# Patient Record
Sex: MALE | Race: WHITE | NOT HISPANIC OR LATINO | Employment: OTHER | URBAN - METROPOLITAN AREA
[De-identification: names, ages, dates, MRNs, and addresses within clinical notes are randomized per-mention and may not be internally consistent; named-entity substitution may affect disease eponyms.]

---

## 2019-09-17 ENCOUNTER — OFFICE VISIT (OUTPATIENT)
Dept: NEUROLOGY | Facility: CLINIC | Age: 69
End: 2019-09-17

## 2019-09-17 VITALS
BODY MASS INDEX: 31.74 KG/M2 | HEIGHT: 67 IN | HEART RATE: 58 BPM | WEIGHT: 202.2 LBS | SYSTOLIC BLOOD PRESSURE: 128 MMHG | DIASTOLIC BLOOD PRESSURE: 75 MMHG

## 2019-09-17 DIAGNOSIS — G47.33 OBSTRUCTIVE SLEEP APNEA: Primary | ICD-10-CM

## 2019-09-17 PROCEDURE — 99203 OFFICE O/P NEW LOW 30 MIN: CPT | Performed by: PSYCHIATRY & NEUROLOGY

## 2019-09-17 RX ORDER — ROSUVASTATIN CALCIUM 10 MG/1
10 TABLET, COATED ORAL DAILY
Refills: 3 | COMMUNITY
Start: 2019-08-02 | End: 2019-11-01 | Stop reason: SDUPTHER

## 2019-09-17 NOTE — PROGRESS NOTES
Sleep Medicine initial Consultation    Giovani Moseley  : 1950  69 y.o. male   Date of Service: 2019  Referring provider: Yvonne Santa MD  Chief Complaint:  Sleep apnea     First diagnosed in  with ahi of 18  Repeat npsg in 2016 with ahi 2.4 and rdi 20    New sleep, patient had sleep study done on 2016  Patient is new to this area moved here from [place]. Patient. He followed with a  neurologists for CPAP compliance. Patient currently on a CPAP machine and uses a FFM and would like to continue [facility] in [place].     SCR:  avg use 7hr 56 min compliance 96%, avg pressure 6.0 ahi 3.1 and leak 38 sec    Mr. Giovani Moseley  is a 69 y.o. right handed  male patient has a H/O Arthritis is here for the evaluation of Snoring and Restless Leg Syndrome.     The patient c/o There is no H/O sleep paralysis, hypnagogic hallucinations or cataplexy..  There is no history of hypnagogic hallucinations, sleep paralysis or cataplexy.    The patient complains of snoring has dry mouth or sore mouth when he wakes up.    The patient complains of leg jerking during sleep.     The patient complains of difficulty staying asleep and frequent awakenings 1-2.    The patient reports history of There is no h/O sleepwalking or bedwetting or nightmares or sleep eating or acting out dreams    Sleep schedule: Bedtime:10:30 , gets out of bed at 5-7, sleep latency: couple of minutes, Gets about 6-7 hours of sleep.    EPWORTH SLEEPINESS SCALE  Sitting and reading  0  WatchingTV  0  Sitting, inactive, in a public place  0  As a passenger in a car for 1 hour w/o a break  0  Lying down to rest in the afternoon  2  Sitting and talking to someone  0  Sitting quietly after a lunch  0  In a car, while stopped for traffic or a light  0  Total 2        .  Past Medical History:   Diagnosis Date   • Benign prostate hyperplasia    • Hyperlipidemia      History reviewed. No pertinent surgical history.  Current Outpatient  Medications on File Prior to Visit   Medication Sig Dispense Refill   • rosuvastatin (CRESTOR) 10 MG tablet Take 10 mg by mouth Daily.  3     No current facility-administered medications on file prior to visit.      No Known Allergies  Family History   Problem Relation Age of Onset   • Pancreatic cancer Mother    • Alzheimer's disease Father      Social History     Socioeconomic History   • Marital status:      Spouse name: Not on file   • Number of children: Not on file   • Years of education: Not on file   • Highest education level: Not on file   Tobacco Use   • Smoking status: Former Smoker   • Smokeless tobacco: Never Used   Substance and Sexual Activity   • Alcohol use: Yes     Comment: socially   • Drug use: No   • Sexual activity: Defer     Review of Systems   Constitutional: Negative for appetite change and fatigue.   HENT: Negative for sinus pressure and sinus pain.    Eyes: Positive for visual disturbance. Negative for pain and itching.   Respiratory: Negative for cough and shortness of breath.    Cardiovascular: Negative for chest pain and palpitations.   Gastrointestinal: Negative for constipation and diarrhea.   Endocrine: Negative for cold intolerance and heat intolerance.   Genitourinary: Positive for frequency. Negative for difficulty urinating.   Musculoskeletal: Negative for back pain and gait problem.   Allergic/Immunologic: Negative for environmental allergies.   Neurological: Negative for dizziness, tremors, seizures, syncope, facial asymmetry, speech difficulty, weakness, light-headedness, numbness and headaches.   Psychiatric/Behavioral: Negative for agitation and confusion.     I reviewed and addressed ROS entered by MA.    Patient examination:  Vitals:    09/17/19 1741   BP: 128/75   Pulse: 58    Body mass index is 31.67 kg/m².     Physical Exam   Constitutional: He is oriented to person, place, and time. He appears well-developed and well-nourished.   HENT:   Nose: Nose normal.    Mouth/Throat: Oropharynx is clear and moist.   Eyes: Conjunctivae and EOM are normal. Pupils are equal, round, and reactive to light.   Cardiovascular: Normal rate, regular rhythm and normal heart sounds.   Pulmonary/Chest: Effort normal and breath sounds normal.   Musculoskeletal: Normal range of motion. He exhibits no edema or deformity.   Neurological: He is alert and oriented to person, place, and time. No cranial nerve deficit.   Psychiatric: He has a normal mood and affect. His behavior is normal. Thought content normal.   Vitals reviewed.      ASSESSMENT AND PLAN:    History of richard,   Pt compliant with CPAP and benefits. Continue current pressure and mask  Will review cpap download when available----------------------------compliance from 8/19 to 9/17 96%-----------------------------    Encounter Diagnosis   Name Primary?   • Obstructive sleep apnea Yes           Return in about 1 year (around 9/17/2020).    This document has been electronically signed by Joseph Seipel, MD  on September 17, 2019 6:09 PM

## 2019-09-20 ENCOUNTER — TELEPHONE (OUTPATIENT)
Dept: NEUROLOGY | Facility: CLINIC | Age: 69
End: 2019-09-20

## 2019-09-20 DIAGNOSIS — G47.33 OBSTRUCTIVE SLEEP APNEA: Primary | ICD-10-CM

## 2019-11-01 ENCOUNTER — OFFICE VISIT (OUTPATIENT)
Dept: FAMILY MEDICINE CLINIC | Facility: CLINIC | Age: 69
End: 2019-11-01

## 2019-11-01 VITALS
HEART RATE: 47 BPM | HEIGHT: 68 IN | OXYGEN SATURATION: 96 % | WEIGHT: 207 LBS | SYSTOLIC BLOOD PRESSURE: 142 MMHG | DIASTOLIC BLOOD PRESSURE: 83 MMHG | TEMPERATURE: 98.1 F | BODY MASS INDEX: 31.37 KG/M2

## 2019-11-01 DIAGNOSIS — E78.5 HYPERLIPIDEMIA, UNSPECIFIED HYPERLIPIDEMIA TYPE: Primary | ICD-10-CM

## 2019-11-01 DIAGNOSIS — R73.03 PREDIABETES: ICD-10-CM

## 2019-11-01 LAB
ALBUMIN SERPL-MCNC: 5.2 G/DL (ref 3.5–5.2)
ALBUMIN/GLOB SERPL: 2.3 G/DL
ALP SERPL-CCNC: 48 U/L (ref 39–117)
ALT SERPL W P-5'-P-CCNC: 30 U/L (ref 1–41)
ANION GAP SERPL CALCULATED.3IONS-SCNC: 11.1 MMOL/L (ref 5–15)
AST SERPL-CCNC: 22 U/L (ref 1–40)
BILIRUB SERPL-MCNC: 1.9 MG/DL (ref 0.2–1.2)
BUN BLD-MCNC: 15 MG/DL (ref 8–23)
BUN/CREAT SERPL: 15.6 (ref 7–25)
CALCIUM SPEC-SCNC: 9.7 MG/DL (ref 8.6–10.5)
CHLORIDE SERPL-SCNC: 102 MMOL/L (ref 98–107)
CHOLEST SERPL-MCNC: 134 MG/DL (ref 0–200)
CO2 SERPL-SCNC: 28.9 MMOL/L (ref 22–29)
CREAT BLD-MCNC: 0.96 MG/DL (ref 0.76–1.27)
GFR SERPL CREATININE-BSD FRML MDRD: 78 ML/MIN/1.73
GLOBULIN UR ELPH-MCNC: 2.3 GM/DL
GLUCOSE BLD-MCNC: 100 MG/DL (ref 65–99)
HBA1C MFR BLD: 5.1 % (ref 3.5–5.6)
HDLC SERPL-MCNC: 64 MG/DL (ref 40–60)
LDLC SERPL CALC-MCNC: 53 MG/DL (ref 0–100)
LDLC/HDLC SERPL: 0.83 {RATIO}
POTASSIUM BLD-SCNC: 4.5 MMOL/L (ref 3.5–5.2)
PROT SERPL-MCNC: 7.5 G/DL (ref 6–8.5)
SODIUM BLD-SCNC: 142 MMOL/L (ref 136–145)
TRIGL SERPL-MCNC: 85 MG/DL (ref 0–150)
VLDLC SERPL-MCNC: 17 MG/DL

## 2019-11-01 PROCEDURE — 36415 COLL VENOUS BLD VENIPUNCTURE: CPT | Performed by: FAMILY MEDICINE

## 2019-11-01 PROCEDURE — 83036 HEMOGLOBIN GLYCOSYLATED A1C: CPT | Performed by: FAMILY MEDICINE

## 2019-11-01 PROCEDURE — 80053 COMPREHEN METABOLIC PANEL: CPT | Performed by: FAMILY MEDICINE

## 2019-11-01 PROCEDURE — 80061 LIPID PANEL: CPT | Performed by: FAMILY MEDICINE

## 2019-11-01 PROCEDURE — 99203 OFFICE O/P NEW LOW 30 MIN: CPT | Performed by: FAMILY MEDICINE

## 2019-11-01 RX ORDER — ZOSTER VACCINE RECOMBINANT, ADJUVANTED 50 MCG/0.5
KIT INTRAMUSCULAR
Refills: 0 | COMMUNITY
Start: 2019-10-18 | End: 2019-11-08

## 2019-11-01 RX ORDER — PREDNISOLN SP/MOXIFLOX/BROMFEN 1 %-0.5 %
DROPS OPHTHALMIC (EYE)
Refills: 1 | COMMUNITY
Start: 2019-10-15 | End: 2019-11-08

## 2019-11-01 RX ORDER — ROSUVASTATIN CALCIUM 10 MG/1
10 TABLET, COATED ORAL DAILY
Qty: 90 TABLET | Refills: 3 | Status: SHIPPED | OUTPATIENT
Start: 2019-11-01 | End: 2021-03-31

## 2019-11-08 ENCOUNTER — OFFICE VISIT (OUTPATIENT)
Dept: CARDIOLOGY | Facility: CLINIC | Age: 69
End: 2019-11-08

## 2019-11-08 VITALS
BODY MASS INDEX: 30.62 KG/M2 | HEIGHT: 68 IN | HEART RATE: 57 BPM | OXYGEN SATURATION: 97 % | DIASTOLIC BLOOD PRESSURE: 80 MMHG | WEIGHT: 202 LBS | SYSTOLIC BLOOD PRESSURE: 126 MMHG | RESPIRATION RATE: 18 BRPM

## 2019-11-08 DIAGNOSIS — E78.2 MIXED HYPERLIPIDEMIA: ICD-10-CM

## 2019-11-08 DIAGNOSIS — R01.1 HEART MURMUR: Primary | ICD-10-CM

## 2019-11-08 PROCEDURE — 99203 OFFICE O/P NEW LOW 30 MIN: CPT | Performed by: INTERNAL MEDICINE

## 2019-11-11 NOTE — PROGRESS NOTES
Subjective:     Encounter Date:11/08/2019      Patient ID: Giovani Moseley is a 69 y.o. male.    Chief Complaint:  Chief Complaint   Patient presents with   • Coronary Artery Disease     Unconfirmed   • Hyperlipidemia       HPI:  Giovani is a very pleasant 69-year-old male patient who is new to me who presents for establishing care.  He is a transplant from [facility].  He has coronary artery risk factor significant only for previous tobacco use and dyslipidemia.  He is on no other medications save rosuvastatin 10 mg p.o. daily.  Reportedly he underwent stress testing 3 years ago which was normal.  He is never undergone cardiac catheterization.  I personally reviewed his electric cardiogram from 1/18/2019 which showed sinus bradycardia and a mildly late transition in the precordial leads.  I personally reviewed his most recent labs showed an LDL of 129 and HDL of 65.    He has no complaints from a cardiovascular standpoint.    The following portions of the patient's history were reviewed and updated as appropriate: allergies, current medications, past family history, past medical history, past social history, past surgical history and problem list.    Problem List:  Patient Active Problem List   Diagnosis   • Hyperlipidemia   • Prediabetes       Past Medical History:  Past Medical History:   Diagnosis Date   • Abnormal ECG 01/18/2019    I believe past submitted records reflect such   • Benign prostate hyperplasia    • Congenital heart 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    S/A   • Coronary artery 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    S/A   • Hyperlipidemia    • Sleep apnea 01/31/08    conflicting opinions from different specialists in 2008 and       Past Surgical History:  Past Surgical History:   Procedure Laterality Date   • EYE SURGERY Bilateral 10/3 and 10/15   • TONSILLECTOMY         Social History:  Social History     Socioeconomic History   • Marital status:      Spouse name: Not on file   • Number of children: Not on file   • Years of  "education: Not on file   • Highest education level: Not on file   Occupational History   • 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   Tobacco Use   • Smoking status: Former Smoker     Packs/day: 0.50     Years: 30.00     Pack years: 15.00     Types: Cigarettes     Start date: 1966     Last attempt to quit: 1995     Years since quittin.8   • Smokeless tobacco: Never Used   • Tobacco comment: Stopped in mid 90s   Substance and Sexual Activity   • Alcohol use: Yes     Types: 2 Cans of beer per week     Frequency: Monthly or less     Comment: light drinker when socializing   • Drug use: No   • Sexual activity: No       Allergies:  No Known Allergies      Review of Symptoms:  Constitutional: Patient afebrile no chills or unexpected weight changes  Respiratory: No cough, no wheezing or dyspnea  Cardiovascular: No chest pain, palpitations, dyspnea, orthopnea and no edema  Gastrointestinal: No nausea, vomiting, constipation or diarrhea.  No melena or dark stools    All other systems reviewed and are negative         Objective:         /80 (BP Location: Left arm, Patient Position: Sitting, Cuff Size: Large Adult)   Pulse 57   Resp 18   Ht 172.7 cm (68\")   Wt 91.6 kg (202 lb)   SpO2 97%   BMI 30.71 kg/m²     Physical exam  Constitutional: well-nourished, and appears stated age in no acute distress  PERRL: Conjunctiva clear, no pallor, anicteric  HENMT: normocephalic, normal dentition, no cyanosis or pallor  Neck:no bruits, or thrills and bilateral normal carotid upstroke. Normal jugular venous pressure  Cardiovascular: No parasternal heaves an non-displaced focal PMI. Normal rate and rhythm: no rub, gallop, 1-2 out of 6 early peaking systolic ejection murmur and normal S1 and S2; no lower or upper extremity edema.   Lungs: unlabored, no wheezing with no rales or rhonchi on auscultation.  Extremities: Warm, no clubbing, cyanosis. Full and equal peripheral pulses in extremities with no bruits " appreciated.   Abdomen: soft, non-tender, non-distended  Musculoskeletal: no joint tenderness or swelling and no erythema  Skin: Warm and dry, non-erythematous   Neuro:alert and normal affect. Oriented to time, place and person.       In-Office Procedure(s):  Procedures    ASCVD RIsk Score::  The 10-year ASCVD risk score (Julia LANCE Jr., et al., 2013) is: 10.8%    Values used to calculate the score:      Age: 69 years      Sex: Male      Is Non- : No      Diabetic: No      Tobacco smoker: No      Systolic Blood Pressure: 126 mmHg      Is BP treated: No      HDL Cholesterol: 64 mg/dL      Total Cholesterol: 134 mg/dL    Recent Radiology:  Imaging Results (Most Recent)     None          Lab Review:   Office Visit on 11/01/2019   Component Date Value   • Glucose 11/01/2019 100*   • BUN 11/01/2019 15    • Creatinine 11/01/2019 0.96    • Sodium 11/01/2019 142    • Potassium 11/01/2019 4.5    • Chloride 11/01/2019 102    • CO2 11/01/2019 28.9    • Calcium 11/01/2019 9.7    • Total Protein 11/01/2019 7.5    • Albumin 11/01/2019 5.20    • ALT (SGPT) 11/01/2019 30    • AST (SGOT) 11/01/2019 22    • Alkaline Phosphatase 11/01/2019 48    • Total Bilirubin 11/01/2019 1.9*   • eGFR Non African Amer 11/01/2019 78    • Globulin 11/01/2019 2.3    • A/G Ratio 11/01/2019 2.3    • BUN/Creatinine Ratio 11/01/2019 15.6    • Anion Gap 11/01/2019 11.1    • Total Cholesterol 11/01/2019 134    • Triglycerides 11/01/2019 85    • HDL Cholesterol 11/01/2019 64*   • LDL Cholesterol  11/01/2019 53    • VLDL Cholesterol 11/01/2019 17    • LDL/HDL Ratio 11/01/2019 0.83    • Hemoglobin A1C 11/01/2019 5.1             Invalid input(s): ALKPO4                        Invalid input(s): LDLCALC                Assessment:          Diagnosis Plan   1. Heart murmur  Adult Transthoracic Echo Complete W/ Cont if Necessary Per Protocol   2. Mixed hyperlipidemia            Plan:         1. Heart murmur  Likely aortic sclerosis.  - Adult  Transthoracic Echo Complete W/ Cont if Necessary Per Protocol; Future    2. Mixed hyperlipidemia  Well-controlled on medical therapy.    Greater than 30 minutes of face-to-face time was spent with the patient and family, of which greater than 50% was spent counseling specifically discussing dietary restriction, exercise regimen and overall primary prevention for the development of the coronary artery disease.        Level of Care:                 Hollis Redd MD  11/11/19  .

## 2019-11-13 ENCOUNTER — HOSPITAL ENCOUNTER (OUTPATIENT)
Dept: CARDIOLOGY | Facility: HOSPITAL | Age: 69
Discharge: HOME | End: 2019-11-13
Admitting: INTERNAL MEDICINE

## 2019-11-13 VITALS
DIASTOLIC BLOOD PRESSURE: 67 MMHG | WEIGHT: 194 LBS | BODY MASS INDEX: 30.45 KG/M2 | HEART RATE: 54 BPM | SYSTOLIC BLOOD PRESSURE: 126 MMHG | HEIGHT: 67 IN

## 2019-11-13 DIAGNOSIS — R01.1 HEART MURMUR: ICD-10-CM

## 2019-11-13 PROCEDURE — 93306 TTE W/DOPPLER COMPLETE: CPT | Performed by: INTERNAL MEDICINE

## 2019-11-13 PROCEDURE — 93306 TTE W/DOPPLER COMPLETE: CPT

## 2019-11-15 LAB
BH CV ECHO MEAS - ACS: 2.2 CM
BH CV ECHO MEAS - AO MAX PG (FULL): 1.6 MMHG
BH CV ECHO MEAS - AO MAX PG: 4.2 MMHG
BH CV ECHO MEAS - AO MEAN PG (FULL): 1.3 MMHG
BH CV ECHO MEAS - AO MEAN PG: 2.5 MMHG
BH CV ECHO MEAS - AO ROOT AREA (BSA CORRECTED): 1.4
BH CV ECHO MEAS - AO ROOT AREA: 6.5 CM^2
BH CV ECHO MEAS - AO ROOT DIAM: 2.9 CM
BH CV ECHO MEAS - AO V2 MAX: 102.6 CM/SEC
BH CV ECHO MEAS - AO V2 MEAN: 76.6 CM/SEC
BH CV ECHO MEAS - AO V2 VTI: 24.2 CM
BH CV ECHO MEAS - AORTIC HR: 49.6 BPM
BH CV ECHO MEAS - AORTIC R-R: 1.2 SEC
BH CV ECHO MEAS - ASC AORTA: 3.1 CM
BH CV ECHO MEAS - AVA(I,A): 3.3 CM^2
BH CV ECHO MEAS - AVA(I,D): 3.3 CM^2
BH CV ECHO MEAS - AVA(V,A): 3.5 CM^2
BH CV ECHO MEAS - AVA(V,D): 3.5 CM^2
BH CV ECHO MEAS - BSA(HAYCOCK): 2.1 M^2
BH CV ECHO MEAS - BSA: 2 M^2
BH CV ECHO MEAS - BZI_BMI: 30.4 KILOGRAMS/M^2
BH CV ECHO MEAS - BZI_METRIC_HEIGHT: 170.2 CM
BH CV ECHO MEAS - BZI_METRIC_WEIGHT: 88 KG
BH CV ECHO MEAS - CI(AO): 3.9 L/MIN/M^2
BH CV ECHO MEAS - CI(LVOT): 2 L/MIN/M^2
BH CV ECHO MEAS - CO(AO): 7.8 L/MIN
BH CV ECHO MEAS - CO(LVOT): 3.9 L/MIN
BH CV ECHO MEAS - EDV(CUBED): 99.7 ML
BH CV ECHO MEAS - EDV(MOD-SP2): 98.9 ML
BH CV ECHO MEAS - EDV(MOD-SP4): 92.3 ML
BH CV ECHO MEAS - EDV(TEICH): 99.2 ML
BH CV ECHO MEAS - EF(CUBED): 60.3 %
BH CV ECHO MEAS - EF(MOD-BP): 69 %
BH CV ECHO MEAS - EF(MOD-SP2): 72.8 %
BH CV ECHO MEAS - EF(MOD-SP4): 66.2 %
BH CV ECHO MEAS - EF(TEICH): 51.9 %
BH CV ECHO MEAS - ESV(CUBED): 39.6 ML
BH CV ECHO MEAS - ESV(MOD-SP2): 26.9 ML
BH CV ECHO MEAS - ESV(MOD-SP4): 31.2 ML
BH CV ECHO MEAS - ESV(TEICH): 47.7 ML
BH CV ECHO MEAS - FS: 26.5 %
BH CV ECHO MEAS - IVS/LVPW: 1.1
BH CV ECHO MEAS - IVSD: 1.3 CM
BH CV ECHO MEAS - LA DIMENSION(2D): 3.7 CM
BH CV ECHO MEAS - LV DIASTOLIC VOL/BSA (35-75): 46.2 ML/M^2
BH CV ECHO MEAS - LV MASS(C)D: 215.1 GRAMS
BH CV ECHO MEAS - LV MASS(C)DI: 107.8 GRAMS/M^2
BH CV ECHO MEAS - LV MAX PG: 2.6 MMHG
BH CV ECHO MEAS - LV MEAN PG: 1.2 MMHG
BH CV ECHO MEAS - LV SYSTOLIC VOL/BSA (12-30): 15.6 ML/M^2
BH CV ECHO MEAS - LV V1 MAX: 80.9 CM/SEC
BH CV ECHO MEAS - LV V1 MEAN: 50.7 CM/SEC
BH CV ECHO MEAS - LV V1 VTI: 18 CM
BH CV ECHO MEAS - LVIDD: 4.6 CM
BH CV ECHO MEAS - LVIDS: 3.4 CM
BH CV ECHO MEAS - LVOT AREA: 4.4 CM^2
BH CV ECHO MEAS - LVOT DIAM: 2.4 CM
BH CV ECHO MEAS - LVPWD: 1.2 CM
BH CV ECHO MEAS - MV A MAX VEL: 62 CM/SEC
BH CV ECHO MEAS - MV DEC SLOPE: 207.6 CM/SEC^2
BH CV ECHO MEAS - MV DEC TIME: 0.31 SEC
BH CV ECHO MEAS - MV E MAX VEL: 65.1 CM/SEC
BH CV ECHO MEAS - MV E/A: 1.1
BH CV ECHO MEAS - PA ACC TIME: 0.16 SEC
BH CV ECHO MEAS - PA MAX PG (FULL): 9.4 MMHG
BH CV ECHO MEAS - PA MAX PG: 10.6 MMHG
BH CV ECHO MEAS - PA MEAN PG (FULL): 4.8 MMHG
BH CV ECHO MEAS - PA MEAN PG: 5.4 MMHG
BH CV ECHO MEAS - PA PR(ACCEL): 7.1 MMHG
BH CV ECHO MEAS - PA V2 MAX: 162.7 CM/SEC
BH CV ECHO MEAS - PA V2 MEAN: 108.4 CM/SEC
BH CV ECHO MEAS - PA V2 VTI: 36.7 CM
BH CV ECHO MEAS - PVA(I,A): 1.3 CM^2
BH CV ECHO MEAS - PVA(I,D): 1.3 CM^2
BH CV ECHO MEAS - PVA(V,A): 1.3 CM^2
BH CV ECHO MEAS - PVA(V,D): 1.3 CM^2
BH CV ECHO MEAS - QP/QS: 0.61
BH CV ECHO MEAS - RAP SYSTOLE: 3 MMHG
BH CV ECHO MEAS - RV MAX PG: 1.2 MMHG
BH CV ECHO MEAS - RV MEAN PG: 0.62 MMHG
BH CV ECHO MEAS - RV V1 MAX: 53.8 CM/SEC
BH CV ECHO MEAS - RV V1 MEAN: 36.7 CM/SEC
BH CV ECHO MEAS - RV V1 VTI: 12 CM
BH CV ECHO MEAS - RVDD: 3.8 CM
BH CV ECHO MEAS - RVOT AREA: 4 CM^2
BH CV ECHO MEAS - RVOT DIAM: 2.3 CM
BH CV ECHO MEAS - RVSP: 24.4 MMHG
BH CV ECHO MEAS - SI(AO): 78.4 ML/M^2
BH CV ECHO MEAS - SI(CUBED): 30.1 ML/M^2
BH CV ECHO MEAS - SI(LVOT): 39.8 ML/M^2
BH CV ECHO MEAS - SI(MOD-SP2): 36.1 ML/M^2
BH CV ECHO MEAS - SI(MOD-SP4): 30.6 ML/M^2
BH CV ECHO MEAS - SI(TEICH): 25.8 ML/M^2
BH CV ECHO MEAS - SV(AO): 156.5 ML
BH CV ECHO MEAS - SV(CUBED): 60.1 ML
BH CV ECHO MEAS - SV(LVOT): 79.4 ML
BH CV ECHO MEAS - SV(MOD-SP2): 72 ML
BH CV ECHO MEAS - SV(MOD-SP4): 61.1 ML
BH CV ECHO MEAS - SV(RVOT): 48.6 ML
BH CV ECHO MEAS - SV(TEICH): 51.4 ML
BH CV ECHO MEAS - TR MAX VEL: 231.2 CM/SEC
MAXIMAL PREDICTED HEART RATE: 152 BPM
STRESS TARGET HR: 129 BPM

## 2019-11-20 ENCOUNTER — TELEPHONE (OUTPATIENT)
Dept: CARDIOLOGY | Facility: CLINIC | Age: 69
End: 2019-11-20

## 2019-11-20 NOTE — TELEPHONE ENCOUNTER
LMOM for pt to return my call for echo results. Per Ivania, no sign of heart failure and only mild leakiness from a couple of your heart valves which is normal for your age. Maria De Jesus

## 2020-08-03 ENCOUNTER — LAB (OUTPATIENT)
Dept: FAMILY MEDICINE CLINIC | Facility: CLINIC | Age: 70
End: 2020-08-03

## 2020-08-03 ENCOUNTER — OFFICE VISIT (OUTPATIENT)
Dept: FAMILY MEDICINE CLINIC | Facility: CLINIC | Age: 70
End: 2020-08-03

## 2020-08-03 VITALS
DIASTOLIC BLOOD PRESSURE: 73 MMHG | HEART RATE: 49 BPM | WEIGHT: 196 LBS | TEMPERATURE: 97.7 F | BODY MASS INDEX: 30.7 KG/M2 | OXYGEN SATURATION: 95 % | SYSTOLIC BLOOD PRESSURE: 144 MMHG

## 2020-08-03 DIAGNOSIS — E78.2 MIXED HYPERLIPIDEMIA: ICD-10-CM

## 2020-08-03 DIAGNOSIS — R73.03 PREDIABETES: ICD-10-CM

## 2020-08-03 DIAGNOSIS — Z00.00 WELLNESS EXAMINATION: Primary | ICD-10-CM

## 2020-08-03 LAB
ALBUMIN SERPL-MCNC: 4.5 G/DL (ref 3.5–5.2)
ALBUMIN/GLOB SERPL: 2 G/DL
ALP SERPL-CCNC: 41 U/L (ref 39–117)
ALT SERPL W P-5'-P-CCNC: 36 U/L (ref 1–41)
ANION GAP SERPL CALCULATED.3IONS-SCNC: 9.8 MMOL/L (ref 5–15)
AST SERPL-CCNC: 20 U/L (ref 1–40)
BILIRUB SERPL-MCNC: 2 MG/DL (ref 0–1.2)
BUN SERPL-MCNC: 13 MG/DL (ref 8–23)
BUN/CREAT SERPL: 14.4 (ref 7–25)
CALCIUM SPEC-SCNC: 9.5 MG/DL (ref 8.6–10.5)
CHLORIDE SERPL-SCNC: 102 MMOL/L (ref 98–107)
CHOLEST SERPL-MCNC: 129 MG/DL (ref 0–200)
CO2 SERPL-SCNC: 26.2 MMOL/L (ref 22–29)
CREAT SERPL-MCNC: 0.9 MG/DL (ref 0.76–1.27)
GFR SERPL CREATININE-BSD FRML MDRD: 84 ML/MIN/1.73
GLOBULIN UR ELPH-MCNC: 2.2 GM/DL
GLUCOSE SERPL-MCNC: 98 MG/DL (ref 65–99)
HBA1C MFR BLD: 5.3 % (ref 3.5–5.6)
HDLC SERPL-MCNC: 57 MG/DL (ref 40–60)
LDLC SERPL CALC-MCNC: 57 MG/DL (ref 0–100)
LDLC/HDLC SERPL: 1.01 {RATIO}
POTASSIUM SERPL-SCNC: 4.1 MMOL/L (ref 3.5–5.2)
PROT SERPL-MCNC: 6.7 G/DL (ref 6–8.5)
SODIUM SERPL-SCNC: 138 MMOL/L (ref 136–145)
TRIGL SERPL-MCNC: 73 MG/DL (ref 0–150)
VLDLC SERPL-MCNC: 14.6 MG/DL (ref 5–40)

## 2020-08-03 PROCEDURE — G0439 PPPS, SUBSEQ VISIT: HCPCS | Performed by: FAMILY MEDICINE

## 2020-08-03 PROCEDURE — 36415 COLL VENOUS BLD VENIPUNCTURE: CPT | Performed by: FAMILY MEDICINE

## 2020-08-03 PROCEDURE — 80053 COMPREHEN METABOLIC PANEL: CPT | Performed by: FAMILY MEDICINE

## 2020-08-03 PROCEDURE — 80061 LIPID PANEL: CPT | Performed by: FAMILY MEDICINE

## 2020-08-03 PROCEDURE — 83036 HEMOGLOBIN GLYCOSYLATED A1C: CPT | Performed by: FAMILY MEDICINE

## 2020-08-03 NOTE — PROGRESS NOTES
Subjective   Chief Complaint   Patient presents with   • Hyperlipidemia     f/u   • Labs Only     Giovani Moseley is a 70 y.o. male.     Hyperlipidemia   This is a chronic problem. The current episode started more than 1 year ago. The problem is controlled. Recent lipid tests were reviewed and are normal. He has no history of chronic renal disease.      Past Medical History:   Diagnosis Date   • Abnormal ECG 2019    I believe past submitted records reflect such   • Benign prostate hyperplasia    • Congenital heart disease 2015    S/A   • Coronary artery disease 2015    S/A   • Hyperlipidemia    • Sleep apnea 08    conflicting opinions from different specialists in 2008 and     Past Surgical History:   Procedure Laterality Date   • EYE SURGERY Bilateral 10/3 and 10/15   • TONSILLECTOMY       No Known Allergies  Social History     Socioeconomic History   • Marital status:      Spouse name: Not on file   • Number of children: Not on file   • Years of education: Not on file   • Highest education level: Not on file   Occupational History   • 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   Tobacco Use   • Smoking status: Former Smoker     Packs/day: 0.50     Years: 30.00     Pack years: 15.00     Types: Cigarettes     Start date: 1966     Last attempt to quit: 1995     Years since quittin.6   • Smokeless tobacco: Never Used   • Tobacco comment: Stopped in mid s   Substance and Sexual Activity   • Alcohol use: Yes     Types: 2 Cans of beer per week     Frequency: Monthly or less     Comment: light drinker when socializing   • Drug use: No   • Sexual activity: Never     Social History     Tobacco Use   Smoking Status Former Smoker   • Packs/day: 0.50   • Years: 30.00   • Pack years: 15.00   • Types: Cigarettes   • Start date: 1966   • Last attempt to quit: 1995   • Years since quittin.6   Smokeless Tobacco Never Used   Tobacco Comment    Stopped in mid 90s       family  history includes Alzheimer's disease in his father; Pancreatic cancer in his mother.  Current Outpatient Medications on File Prior to Visit   Medication Sig Dispense Refill   • rosuvastatin (CRESTOR) 10 MG tablet Take 1 tablet by mouth Daily. 90 tablet 3     No current facility-administered medications on file prior to visit.      Patient Active Problem List   Diagnosis   • Hyperlipidemia   • Prediabetes       The following portions of the patient's history were reviewed and updated as appropriate: allergies, current medications, past family history, past medical history, past social history, past surgical history and problem list.    Review of Systems    Objective   /73 (BP Location: Left arm, Patient Position: Sitting, Cuff Size: Adult)   Pulse (!) 49   Temp 97.7 °F (36.5 °C)   Wt 88.9 kg (196 lb)   SpO2 95%   BMI 30.70 kg/m²   Physical Exam    No visits with results within 1 Week(s) from this visit.   Latest known visit with results is:   Hospital Outpatient Visit on 11/13/2019   Component Date Value Ref Range Status   • BSA 11/13/2019 2.0  m^2 Final   • RVIDd 11/13/2019 3.8  cm Final   • IVSd 11/13/2019 1.3  cm Final   • LVIDd 11/13/2019 4.6  cm Final   • LVIDs 11/13/2019 3.4  cm Final   • LVPWd 11/13/2019 1.2  cm Final   • IVS/LVPW 11/13/2019 1.1   Final   • FS 11/13/2019 26.5  % Final   • EDV(Teich) 11/13/2019 99.2  ml Final   • ESV(Teich) 11/13/2019 47.7  ml Final   • EF(Teich) 11/13/2019 51.9  % Final   • EDV(cubed) 11/13/2019 99.7  ml Final   • ESV(cubed) 11/13/2019 39.6  ml Final   • EF(cubed) 11/13/2019 60.3  % Final   • LV mass(C)d 11/13/2019 215.1  grams Final   • LV mass(C)dI 11/13/2019 107.8  grams/m^2 Final   • SV(Teich) 11/13/2019 51.4  ml Final   • SI(Teich) 11/13/2019 25.8  ml/m^2 Final   • SV(cubed) 11/13/2019 60.1  ml Final   • SI(cubed) 11/13/2019 30.1  ml/m^2 Final   • Ao root diam 11/13/2019 2.9  cm Final   • Ao root area 11/13/2019 6.5  cm^2 Final   • ACS 11/13/2019 2.2  cm Final    • asc Aorta Diam 11/13/2019 3.1  cm Final   • LVOT diam 11/13/2019 2.4  cm Final   • LVOT area 11/13/2019 4.4  cm^2 Final   • RVOT diam 11/13/2019 2.3  cm Final   • RVOT area 11/13/2019 4.0  cm^2 Final   • EDV(MOD-sp4) 11/13/2019 92.3  ml Final   • ESV(MOD-sp4) 11/13/2019 31.2  ml Final   • EF(MOD-sp4) 11/13/2019 66.2  % Final   • EDV(MOD-sp2) 11/13/2019 98.9  ml Final   • ESV(MOD-sp2) 11/13/2019 26.9  ml Final   • EF(MOD-sp2) 11/13/2019 72.8  % Final   • SV(MOD-sp4) 11/13/2019 61.1  ml Final   • SI(MOD-sp4) 11/13/2019 30.6  ml/m^2 Final   • SV(MOD-sp2) 11/13/2019 72.0  ml Final   • SI(MOD-sp2) 11/13/2019 36.1  ml/m^2 Final   • Ao root area (BSA corrected) 11/13/2019 1.4   Final   • LV Montejo Vol (BSA corrected) 11/13/2019 46.2  ml/m^2 Final   • LV Sys Vol (BSA corrected) 11/13/2019 15.6  ml/m^2 Final   • Aortic R-R 11/13/2019 1.2  sec Final   • Aortic HR 11/13/2019 49.6  BPM Final   • MV E max lisa 11/13/2019 65.1  cm/sec Final   • MV A max lisa 11/13/2019 62.0  cm/sec Final   • MV E/A 11/13/2019 1.1   Final   • MV dec slope 11/13/2019 207.6  cm/sec^2 Final   • MV dec time 11/13/2019 0.31  sec Final   • Ao pk lisa 11/13/2019 102.6  cm/sec Final   • Ao max PG 11/13/2019 4.2  mmHg Final   • Ao max PG (full) 11/13/2019 1.6  mmHg Final   • Ao V2 mean 11/13/2019 76.6  cm/sec Final   • Ao mean PG 11/13/2019 2.5  mmHg Final   • Ao mean PG (full) 11/13/2019 1.3  mmHg Final   • Ao V2 VTI 11/13/2019 24.2  cm Final   • FRANCISCO(I,A) 11/13/2019 3.3  cm^2 Final   • FRANCISCO(I,D) 11/13/2019 3.3  cm^2 Final   • FRANCISCO(V,A) 11/13/2019 3.5  cm^2 Final   • FRANCISCO(V,D) 11/13/2019 3.5  cm^2 Final   • LV V1 max PG 11/13/2019 2.6  mmHg Final   • LV V1 mean PG 11/13/2019 1.2  mmHg Final   • LV V1 max 11/13/2019 80.9  cm/sec Final   • LV V1 mean 11/13/2019 50.7  cm/sec Final   • LV V1 VTI 11/13/2019 18.0  cm Final   • CO(Ao) 11/13/2019 7.8  l/min Final   • CI(Ao) 11/13/2019 3.9  l/min/m^2 Final   • SV(Ao) 11/13/2019 156.5  ml Final   • SI(Ao) 11/13/2019  78.4  ml/m^2 Final   • CO(LVOT) 11/13/2019 3.9  l/min Final   • CI(LVOT) 11/13/2019 2.0  l/min/m^2 Final   • SV(LVOT) 11/13/2019 79.4  ml Final   • SV(RVOT) 11/13/2019 48.6  ml Final   • SI(LVOT) 11/13/2019 39.8  ml/m^2 Final   • PA V2 max 11/13/2019 162.7  cm/sec Final   • PA max PG 11/13/2019 10.6  mmHg Final   • PA max PG (full) 11/13/2019 9.4  mmHg Final   • PA V2 mean 11/13/2019 108.4  cm/sec Final   • PA mean PG 11/13/2019 5.4  mmHg Final   • PA mean PG (full) 11/13/2019 4.8  mmHg Final   • PA V2 VTI 11/13/2019 36.7  cm Final   • PVA(I,A) 11/13/2019 1.3  cm^2 Final   • BH CV ECHO AYANNA - PVA(I,D) 11/13/2019 1.3  cm^2 Final   • BH CV ECHO AYANNA - PVA(V,A) 11/13/2019 1.3  cm^2 Final   • BH CV ECHO AYANNA - PVA(V,D) 11/13/2019 1.3  cm^2 Final   • PA acc time 11/13/2019 0.16  sec Final   • RV V1 max PG 11/13/2019 1.2  mmHg Final   • RV V1 mean PG 11/13/2019 0.62  mmHg Final   • RV V1 max 11/13/2019 53.8  cm/sec Final   • RV V1 mean 11/13/2019 36.7  cm/sec Final   • RV V1 VTI 11/13/2019 12.0  cm Final   • TR max lisa 11/13/2019 231.2  cm/sec Final   • RVSP(TR) 11/13/2019 24.4  mmHg Final   • RAP systole 11/13/2019 3.0  mmHg Final   • PA pr(Accel) 11/13/2019 7.1  mmHg Final   • Qp/Qs 11/13/2019 0.61   Final   • BH CV ECHO AYANNA - BZI_BMI 11/13/2019 30.4  kilograms/m^2 Final   • BH CV ECHO AYANNA - BSA(HAYCOCK) 11/13/2019 2.1  m^2 Final   • BH CV ECHO AYANNA - BZI_METRIC_WEIGHT 11/13/2019 88.0  kg Final   • BH CV ECHO AYANNA - BZI_METRIC_HEIGHT 11/13/2019 170.2  cm Final   • EF(MOD-bp) 11/13/2019 69.0  % Final   • LA dimension(2D) 11/13/2019 3.7  cm Final   • Target HR (85%) 11/13/2019 129  bpm Final   • Max. Pred. HR (100%) 11/13/2019 152  bpm Final           Assessment/Plan              Answers for HPI/ROS submitted by the patient on 8/2/2020   What is the primary reason for your visit?: Other  Please describe your symptoms.: Bi-annual 6 month  visit with blood testing which has been delayed by Covaid 19., Renewal of  Rosuvastin 10mg prescription by Dr. Santa if it is nearing expiration., Otherwise, no issues with me.  Have you had these symptoms before?: Yes  How long have you been having these symptoms?: 1-4 days  Please list any medications you are currently taking for this condition.: N/A  Please describe any probable cause for these symptoms. : N/A

## 2020-08-03 NOTE — PROGRESS NOTES
Medicare Wellness Visit   The ABC's of the Annual Wellness Visit    Chief Complaint   Patient presents with   • Hyperlipidemia     f/u   • Labs Only       HPI:  GIULIA Pal-1950, is a 70 y.o. male who presents for a Medicare Wellness Visit.    Recent Hospitalizations:  No hospitalization(s) within the last year..    Current Medical Providers:  Patient Care Team:  Yvonne Santa MD as PCP - General (Family Medicine)  Yvonne Santa MD as PCP - Claims Attributed  Patrick Crane MD as Consulting Physician (Urology)  Seipel, Joseph F, MD as Consulting Physician (Sleep Medicine)  Hollis Redd MD as Consulting Physician (Cardiology)  Alexey Stuart MD as Consulting Physician (Ophthalmology)    Health Habits and Functional and Cognitive Screening and Depression Screening:  Functional & Cognitive Status 8/3/2020   Do you have difficulty preparing food and eating? No   Do you have difficulty bathing yourself, getting dressed or grooming yourself? No   Do you have difficulty using the toilet? No   Do you have difficulty moving around from place to place? No   Do you have trouble with steps or getting out of a bed or a chair? No   Current Diet Well Balanced Diet   Current Exercise Activities Include Aerobics   Do you need help using the phone?  No   Are you deaf or do you have serious difficulty hearing?  No   Do you need help with transportation? No   Do you need help shopping? No   Do you need help preparing meals?  No   Do you need help with housework?  No   Do you need help with laundry? No   Do you need help taking your medications? No   Do you ever drive or ride in a car without wearing a seat belt? No   Have you felt unusual stress, anger or loneliness in the last month? No   Who do you live with? Spouse   If you need help, do you have trouble finding someone available to you? No   Do you have difficulty concentrating, remembering or making decisions? No       Compared to one  year ago, the patient feels his physical health is the same and his mental health is the same.    Depression Screen:  PHQ-2/PHQ-9 Depression Screening 8/3/2020   Little interest or pleasure in doing things 0   Feeling down, depressed, or hopeless 0   Total Score 0         Past Medical/Family/Social History:  The following portions of the patient's history were reviewed and updated as appropriate: allergies, current medications, past family history, past medical history, past social history, past surgical history and problem list.    No Known Allergies      Current Outpatient Medications:   •  rosuvastatin (CRESTOR) 10 MG tablet, Take 1 tablet by mouth Daily., Disp: 90 tablet, Rfl: 3    Aspirin use counseling: Does not need ASA (and currently is not on it)    Current medication list contains no high risk medications.  No harmful drug interactions have been identified.     Family History   Problem Relation Age of Onset   • Pancreatic cancer Mother    • Alzheimer's disease Father        Social History     Tobacco Use   • Smoking status: Former Smoker     Packs/day: 0.50     Years: 30.00     Pack years: 15.00     Types: Cigarettes     Start date: 1966     Last attempt to quit: 1995     Years since quittin.6   • Smokeless tobacco: Never Used   • Tobacco comment: Stopped in mid    Substance Use Topics   • Alcohol use: Yes     Types: 2 Cans of beer per week     Frequency: Monthly or less     Comment: light drinker when socializing       Past Surgical History:   Procedure Laterality Date   • EYE SURGERY Bilateral 10/3 and 10/15   • TONSILLECTOMY         Patient Active Problem List   Diagnosis   • Hyperlipidemia   • Prediabetes       Review of Systems   Constitutional: Negative for chills and fever.   HENT: Negative for sinus pressure and sore throat.    Eyes: Negative for blurred vision.   Respiratory: Negative for cough and shortness of breath.    Cardiovascular: Negative for chest pain and palpitations.    Gastrointestinal: Negative for abdominal pain.   Endocrine: Negative for polyuria.   Skin: Negative for rash.   Neurological: Negative for dizziness and headache.   Hematological: Negative for adenopathy.   Psychiatric/Behavioral: Negative for depressed mood.       Objective     Vitals:    08/03/20 0758   BP: 144/73   BP Location: Left arm   Patient Position: Sitting   Cuff Size: Adult   Pulse: (!) 49   Temp: 97.7 °F (36.5 °C)   SpO2: 95%   Weight: 88.9 kg (196 lb)       Patient's Body mass index is 30.7 kg/m². BMI is above normal parameters. Recommendations include: exercise counseling.      No exam data present    The patient has no evidence of cognitve impairment.     Physical Exam   Constitutional: He is oriented to person, place, and time. He appears well-developed. No distress.   HENT:   Head: Normocephalic.   Eyes: Conjunctivae and lids are normal.   Neck: Trachea normal. No thyroid mass and no thyromegaly present.   Cardiovascular: Normal rate, regular rhythm and normal heart sounds.   Pulmonary/Chest: Effort normal and breath sounds normal.   Lymphadenopathy:     He has no cervical adenopathy.   Neurological: He is alert and oriented to person, place, and time.   Skin: Skin is warm and dry.   Psychiatric: He has a normal mood and affect. His speech is normal and behavior is normal. He is attentive.       Recent Lab Results:     Lab Results   Component Value Date    CHOL 134 11/01/2019    TRIG 85 11/01/2019    HDL 64 (H) 11/01/2019    VLDL 17 11/01/2019    LDLHDL 0.83 11/01/2019     No visits with results within 1 Week(s) from this visit.   Latest known visit with results is:   Hospital Outpatient Visit on 11/13/2019   Component Date Value Ref Range Status   • BSA 11/13/2019 2.0  m^2 Final   • RVIDd 11/13/2019 3.8  cm Final   • IVSd 11/13/2019 1.3  cm Final   • LVIDd 11/13/2019 4.6  cm Final   • LVIDs 11/13/2019 3.4  cm Final   • LVPWd 11/13/2019 1.2  cm Final   • IVS/LVPW 11/13/2019 1.1   Final   • FS  11/13/2019 26.5  % Final   • EDV(Teich) 11/13/2019 99.2  ml Final   • ESV(Teich) 11/13/2019 47.7  ml Final   • EF(Teich) 11/13/2019 51.9  % Final   • EDV(cubed) 11/13/2019 99.7  ml Final   • ESV(cubed) 11/13/2019 39.6  ml Final   • EF(cubed) 11/13/2019 60.3  % Final   • LV mass(C)d 11/13/2019 215.1  grams Final   • LV mass(C)dI 11/13/2019 107.8  grams/m^2 Final   • SV(Teich) 11/13/2019 51.4  ml Final   • SI(Teich) 11/13/2019 25.8  ml/m^2 Final   • SV(cubed) 11/13/2019 60.1  ml Final   • SI(cubed) 11/13/2019 30.1  ml/m^2 Final   • Ao root diam 11/13/2019 2.9  cm Final   • Ao root area 11/13/2019 6.5  cm^2 Final   • ACS 11/13/2019 2.2  cm Final   • asc Aorta Diam 11/13/2019 3.1  cm Final   • LVOT diam 11/13/2019 2.4  cm Final   • LVOT area 11/13/2019 4.4  cm^2 Final   • RVOT diam 11/13/2019 2.3  cm Final   • RVOT area 11/13/2019 4.0  cm^2 Final   • EDV(MOD-sp4) 11/13/2019 92.3  ml Final   • ESV(MOD-sp4) 11/13/2019 31.2  ml Final   • EF(MOD-sp4) 11/13/2019 66.2  % Final   • EDV(MOD-sp2) 11/13/2019 98.9  ml Final   • ESV(MOD-sp2) 11/13/2019 26.9  ml Final   • EF(MOD-sp2) 11/13/2019 72.8  % Final   • SV(MOD-sp4) 11/13/2019 61.1  ml Final   • SI(MOD-sp4) 11/13/2019 30.6  ml/m^2 Final   • SV(MOD-sp2) 11/13/2019 72.0  ml Final   • SI(MOD-sp2) 11/13/2019 36.1  ml/m^2 Final   • Ao root area (BSA corrected) 11/13/2019 1.4   Final   • LV Montejo Vol (BSA corrected) 11/13/2019 46.2  ml/m^2 Final   • LV Sys Vol (BSA corrected) 11/13/2019 15.6  ml/m^2 Final   • Aortic R-R 11/13/2019 1.2  sec Final   • Aortic HR 11/13/2019 49.6  BPM Final   • MV E max lisa 11/13/2019 65.1  cm/sec Final   • MV A max lisa 11/13/2019 62.0  cm/sec Final   • MV E/A 11/13/2019 1.1   Final   • MV dec slope 11/13/2019 207.6  cm/sec^2 Final   • MV dec time 11/13/2019 0.31  sec Final   • Ao pk lisa 11/13/2019 102.6  cm/sec Final   • Ao max PG 11/13/2019 4.2  mmHg Final   • Ao max PG (full) 11/13/2019 1.6  mmHg Final   • Ao V2 mean 11/13/2019 76.6  cm/sec Final   •  Ao mean PG 11/13/2019 2.5  mmHg Final   • Ao mean PG (full) 11/13/2019 1.3  mmHg Final   • Ao V2 VTI 11/13/2019 24.2  cm Final   • FRANCISCO(I,A) 11/13/2019 3.3  cm^2 Final   • FRANCISCO(I,D) 11/13/2019 3.3  cm^2 Final   • FRANCISCO(V,A) 11/13/2019 3.5  cm^2 Final   • FRANCISCO(V,D) 11/13/2019 3.5  cm^2 Final   • LV V1 max PG 11/13/2019 2.6  mmHg Final   • LV V1 mean PG 11/13/2019 1.2  mmHg Final   • LV V1 max 11/13/2019 80.9  cm/sec Final   • LV V1 mean 11/13/2019 50.7  cm/sec Final   • LV V1 VTI 11/13/2019 18.0  cm Final   • CO(Ao) 11/13/2019 7.8  l/min Final   • CI(Ao) 11/13/2019 3.9  l/min/m^2 Final   • SV(Ao) 11/13/2019 156.5  ml Final   • SI(Ao) 11/13/2019 78.4  ml/m^2 Final   • CO(LVOT) 11/13/2019 3.9  l/min Final   • CI(LVOT) 11/13/2019 2.0  l/min/m^2 Final   • SV(LVOT) 11/13/2019 79.4  ml Final   • SV(RVOT) 11/13/2019 48.6  ml Final   • SI(LVOT) 11/13/2019 39.8  ml/m^2 Final   • PA V2 max 11/13/2019 162.7  cm/sec Final   • PA max PG 11/13/2019 10.6  mmHg Final   • PA max PG (full) 11/13/2019 9.4  mmHg Final   • PA V2 mean 11/13/2019 108.4  cm/sec Final   • PA mean PG 11/13/2019 5.4  mmHg Final   • PA mean PG (full) 11/13/2019 4.8  mmHg Final   • PA V2 VTI 11/13/2019 36.7  cm Final   • PVA(I,A) 11/13/2019 1.3  cm^2 Final   • BH CV ECHO AYANNA - PVA(I,D) 11/13/2019 1.3  cm^2 Final   • BH CV ECHO AYANNA - PVA(V,A) 11/13/2019 1.3  cm^2 Final   • BH CV ECHO AYANNA - PVA(V,D) 11/13/2019 1.3  cm^2 Final   • PA acc time 11/13/2019 0.16  sec Final   • RV V1 max PG 11/13/2019 1.2  mmHg Final   • RV V1 mean PG 11/13/2019 0.62  mmHg Final   • RV V1 max 11/13/2019 53.8  cm/sec Final   • RV V1 mean 11/13/2019 36.7  cm/sec Final   • RV V1 VTI 11/13/2019 12.0  cm Final   • TR max lisa 11/13/2019 231.2  cm/sec Final   • RVSP(TR) 11/13/2019 24.4  mmHg Final   • RAP systole 11/13/2019 3.0  mmHg Final   • PA pr(Accel) 11/13/2019 7.1  mmHg Final   • Qp/Qs 11/13/2019 0.61   Final   • BH CV ECHO AYANNA - BZI_BMI 11/13/2019 30.4  kilograms/m^2 Final   • BH CV  ECHO AYANNA - BSA(HAYCOCK) 11/13/2019 2.1  m^2 Final   • BH CV ECHO AYANNA - BZI_METRIC_WEIGHT 11/13/2019 88.0  kg Final   • BH CV ECHO AYANNA - BZI_METRIC_HEIGHT 11/13/2019 170.2  cm Final   • EF(MOD-bp) 11/13/2019 69.0  % Final   • LA dimension(2D) 11/13/2019 3.7  cm Final   • Target HR (85%) 11/13/2019 129  bpm Final   • Max. Pred. HR (100%) 11/13/2019 152  bpm Final         Assessment/Plan   Age-appropriate Screening Schedule:  Refer to the list below for future screening recommendations based on patient's age, sex and/or medical conditions.      Health Maintenance   Topic Date Due   • TDAP/TD VACCINES (1 - Tdap) 01/09/1961   • ZOSTER VACCINE (2 of 2) 12/13/2019   • INFLUENZA VACCINE  08/01/2020   • LIPID PANEL  11/01/2020   • COLONOSCOPY  11/08/2028       Medicare Risks and Personalized Health Plan:  Advance Directive Discussion  Diabetic Lab Screening       CMS-Preventive Services Quick Reference  Medicare Preventive Services Addressed:  Annual Wellness Visit (AWV)  Diabetes Screening-Lab Order for either glucose quantitative blood (except reagent strip), glucose;post glucose dose(includes glucose), or glucose tolerance test-3 specimens(includes glucose)    Advance Care Planning:  ACP discussion was held with the patient during this visit. Patient does not have an advance directive, information provided.    Diagnoses and all orders for this visit:    1. Wellness examination (Primary)    2. Prediabetes  -     Comprehensive Metabolic Panel  -     Lipid Panel  -     Hemoglobin A1c    3. Mixed hyperlipidemia  -     Comprehensive Metabolic Panel  -     Lipid Panel        An After Visit Summary and PPPS with all of these plans were given to the patient.      Follow Up:  Return in about 1 year (around 8/3/2021) for Medicare Wellness.                                        Answers for HPI/ROS submitted by the patient on 8/2/2020   What is the primary reason for your visit?: Other  Please describe your symptoms.: Bi-annual 6  month  visit with blood testing which has been delayed by Covaid 19., Renewal of Rosuvastin 10mg prescription by Dr. Santa if it is nearing expiration., Otherwise, no issues with me.  Have you had these symptoms before?: Yes  How long have you been having these symptoms?: 1-4 days  Please list any medications you are currently taking for this condition.: N/A  Please describe any probable cause for these symptoms. : N/A

## 2020-08-03 NOTE — PATIENT INSTRUCTIONS
Medicare Wellness  Personal Prevention Plan of Service     Date of Office Visit:  2020  Encounter Provider:  Yvonne Santa MD  Place of Service:  [facility]  Patient Name: Giovani Moseley  :  1950    As part of the Medicare Wellness portion of your visit today, we are providing you with this personalized preventive plan of services (PPPS). This plan is based upon recommendations of the [place] Preventive Services Task Force (USPSTF) and the Advisory Committee on Immunization Practices (ACIP).    This lists the preventive care services that should be considered, and provides dates of when you are due. Items listed as completed are up-to-date and do not require any further intervention.    Health Maintenance   Topic Date Due   • TDAP/TD VACCINES (1 - Tdap) 1962   • Pneumococcal Vaccine Once at 65 Years Old  2016   • HEPATITIS C SCREENING  2019   • MEDICARE ANNUAL WELLNESS  2019   • AAA SCREEN (ONE-TIME)  2019   • ZOSTER VACCINE (2 of 2) 2019   • INFLUENZA VACCINE  2020   • LIPID PANEL  2020   • COLONOSCOPY  2028       Orders Placed This Encounter   Procedures   • Comprehensive Metabolic Panel   • Lipid Panel   • Hemoglobin A1c       Return in about 1 year (around 8/3/2021) for Medicare Wellness.          Advance Directive    Advance directives are legal documents that let you make choices ahead of time about your health care and medical treatment in case you become unable to communicate for yourself. Advance directives are a way for you to communicate your wishes to family, friends, and health care providers. This can help convey your decisions about end-of-life care if you become unable to communicate.  Discussing and writing advance directives should happen over time rather than all at once. Advance directives can be changed depending on your situation and what you want, even after you have signed the advance  directives.  If you do not have an advance directive, some states assign family decision makers to act on your behalf based on how closely you are related to them. Each state has its own laws regarding advance directives. You may want to check with your health care provider, , or state representative about the laws in your state. There are different types of advance directives, such as:  · Medical power of .  · Living will.  · Do not resuscitate (DNR) or do not attempt resuscitation (DNAR) order.  Health care proxy and medical power of   A health care proxy, also called a health care agent, is a person who is appointed to make medical decisions for you in cases in which you are unable to make the decisions yourself. Generally, people choose someone they know well and trust to represent their preferences. Make sure to ask this person for an agreement to act as your proxy. A proxy may have to exercise judgment in the event of a medical decision for which your wishes are not known.  A medical power of  is a legal document that names your health care proxy. Depending on the laws in your state, after the document is written, it may also need to be:  · Signed.  · Notarized.  · Dated.  · Copied.  · Witnessed.  · Incorporated into your medical record.  You may also want to appoint someone to manage your financial affairs in a situation in which you are unable to do so. This is called a durable power of  for finances. It is a separate legal document from the durable power of  for health care. You may choose the same person or someone different from your health care proxy to act as your agent in financial matters.  If you do not appoint a proxy, or if there is a concern that the proxy is not acting in your best interests, a court-appointed guardian may be designated to act on your behalf.  Living will  A living will is a set of instructions documenting your wishes about medical  care when you cannot express them yourself. Health care providers should keep a copy of your living will in your medical record. You may want to give a copy to family members or friends. To alert caregivers in case of an emergency, you can place a card in your wallet to let them know that you have a living will and where they can find it. A living will is used if you become:  · Terminally ill.  · Incapacitated.  · Unable to communicate or make decisions.  Items to consider in your living will include:  · The use or non-use of life-sustaining equipment, such as dialysis machines and breathing machines (ventilators).  · A DNR or DNAR order, which is the instruction not to use cardiopulmonary resuscitation (CPR) if breathing or heartbeat stops.  · The use or non-use of tube feeding.  · Withholding of food and fluids.  · Comfort (palliative) care when the goal becomes comfort rather than a cure.  · Organ and tissue donation.  A living will does not give instructions for distributing your money and property if you should pass away. It is recommended that you seek the advice of a  when writing a will. Decisions about taxes, beneficiaries, and asset distribution will be legally binding. This process can relieve your family and friends of any concerns surrounding disputes or questions that may come up about the distribution of your assets.  DNR or DNAR  A DNR or DNAR order is a request not to have CPR in the event that your heart stops beating or you stop breathing. If a DNR or DNAR order has not been made and shared, a health care provider will try to help any patient whose heart has stopped or who has stopped breathing. If you plan to have surgery, talk with your health care provider about how your DNR or DNAR order will be followed if problems occur.  Summary  · Advance directives are the legal documents that allow you to make choices ahead of time about your health care and medical treatment in case you become  unable to communicate for yourself.  · The process of discussing and writing advance directives should happen over time. You can change the advance directives, even after you have signed them.  · Advance directives include DNR or DNAR orders, living cotter, and designating an agent as your medical power of .  This information is not intended to replace advice given to you by your health care provider. Make sure you discuss any questions you have with your health care provider.  Document Released: 03/26/2009 Document Revised: 01/22/2020 Document Reviewed: 11/06/2017  [employer]vier Patient Education © 2020 [employer]

## 2020-08-05 ENCOUNTER — OFFICE VISIT (OUTPATIENT)
Dept: CARDIOLOGY | Facility: CLINIC | Age: 70
End: 2020-08-05

## 2020-08-05 VITALS
SYSTOLIC BLOOD PRESSURE: 134 MMHG | OXYGEN SATURATION: 98 % | HEART RATE: 60 BPM | WEIGHT: 198 LBS | RESPIRATION RATE: 18 BRPM | DIASTOLIC BLOOD PRESSURE: 80 MMHG | HEIGHT: 67 IN | BODY MASS INDEX: 31.08 KG/M2

## 2020-08-05 DIAGNOSIS — E78.2 MIXED HYPERLIPIDEMIA: Primary | ICD-10-CM

## 2020-08-05 DIAGNOSIS — Z00.00 WELLNESS EXAMINATION: ICD-10-CM

## 2020-08-05 DIAGNOSIS — R73.03 PREDIABETES: ICD-10-CM

## 2020-08-05 PROCEDURE — 99214 OFFICE O/P EST MOD 30 MIN: CPT | Performed by: INTERNAL MEDICINE

## 2020-08-05 NOTE — PROGRESS NOTES
Subjective:     Encounter Date:08/05/2020      Patient ID: Giovani Moseley is a 70 y.o. male.    Chief Complaint:  Chief Complaint   Patient presents with   • Hyperlipidemia       HPI:  Giovani is a very pleasant 70-year-old male patient who is new to me who presents for establishing care.  He is a transplant from [facility].  He has coronary artery risk factor significant only for previous tobacco use and dyslipidemia.  He is on no other medications save rosuvastatin 10 mg p.o. daily.  Reportedly he underwent stress testing 3 years ago which was normal.  He is never undergone cardiac catheterization.  I personally reviewed his electric cardiogram from 1/18/2019 which showed sinus bradycardia and a mildly late transition in the precordial leads.  I personally reviewed his most recent labs showed an LDL of 129 and HDL of 65.    He has no complaints from cardiovascular standpoint.  He is concerned that his brother had recently been diagnosed with a abdominal aortic aneurysm.  He is asymptomatic.    The following portions of the patient's history were reviewed and updated as appropriate: allergies, current medications, past family history, past medical history, past social history, past surgical history and problem list.    Problem List:  Patient Active Problem List   Diagnosis   • Hyperlipidemia   • Prediabetes       Past Medical History:  Past Medical History:   Diagnosis Date   • Abnormal ECG 01/18/2019    I believe past submitted records reflect such   • Benign prostate hyperplasia    • Congenital heart 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    S/A   • Coronary artery 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    S/A   • Hyperlipidemia    • Sleep apnea 01/31/08    conflicting opinions from different specialists in 2008 and       Past Surgical History:  Past Surgical History:   Procedure Laterality Date   • EYE SURGERY Bilateral 10/3 and 10/15   • TONSILLECTOMY         Social History:  Social History     Socioeconomic History   • Marital status:       "Spouse name: Not on file   • Number of children: Not on file   • Years of education: Not on file   • Highest education level: Not on file   Occupational History   • 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   Tobacco Use   • Smoking status: Former Smoker     Packs/day: 0.50     Years: 30.00     Pack years: 15.00     Types: Cigarettes     Start date: 1966     Last attempt to quit: 1995     Years since quittin.6   • Smokeless tobacco: Never Used   • Tobacco comment: Stopped in mid    Substance and Sexual Activity   • Alcohol use: Yes     Types: 2 Cans of beer per week     Frequency: Monthly or less     Comment: light drinker when socializing   • Drug use: No   • Sexual activity: Never       Allergies:  No Known Allergies      Review of Symptoms:  Constitutional: Patient afebrile no chills or unexpected weight changes  Respiratory: No cough, no wheezing or dyspnea  Cardiovascular: Today the patient complains of no chest pain, palpitations, dyspnea, orthopnea and no edema  Gastrointestinal: No nausea, vomiting, constipation or diarrhea.  No melena or dark stools    All other systems reviewed and are negative         Objective:         /80 (BP Location: Left arm, Patient Position: Sitting, Cuff Size: Large Adult)   Pulse 60   Resp 18   Ht 170.2 cm (67\")   Wt 89.8 kg (198 lb)   SpO2 98%   BMI 31.01 kg/m²     Physical exam  Constitutional: well-nourished, and appears stated age in no acute distress  PERRL: Conjunctiva clear, no pallor, anicteric  HENMT: normocephalic, normal dentition, no cyanosis or pallor  Neck:no bruits, or thrills and bilateral normal carotid upstroke. Normal jugular venous pressure  Cardiovascular: No parasternal heaves an non-displaced focal PMI. Normal rate and rhythm: no rub, gallop, murmur or click and normal S1 and S2; no lower or upper extremity edema.   Lungs: unlabored, no wheezing with no rales or rhonchi on auscultation.  Extremities: Warm, no clubbing, " cyanosis. Full and equal peripheral pulses in extremities with no bruits appreciated.   Abdomen: soft, non-tender, non-distended  Musculoskeletal: no joint tenderness or swelling and no erythema  Skin: Warm and dry, non-erythematous   Neuro:alert and normal affect. Oriented to time, place and person.             In-Office Procedure(s):  Procedures    ASCVD RIsk Score::  The ASCVD Risk score (Juliastiven LACNE Jr., et al., 2013) failed to calculate for the following reasons:    The valid total cholesterol range is 130 to 320 mg/dL    Recent Radiology:  Imaging Results (Most Recent)     None          Lab Review:   Office Visit on 08/03/2020   Component Date Value   • Glucose 08/03/2020 98    • BUN 08/03/2020 13    • Creatinine 08/03/2020 0.90    • Sodium 08/03/2020 138    • Potassium 08/03/2020 4.1    • Chloride 08/03/2020 102    • CO2 08/03/2020 26.2    • Calcium 08/03/2020 9.5    • Total Protein 08/03/2020 6.7    • Albumin 08/03/2020 4.50    • ALT (SGPT) 08/03/2020 36    • AST (SGOT) 08/03/2020 20    • Alkaline Phosphatase 08/03/2020 41    • Total Bilirubin 08/03/2020 2.0*   • eGFR Non  Amer 08/03/2020 84    • Globulin 08/03/2020 2.2    • A/G Ratio 08/03/2020 2.0    • BUN/Creatinine Ratio 08/03/2020 14.4    • Anion Gap 08/03/2020 9.8    • Total Cholesterol 08/03/2020 129    • Triglycerides 08/03/2020 73    • HDL Cholesterol 08/03/2020 57    • LDL Cholesterol  08/03/2020 57    • VLDL Cholesterol 08/03/2020 14.6    • LDL/HDL Ratio 08/03/2020 1.01    • Hemoglobin A1C 08/03/2020 5.3         Results from last 7 days   Lab Units 08/03/20  0825   SODIUM mmol/L 138   POTASSIUM mmol/L 4.1   CHLORIDE mmol/L 102   CO2 mmol/L 26.2   BUN mg/dL 13   CREATININE mg/dL 0.90   GLUCOSE mg/dL 98   CALCIUM mg/dL 9.5   AST (SGOT) U/L 20   ALT (SGPT) U/L 36                     Results from last 7 days   Lab Units 08/03/20  0825   CHOLESTEROL mg/dL 129   TRIGLYCERIDES mg/dL 73   HDL CHOL mg/dL 57                   Assessment:           Diagnosis Plan   1. Mixed hyperlipidemia     2. Prediabetes     3. Wellness examination            Plan:         1. Mixed hyperlipidemia  Attempting nutritional changes to maintain a triglyceride to HDL ratio of 2 or less.    2. Prediabetes  Nutritional management    3.  Abdominal aortic aneurysm screen and carotid artery screening  -Get these at [facility]    4. Asymptomatic bradycardia  Stable       Hollis Redd MD  08/05/20  .

## 2020-12-16 ENCOUNTER — OFFICE VISIT (OUTPATIENT)
Dept: NEUROLOGY | Facility: CLINIC | Age: 70
End: 2020-12-16

## 2020-12-16 VITALS
DIASTOLIC BLOOD PRESSURE: 84 MMHG | SYSTOLIC BLOOD PRESSURE: 146 MMHG | HEART RATE: 52 BPM | HEIGHT: 67 IN | WEIGHT: 200.6 LBS | BODY MASS INDEX: 31.48 KG/M2 | TEMPERATURE: 97.5 F

## 2020-12-16 DIAGNOSIS — G47.01 INSOMNIA DUE TO MEDICAL CONDITION: ICD-10-CM

## 2020-12-16 DIAGNOSIS — R41.3 MEMORY LOSS: ICD-10-CM

## 2020-12-16 DIAGNOSIS — G47.33 OBSTRUCTIVE SLEEP APNEA SYNDROME: Primary | ICD-10-CM

## 2020-12-16 DIAGNOSIS — E66.09 CLASS 1 OBESITY DUE TO EXCESS CALORIES WITH BODY MASS INDEX (BMI) OF 31.0 TO 31.9 IN ADULT, UNSPECIFIED WHETHER SERIOUS COMORBIDITY PRESENT: ICD-10-CM

## 2020-12-16 PROCEDURE — 99214 OFFICE O/P EST MOD 30 MIN: CPT | Performed by: PSYCHIATRY & NEUROLOGY

## 2020-12-16 RX ORDER — DOXEPIN HYDROCHLORIDE 10 MG/1
10 CAPSULE ORAL NIGHTLY
Qty: 30 CAPSULE | Refills: 11 | Status: SHIPPED | OUTPATIENT
Start: 2020-12-16 | End: 2022-01-03 | Stop reason: SDUPTHER

## 2020-12-16 RX ORDER — SILODOSIN 8 MG/1
CAPSULE ORAL
COMMUNITY
Start: 2020-10-08

## 2020-12-16 RX ORDER — UBIDECARENONE 100 MG
100 CAPSULE ORAL DAILY
COMMUNITY
End: 2022-01-05

## 2020-12-16 NOTE — PROGRESS NOTES
Sleep medicine follow-up visit    Giovani Moseley   1950  70 y.o. male   DATE OF SERVICE: 12/16/2020     MIREYA, yearly f/u for CPAP compliance, patient doing well with pap therapy. He uses a FFM and goes through "[facility] in [facility] for supplies. Patient has a overactive bladder and wakes up during the night on oc has problems with getting back to sleep. Patient currently see's a Urologist and takes medication for this issue.     SLEEP TESTING HISTORY:    First diagnosed in 2008 with ahi of 18  Repeat npsg in 2016 with ahi 2.4 and rdi 20     The compliance data reviewed and the patient is on CPAP therapy at 4-20 cm/H2O and compliance data indicates excellent compliance with 83% usage for more than 4 hours with an average usage of 6 hours 15 minutes. AHI down to 3.5 with CPAP therapy and mean CPAP pressure 4-20 cm of water.      The patient's hypersomnia also resolved with Healy Sleepiness Scale score of 1 with CPAP therapy.  The patient feels great and is benefiting from it and is compliant.     Obesity, BMI-31.4    EPWORTH SLEEPINESS SCALE  Sitting and reading 0 WatchingTV 0  Sitting, inactive, in a public place 0  As a passenger in a car for 1 hour w/o a break  0  Lying down to rest in the afternoon  1  Sitting and talking to someone  0  Sitting quietly after a lunch  0  In a car, while stopped for traffic or a light  0  Total 1    Pt reports some difficulty with memory , noted by his wife and he notes some difficulty remembering names    Review of Systems   Constitutional: Negative for appetite change and fatigue.   HENT: Negative for sinus pressure and sinus pain.    Eyes: Negative for pain, itching and visual disturbance.   Respiratory: Positive for apnea. Negative for cough and shortness of breath.    Cardiovascular: Negative for chest pain and palpitations.   Gastrointestinal: Negative for constipation and diarrhea.   Endocrine: Negative for cold intolerance and heat intolerance.   Genitourinary:  Positive for frequency. Negative for difficulty urinating.   Musculoskeletal: Negative for back pain and gait problem.   Allergic/Immunologic: Negative for environmental allergies.   Neurological: Negative for dizziness, tremors, seizures, syncope, facial asymmetry, speech difficulty, weakness, light-headedness, numbness and headaches.   Psychiatric/Behavioral: Negative for agitation and confusion.     I reviewed and addressed ROS entered by MA.    History of Present Illness, MIREYA    The following portions of the patient's history were reviewed and updated as appropriate: allergies, current medications, past family history, past medical history, past social history, past surgical history and problem list.      Family History   Problem Relation Age of Onset   • Pancreatic cancer Mother    • Alzheimer's disease Father        Past Medical History:   Diagnosis Date   • Abnormal ECG 2019    I believe past submitted records reflect such   • Benign prostate hyperplasia    • Cataract    • Congenital heart disease 2015    S/A   • Coronary artery disease 2015    S/A   • Hyperlipidemia    • Sleep apnea 08    conflicting opinions from different specialists in 2008 and       Social History     Socioeconomic History   • Marital status:      Spouse name: Not on file   • Number of children: Not on file   • Years of education: Not on file   • Highest education level: Not on file   Occupational History   • 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   Tobacco Use   • Smoking status: Former Smoker     Packs/day: 0.50     Years: 30.00     Pack years: 15.00     Types: Cigarettes     Start date: 1966     Quit date: 1995     Years since quittin.9   • Smokeless tobacco: Never Used   • Tobacco comment: Stopped in mid 90s   Substance and Sexual Activity   • Alcohol use: Yes     Types: 2 Cans of beer per week     Frequency: Monthly or less     Comment: light drinker when socializing   • Drug use: No    • Sexual activity: Never         Current Outpatient Medications:   •  coenzyme Q10 100 MG capsule, Take 100 mg by mouth Daily., Disp: , Rfl:   •  rosuvastatin (CRESTOR) 10 MG tablet, Take 1 tablet by mouth Daily., Disp: 90 tablet, Rfl: 3  •  silodosin (RAPAFLO) 8 MG capsule capsule, TAKE ONE (1) CAPSULE BY MOUTH EVERY DAY, Disp: , Rfl:     No Known Allergies     PHYSICAL EXAMINATION:  Vitals:    12/16/20 1720   BP: 146/84   Pulse: 52   Temp: 97.5 °F (36.4 °C)      Body mass index is 31.42 kg/m².       HEENT: Normal.      EXTREMITIES: No edema.     Diagnoses and all orders for this visit:    1. Obstructive sleep apnea syndrome (Primary)    2. Class 1 obesity due to excess calories with body mass index (BMI) of 31.0 to 31.9 in adult, unspecified whether serious comorbidity present    3. Memory loss  -     Vitamin B12; Future  -     Folate; Future  -     TSH; Future  -     Vitamin D 1,25 Dihydroxy; Future       Will obtain labs for co memory  Continue cpap  For insomnia rx low dose doxepin  Pt encourage to lose weight    This document has been electronically signed by Joseph Seipel, MD on December 16, 2020 17:40 EST

## 2020-12-18 ENCOUNTER — LAB (OUTPATIENT)
Dept: LAB | Facility: HOSPITAL | Age: 70
End: 2020-12-18

## 2020-12-18 DIAGNOSIS — R41.3 MEMORY LOSS: ICD-10-CM

## 2020-12-18 LAB
FOLATE SERPL-MCNC: 14.2 NG/ML (ref 4.78–24.2)
TSH SERPL DL<=0.05 MIU/L-ACNC: 1.64 UIU/ML (ref 0.27–4.2)
VIT B12 BLD-MCNC: 334 PG/ML (ref 211–946)

## 2020-12-18 PROCEDURE — 82607 VITAMIN B-12: CPT

## 2020-12-18 PROCEDURE — 36415 COLL VENOUS BLD VENIPUNCTURE: CPT

## 2020-12-18 PROCEDURE — 82746 ASSAY OF FOLIC ACID SERUM: CPT

## 2020-12-18 PROCEDURE — 84443 ASSAY THYROID STIM HORMONE: CPT

## 2020-12-18 PROCEDURE — 82652 VIT D 1 25-DIHYDROXY: CPT

## 2020-12-21 LAB — 1,25(OH)2D SERPL-MCNC: 44.2 PG/ML (ref 19.9–79.3)

## 2021-03-31 ENCOUNTER — OFFICE VISIT (OUTPATIENT)
Dept: CARDIOLOGY | Facility: CLINIC | Age: 71
End: 2021-03-31

## 2021-03-31 VITALS
WEIGHT: 186 LBS | BODY MASS INDEX: 29.19 KG/M2 | HEIGHT: 67 IN | HEART RATE: 56 BPM | SYSTOLIC BLOOD PRESSURE: 122 MMHG | DIASTOLIC BLOOD PRESSURE: 68 MMHG | RESPIRATION RATE: 18 BRPM

## 2021-03-31 DIAGNOSIS — E78.2 MIXED HYPERLIPIDEMIA: ICD-10-CM

## 2021-03-31 DIAGNOSIS — N28.9 RENAL INSUFFICIENCY: ICD-10-CM

## 2021-03-31 DIAGNOSIS — G47.33 OBSTRUCTIVE SLEEP APNEA SYNDROME: ICD-10-CM

## 2021-03-31 DIAGNOSIS — I71.40 AAA (ABDOMINAL AORTIC ANEURYSM) WITHOUT RUPTURE (HCC): ICD-10-CM

## 2021-03-31 DIAGNOSIS — R10.84 GENERALIZED ABDOMINAL PAIN: ICD-10-CM

## 2021-03-31 DIAGNOSIS — I77.9 BILATERAL CAROTID ARTERY DISEASE, UNSPECIFIED TYPE (HCC): ICD-10-CM

## 2021-03-31 DIAGNOSIS — R09.89 BILATERAL CAROTID BRUITS: ICD-10-CM

## 2021-03-31 DIAGNOSIS — R73.03 PREDIABETES: Primary | ICD-10-CM

## 2021-03-31 PROBLEM — E88.810 METABOLIC SYNDROME: Status: ACTIVE | Noted: 2021-03-31

## 2021-03-31 PROBLEM — E88.81 METABOLIC SYNDROME: Status: ACTIVE | Noted: 2021-03-31

## 2021-03-31 PROCEDURE — 99214 OFFICE O/P EST MOD 30 MIN: CPT | Performed by: INTERNAL MEDICINE

## 2021-03-31 NOTE — PROGRESS NOTES
Subjective:     Encounter Date:03/31/2021      Patient ID: Giovani Moseley is a 71 y.o. male.    Chief Complaint:  Chief Complaint   Patient presents with   • Follow-up       HPI:  Giovani is a very pleasant 71-year-old male patient who is new to me who presents for establishing care.  He is a transplant from [facility].  He has coronary artery risk factor significant only for previous tobacco use and dyslipidemia.  He is on no other medications save rosuvastatin 10 mg p.o. daily.  Reportedly he underwent stress testing 3 years ago which was normal.  He is never undergone cardiac catheterization.  I personally reviewed his electric cardiogram from 1/18/2019 which showed sinus bradycardia and a mildly late transition in the precordial leads.  I personally reviewed his most recent labs showed an LDL of 129 and HDL of 65.    He is concerned that his brother had recently been diagnosed with a abdominal aortic aneurysm.  He is asymptomatic.      The following portions of the patient's history were reviewed and updated as appropriate: allergies, current medications, past family history, past medical history, past social history, past surgical history and problem list.    Problem List:  Patient Active Problem List   Diagnosis   • Hyperlipidemia   • Prediabetes   • Benign localized hyperplasia of prostate   • Obstructive sleep apnea syndrome   • Metabolic syndrome   • AAA (abdominal aortic aneurysm) without rupture (CMS/HCC)       Past Medical History:  Past Medical History:   Diagnosis Date   • AAA (abdominal aortic aneurysm) without rupture (CMS/HCC) 3/31/2021   • Abnormal ECG 01/18/2019    I believe past submitted records reflect such   • Benign prostate hyperplasia    • Cataract    • Congenital heart 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    S/A   • Coronary artery 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    S/A   • Hyperlipidemia    • Metabolic syndrome 3/31/2021   • Sleep apnea 01/31/08    conflicting opinions from different specialists in 2008 and       Past  "Surgical History:  Past Surgical History:   Procedure Laterality Date   • EYE SURGERY Bilateral 10/3 and 10/15   • TONSILLECTOMY         Social History:  Social History     Socioeconomic History   • Marital status:      Spouse name: Not on file   • Number of children: Not on file   • Years of education: Not on file   • Highest education level: Not on file   Tobacco Use   • Smoking status: Former Smoker     Packs/day: 0.50     Years: 30.00     Pack years: 15.00     Types: Cigarettes     Start date: 1966     Quit date: 1995     Years since quittin.2   • Smokeless tobacco: Never Used   • Tobacco comment: Stopped in mid    Substance and Sexual Activity   • Alcohol use: Yes     Types: 2 Cans of beer per week     Comment: light drinker when socializing   • Drug use: No   • Sexual activity: Never       Allergies:  No Known Allergies      Review of Symptoms:  Constitutional: Patient afebrile no chills or unexpected weight changes  Respiratory: No cough, no wheezing or dyspnea  Cardiovascular: Today the patient complains of no chest pain, palpitations, dyspnea, orthopnea and no edema  Gastrointestinal: No nausea, vomiting, constipation or diarrhea.  No melena or dark stools    All other systems reviewed and are negative             Objective:         /68 (BP Location: Left arm, Patient Position: Sitting)   Pulse 56   Resp 18   Ht 170.2 cm (67\")   Wt 84.4 kg (186 lb)   BMI 29.13 kg/m²     Physical exam  Constitutional: well-nourished, and appears stated age in no acute distress  PERRL: Conjunctiva clear, no pallor, anicteric  HENMT: normocephalic, normal dentition, no cyanosis or pallor  Neck:no bruits, or thrills and bilateral normal carotid upstroke. Normal jugular venous pressure  Cardiovascular: No parasternal heaves an non-displaced focal PMI. Normal rate and rhythm: no rub, gallop, murmur or click and normal S1 and S2; no lower or upper extremity edema.   Lungs: unlabored, no wheezing " with no rales or rhonchi on auscultation.  Extremities: Warm, no clubbing, cyanosis. Full and equal peripheral pulses in extremities with no bruits appreciated.   Abdomen: soft, non-tender, non-distended  Musculoskeletal: no joint tenderness or swelling and no erythema  Skin: Warm and dry, non-erythematous   Neuro:alert and normal affect. Oriented to time, place and person.           In-Office Procedure(s):  Procedures    ASCVD RIsk Score::  The ASCVD Risk score (Julia NATALIO Jr., et al., 2013) failed to calculate for the following reasons:    The valid total cholesterol range is 130 to 320 mg/dL    Recent Radiology:  Imaging Results (Most Recent)     None          Lab Review:   No visits with results within 2 Month(s) from this visit.   Latest known visit with results is:   Lab on 12/18/2020   Component Date Value   • Vitamin B-12 12/18/2020 334    • Folate 12/18/2020 14.20    • TSH 12/18/2020 1.640    • 1,25-Dihydroxy, Vitamin D 12/18/2020 44.2               Invalid input(s): ALKPO4                        Invalid input(s): LDLCALC                Assessment:          Diagnosis Plan   1. Prediabetes     2. Mixed hyperlipidemia     3. Obstructive sleep apnea syndrome     4. AAA (abdominal aortic aneurysm) without rupture (CMS/HCC)            Plan:         1. Prediabetes  New exercise and nutritional changes as primary prevention    2. Mixed hyperlipidemia  See above    3. Obstructive sleep apnea syndrome  Stable.    4. AAA (abdominal aortic aneurysm) without rupture (CMS/HCC)  We will do triple screening renal abdominal and carotid ultrasounds                 Hollis Redd MD  03/31/21  .

## 2021-04-08 ENCOUNTER — TRANSCRIBE ORDERS (OUTPATIENT)
Dept: ADMINISTRATIVE | Facility: HOSPITAL | Age: 71
End: 2021-04-08

## 2021-04-27 ENCOUNTER — HOSPITAL ENCOUNTER (OUTPATIENT)
Dept: ULTRASOUND IMAGING | Facility: HOSPITAL | Age: 71
Discharge: HOME | End: 2021-04-27

## 2021-04-27 ENCOUNTER — HOSPITAL ENCOUNTER (OUTPATIENT)
Dept: CARDIOLOGY | Facility: HOSPITAL | Age: 71
Discharge: HOME | End: 2021-04-27

## 2021-04-27 DIAGNOSIS — N28.9 RENAL INSUFFICIENCY: ICD-10-CM

## 2021-04-27 DIAGNOSIS — I77.9 BILATERAL CAROTID ARTERY DISEASE, UNSPECIFIED TYPE (HCC): ICD-10-CM

## 2021-04-27 DIAGNOSIS — I71.40 AAA (ABDOMINAL AORTIC ANEURYSM) WITHOUT RUPTURE (HCC): ICD-10-CM

## 2021-04-27 DIAGNOSIS — R09.89 BILATERAL CAROTID BRUITS: ICD-10-CM

## 2021-04-27 LAB
BH CV XLRA MEAS LEFT CCA RATIO VEL: 127 CM/SEC
BH CV XLRA MEAS LEFT DIST CCA EDV: -20.4 CM/SEC
BH CV XLRA MEAS LEFT DIST CCA PSV: -98.8 CM/SEC
BH CV XLRA MEAS LEFT DIST ICA EDV: -24.4 CM/SEC
BH CV XLRA MEAS LEFT DIST ICA PSV: -71.5 CM/SEC
BH CV XLRA MEAS LEFT ICA RATIO VEL: -72.2 CM/SEC
BH CV XLRA MEAS LEFT ICA/CCA RATIO: -0.57
BH CV XLRA MEAS LEFT PROX CCA EDV: 27.7 CM/SEC
BH CV XLRA MEAS LEFT PROX CCA PSV: 127 CM/SEC
BH CV XLRA MEAS LEFT PROX ECA PSV: -95.3 CM/SEC
BH CV XLRA MEAS LEFT PROX ICA EDV: -21 CM/SEC
BH CV XLRA MEAS LEFT PROX ICA PSV: -72.2 CM/SEC
BH CV XLRA MEAS LEFT PROX SCLA PSV: 170 CM/SEC
BH CV XLRA MEAS LEFT VERTEBRAL A EDV: 7.9 CM/SEC
BH CV XLRA MEAS LEFT VERTEBRAL A PSV: 36 CM/SEC
BH CV XLRA MEAS RIGHT CCA RATIO VEL: 92.6 CM/SEC
BH CV XLRA MEAS RIGHT DIST CCA EDV: 21.7 CM/SEC
BH CV XLRA MEAS RIGHT DIST CCA PSV: 91.3 CM/SEC
BH CV XLRA MEAS RIGHT DIST ICA EDV: -27 CM/SEC
BH CV XLRA MEAS RIGHT DIST ICA PSV: -70.3 CM/SEC
BH CV XLRA MEAS RIGHT ICA RATIO VEL: -70.3 CM/SEC
BH CV XLRA MEAS RIGHT ICA/CCA RATIO: -0.76
BH CV XLRA MEAS RIGHT PROX CCA EDV: 19.9 CM/SEC
BH CV XLRA MEAS RIGHT PROX CCA PSV: 92.6 CM/SEC
BH CV XLRA MEAS RIGHT PROX ECA PSV: -97.5 CM/SEC
BH CV XLRA MEAS RIGHT PROX ICA EDV: -21.1 CM/SEC
BH CV XLRA MEAS RIGHT PROX ICA PSV: -69 CM/SEC
BH CV XLRA MEAS RIGHT PROX SCLA PSV: 129 CM/SEC
BH CV XLRA MEAS RIGHT VERTEBRAL A EDV: 11.8 CM/SEC
BH CV XLRA MEAS RIGHT VERTEBRAL A PSV: 49.7 CM/SEC
LEFT ARM BP: NORMAL MMHG
RIGHT ARM BP: NORMAL MMHG

## 2021-04-27 PROCEDURE — 76775 US EXAM ABDO BACK WALL LIM: CPT

## 2021-04-27 PROCEDURE — 93880 EXTRACRANIAL BILAT STUDY: CPT

## 2021-09-07 ENCOUNTER — TELEPHONE (OUTPATIENT)
Dept: NEUROLOGY | Facility: CLINIC | Age: 71
End: 2021-09-07

## 2021-09-07 NOTE — TELEPHONE ENCOUNTER
First diagnosed in 2008 with ahi of 18  Repeat npsg in 2016 with ahi 2.4 and rdi 20  83% USAGE    PRE-DIABETIC

## 2021-09-07 NOTE — TELEPHONE ENCOUNTER
Caller:  DEVYN TERRELL   Relationship:PT  Best call back number: [phone]    Have you registered the machine with [facility]: [x]Yes []No    How old is the current machine: 2-3 YEARS OLD- PT DOES STILL HAVE THE OLDER MACHINE BUT PTS S/O STATES THAT IT MALFUNCTIONED- ALL THE MACHINES HE HAS CURRENTLY ARE ON THE RECALL.     PT HAS D/C USE OF THE MACHINE @ THIS CURRENT TIME.    Who is the DME:  [facility] (IN [place]) SUPPLIER HAS CHANGED.     [facility] SMS [phone]

## 2021-09-15 NOTE — TELEPHONE ENCOUNTER
Spoke with patient and his wife regarding machine. He knows he is to resume use if quality of life is affected.

## 2021-10-06 ENCOUNTER — OFFICE VISIT (OUTPATIENT)
Dept: CARDIOLOGY | Facility: CLINIC | Age: 71
End: 2021-10-06

## 2021-10-06 VITALS
WEIGHT: 195 LBS | OXYGEN SATURATION: 97 % | SYSTOLIC BLOOD PRESSURE: 110 MMHG | BODY MASS INDEX: 31.34 KG/M2 | DIASTOLIC BLOOD PRESSURE: 72 MMHG | RESPIRATION RATE: 18 BRPM | HEIGHT: 66 IN | HEART RATE: 54 BPM

## 2021-10-06 DIAGNOSIS — I71.40 AAA (ABDOMINAL AORTIC ANEURYSM) WITHOUT RUPTURE (HCC): Primary | ICD-10-CM

## 2021-10-06 DIAGNOSIS — R73.03 PREDIABETES: ICD-10-CM

## 2021-10-06 DIAGNOSIS — E78.2 MIXED HYPERLIPIDEMIA: ICD-10-CM

## 2021-10-06 DIAGNOSIS — G47.33 OBSTRUCTIVE SLEEP APNEA SYNDROME: ICD-10-CM

## 2021-10-06 PROCEDURE — 99214 OFFICE O/P EST MOD 30 MIN: CPT | Performed by: INTERNAL MEDICINE

## 2021-10-06 RX ORDER — MULTIVIT WITH MINERALS/LUTEIN
250 TABLET ORAL DAILY
COMMUNITY

## 2021-10-06 RX ORDER — ASPIRIN 81 MG/1
81 TABLET ORAL DAILY
COMMUNITY
End: 2022-01-03

## 2022-01-03 ENCOUNTER — TELEPHONE (OUTPATIENT)
Dept: NEUROLOGY | Facility: CLINIC | Age: 72
End: 2022-01-03

## 2022-01-03 ENCOUNTER — OFFICE VISIT (OUTPATIENT)
Dept: NEUROLOGY | Facility: CLINIC | Age: 72
End: 2022-01-03

## 2022-01-03 VITALS
TEMPERATURE: 97.3 F | SYSTOLIC BLOOD PRESSURE: 153 MMHG | HEIGHT: 66 IN | WEIGHT: 197 LBS | BODY MASS INDEX: 31.66 KG/M2 | HEART RATE: 51 BPM | RESPIRATION RATE: 16 BRPM | DIASTOLIC BLOOD PRESSURE: 80 MMHG

## 2022-01-03 DIAGNOSIS — G47.33 OBSTRUCTIVE SLEEP APNEA SYNDROME: Primary | ICD-10-CM

## 2022-01-03 DIAGNOSIS — G47.01 INSOMNIA DUE TO MEDICAL CONDITION: ICD-10-CM

## 2022-01-03 DIAGNOSIS — G47.33 OBSTRUCTIVE SLEEP APNEA: Primary | ICD-10-CM

## 2022-01-03 PROCEDURE — 99214 OFFICE O/P EST MOD 30 MIN: CPT | Performed by: PSYCHIATRY & NEUROLOGY

## 2022-01-03 RX ORDER — DOXEPIN HYDROCHLORIDE 10 MG/1
10 CAPSULE ORAL NIGHTLY
Qty: 30 CAPSULE | Refills: 11 | Status: SHIPPED | OUTPATIENT
Start: 2022-01-03 | End: 2023-01-03 | Stop reason: SDUPTHER

## 2022-01-03 NOTE — TELEPHONE ENCOUNTER
----- Message from Joseph F Seipel, MD sent at 1/3/2022 10:31 AM EST -----   FFM and [facility] [place]    Pended order for supplies

## 2022-01-05 ENCOUNTER — OFFICE VISIT (OUTPATIENT)
Dept: FAMILY MEDICINE CLINIC | Facility: CLINIC | Age: 72
End: 2022-01-05

## 2022-01-05 ENCOUNTER — LAB (OUTPATIENT)
Dept: FAMILY MEDICINE CLINIC | Facility: CLINIC | Age: 72
End: 2022-01-05

## 2022-01-05 VITALS
HEART RATE: 48 BPM | OXYGEN SATURATION: 97 % | DIASTOLIC BLOOD PRESSURE: 88 MMHG | SYSTOLIC BLOOD PRESSURE: 122 MMHG | BODY MASS INDEX: 30.99 KG/M2 | WEIGHT: 192 LBS | TEMPERATURE: 97.1 F

## 2022-01-05 DIAGNOSIS — Z12.5 SCREENING FOR PROSTATE CANCER: ICD-10-CM

## 2022-01-05 DIAGNOSIS — Z00.00 MEDICARE ANNUAL WELLNESS VISIT, SUBSEQUENT: Primary | ICD-10-CM

## 2022-01-05 PROBLEM — R73.03 PREDIABETES: Status: RESOLVED | Noted: 2019-11-01 | Resolved: 2022-01-05

## 2022-01-05 LAB
ALBUMIN SERPL-MCNC: 4.7 G/DL (ref 3.5–5.2)
ALBUMIN/GLOB SERPL: 2.5 G/DL
ALP SERPL-CCNC: 46 U/L (ref 39–117)
ALT SERPL W P-5'-P-CCNC: 19 U/L (ref 1–41)
ANION GAP SERPL CALCULATED.3IONS-SCNC: 6.2 MMOL/L (ref 5–15)
AST SERPL-CCNC: 19 U/L (ref 1–40)
BILIRUB SERPL-MCNC: 1.9 MG/DL (ref 0–1.2)
BUN SERPL-MCNC: 12 MG/DL (ref 8–23)
BUN/CREAT SERPL: 12.2 (ref 7–25)
CALCIUM SPEC-SCNC: 9.5 MG/DL (ref 8.6–10.5)
CHLORIDE SERPL-SCNC: 103 MMOL/L (ref 98–107)
CHOLEST SERPL-MCNC: 198 MG/DL (ref 0–200)
CO2 SERPL-SCNC: 29.8 MMOL/L (ref 22–29)
CREAT SERPL-MCNC: 0.98 MG/DL (ref 0.76–1.27)
GFR SERPL CREATININE-BSD FRML MDRD: 76 ML/MIN/1.73
GLOBULIN UR ELPH-MCNC: 1.9 GM/DL
GLUCOSE SERPL-MCNC: 97 MG/DL (ref 65–99)
HDLC SERPL-MCNC: 59 MG/DL (ref 40–60)
LDLC SERPL CALC-MCNC: 124 MG/DL (ref 0–100)
LDLC/HDLC SERPL: 2.08 {RATIO}
POTASSIUM SERPL-SCNC: 4.4 MMOL/L (ref 3.5–5.2)
PROT SERPL-MCNC: 6.6 G/DL (ref 6–8.5)
PSA SERPL-MCNC: 3.01 NG/ML (ref 0–4)
SODIUM SERPL-SCNC: 139 MMOL/L (ref 136–145)
TRIGL SERPL-MCNC: 81 MG/DL (ref 0–150)
VLDLC SERPL-MCNC: 15 MG/DL (ref 5–40)

## 2022-01-05 PROCEDURE — 80061 LIPID PANEL: CPT | Performed by: FAMILY MEDICINE

## 2022-01-05 PROCEDURE — 1159F MED LIST DOCD IN RCRD: CPT | Performed by: FAMILY MEDICINE

## 2022-01-05 PROCEDURE — G0439 PPPS, SUBSEQ VISIT: HCPCS | Performed by: FAMILY MEDICINE

## 2022-01-05 PROCEDURE — 1170F FXNL STATUS ASSESSED: CPT | Performed by: FAMILY MEDICINE

## 2022-01-05 PROCEDURE — 36415 COLL VENOUS BLD VENIPUNCTURE: CPT | Performed by: FAMILY MEDICINE

## 2022-01-05 PROCEDURE — 80053 COMPREHEN METABOLIC PANEL: CPT | Performed by: FAMILY MEDICINE

## 2022-01-05 PROCEDURE — G0103 PSA SCREENING: HCPCS | Performed by: FAMILY MEDICINE

## 2022-01-05 NOTE — PROGRESS NOTES
The ABCs of the Annual Wellness Visit  Subsequent Medicare Wellness Visit    Chief Complaint   Patient presents with   • Medicare Wellness-subsequent     fasting       Subjective    History of Present Illness:  Giovani Moseley is a 72 y.o. male who presents for a Subsequent Medicare Wellness Visit.    The following portions of the patient's history were reviewed and   updated as appropriate: allergies, current medications, past family history, past medical history, past social history, past surgical history and problem list.    Compared to one year ago, the patient feels his physical   health is the same.    Compared to one year ago, the patient feels his mental   health is the same.    Recent Hospitalizations:  He was not admitted to the hospital during the last year.       Current Medical Providers:  Patient Care Team:  Yvonne Santa MD as PCP - General (Family Medicine)  Patrick Crane MD as Consulting Physician (Urology)  Seipel, Joseph F, MD as Consulting Physician (Sleep Medicine)  Hollis Redd MD as Consulting Physician (Cardiology)  Alexey Stuart MD as Consulting Physician (Ophthalmology)    Outpatient Medications Prior to Visit   Medication Sig Dispense Refill   • doxepin (SINEquan) 10 MG capsule Take 1 capsule by mouth Every Night. 30 capsule 11   • silodosin (RAPAFLO) 8 MG capsule capsule TAKE ONE (1) CAPSULE BY MOUTH EVERY DAY     • vitamin C (ASCORBIC ACID) 250 MG tablet Take 250 mg by mouth Daily.     • coenzyme Q10 100 MG capsule Take 100 mg by mouth Daily.       No facility-administered medications prior to visit.       No opioid medication identified on active medication list. I have reviewed chart for other potential  high risk medication/s and harmful drug interactions in the elderly.          Aspirin is not on active medication list.  Aspirin use is not indicated based on review of current medical condition/s. Risk of harm outweighs potential benefits.  .    Patient  Active Problem List   Diagnosis   • Hyperlipidemia   • Benign localized hyperplasia of prostate   • Obstructive sleep apnea syndrome   • Metabolic syndrome   • AAA (abdominal aortic aneurysm) without rupture (HCC)   • Medicare annual wellness visit, subsequent   • Screening for prostate cancer     Advance Care Planning  Advance Directive is not on file.  ACP discussion was held with the patient during this visit. Patient does not have an advance directive, information provided.          Objective    Vitals:    01/05/22 0800 01/05/22 0821   BP: 153/77 122/88   BP Location: Left arm Left arm   Patient Position: Sitting Sitting   Cuff Size: Adult Large Adult   Pulse: (!) 48    Temp: 97.1 °F (36.2 °C)    TempSrc: Infrared    SpO2: 97%    Weight: 87.1 kg (192 lb)      BMI Readings from Last 1 Encounters:   01/05/22 30.99 kg/m²   BMI is above normal parameters. Recommendations include: exercise counseling    Does the patient have evidence of cognitive impairment? No    Physical Exam  Vitals and nursing note reviewed.   Constitutional:       General: He is not in acute distress.     Appearance: He is well-developed.   HENT:      Head: Normocephalic.   Eyes:      General: Lids are normal.      Conjunctiva/sclera: Conjunctivae normal.   Neck:      Thyroid: No thyroid mass or thyromegaly.      Trachea: Trachea normal.   Cardiovascular:      Rate and Rhythm: Normal rate and regular rhythm.      Heart sounds: Normal heart sounds.   Pulmonary:      Effort: Pulmonary effort is normal.      Breath sounds: Normal breath sounds.   Abdominal:      Palpations: Abdomen is soft.   Musculoskeletal:      Cervical back: Normal range of motion.   Lymphadenopathy:      Cervical: No cervical adenopathy.   Skin:     General: Skin is warm and dry.   Neurological:      Mental Status: He is alert and oriented to person, place, and time.   Psychiatric:         Attention and Perception: He is attentive.         Mood and Affect: Mood normal.          Speech: Speech normal.         Behavior: Behavior normal.       Lab Results   Component Value Date    TRIG 81 2022    HDL 59 2022     (H) 2022    VLDL 15 2022            HEALTH RISK ASSESSMENT    Smoking Status:  Social History     Tobacco Use   Smoking Status Former Smoker   • Packs/day: 0.50   • Years: 30.00   • Pack years: 15.00   • Types: Cigarettes   • Start date: 1966   • Quit date: 1995   • Years since quittin.0   Smokeless Tobacco Never Used   Tobacco Comment    Stopped in mid 90s     Alcohol Consumption:  Social History     Substance and Sexual Activity   Alcohol Use Yes   • Alcohol/week: 2.0 standard drinks   • Types: 2 Cans of beer per week    Comment: light drinker when socializing     Fall Risk Screen:    JESSE Fall Risk Assessment was completed, and patient is at LOW risk for falls.Assessment completed on:2022    Depression Screening:  PHQ-2/PHQ-9 Depression Screening 2022   Little interest or pleasure in doing things 0   Feeling down, depressed, or hopeless 0   Total Score 0       Health Habits and Functional and Cognitive Screening:  Functional & Cognitive Status 2022   Do you have difficulty preparing food and eating? No   Do you have difficulty bathing yourself, getting dressed or grooming yourself? No   Do you have difficulty using the toilet? No   Do you have difficulty moving around from place to place? No   Do you have trouble with steps or getting out of a bed or a chair? No   Current Diet Well Balanced Diet   Dental Exam Up to date   Eye Exam Up to date   Exercise (times per week) 5 times per week   Current Exercises Include Cardiovascular Workout   Current Exercise Activities Include -   Do you need help using the phone?  No   Are you deaf or do you have serious difficulty hearing?  No   Do you need help with transportation? No   Do you need help shopping? No   Do you need help preparing meals?  No   Do you need help with housework?   No   Do you need help with laundry? No   Do you need help taking your medications? No   Do you ever drive or ride in a car without wearing a seat belt? No   Have you felt unusual stress, anger or loneliness in the last month? No   Who do you live with? Spouse   If you need help, do you have trouble finding someone available to you? No   Do you have difficulty concentrating, remembering or making decisions? No       Age-appropriate Screening Schedule:  Refer to the list below for future screening recommendations based on patient's age, sex and/or medical conditions. Orders for these recommended tests are listed in the plan section. The patient has been provided with a written plan.    Health Maintenance   Topic Date Due   • TDAP/TD VACCINES (1 - Tdap) Never done   • LIPID PANEL  01/05/2023   • INFLUENZA VACCINE  Completed   • ZOSTER VACCINE  Completed              Assessment/Plan   CMS Preventative Services Quick Reference  Risk Factors Identified During Encounter  Obesity/Overweight   The above risks/problems have been discussed with the patient.  Follow up actions/plans if indicated are seen below in the Assessment/Plan Section.  Pertinent information has been shared with the patient in the After Visit Summary.    Diagnoses and all orders for this visit:    1. Medicare annual wellness visit, subsequent (Primary)  -     Comprehensive Metabolic Panel  -     Lipid Panel    2. Screening for prostate cancer  -     PSA Screen        Follow Up:   Return in about 1 year (around 1/5/2023) for Medicare Wellness.     An After Visit Summary and PPPS were made available to the patient.                   Answers for HPI/ROS submitted by the patient on 1/1/2022  What is the primary reason for your visit?: Other  Please describe your symptoms.: Yearly visit (last visit 2020) and blood tests to include diabetes blood work. I have been close to margin in past for diabetes and I am not taking cholesterol medicine more. Taking CoCQ10  instead as advised by my cardiologist., , I have had all 3 Covid shots and to my knowledge have been covid free.  Have you had these symptoms before?: Yes  How long have you been having these symptoms?: Greater than 2 weeks  Please list any medications you are currently taking for this condition.: None  Please describe any probable cause for these symptoms. : No known symptoms. But have always had blood work as part of my yearly visits in the past.

## 2022-01-05 NOTE — PATIENT INSTRUCTIONS
Medicare Wellness  Personal Prevention Plan of Service     Date of Office Visit:  2022  Encounter Provider:  Yvonne Santa MD  Place of Service:  [facility]  Patient Name: Giovani PLATT:  1950    As part of the Medicare Wellness portion of your visit today, we are providing you with this personalized preventive plan of services (PPPS). This plan is based upon recommendations of the [place] Preventive Services Task Force (USPSTF) and the Advisory Committee on Immunization Practices (ACIP).    This lists the preventive care services that should be considered, and provides dates of when you are due. Items listed as completed are up-to-date and do not require any further intervention.    Health Maintenance   Topic Date Due   • TDAP/TD VACCINES (1 - Tdap) Never done   • Pneumococcal Vaccine 65+ (1 of 1 - PPSV23) Never done   • LIPID PANEL  2021   • ANNUAL WELLNESS VISIT  2023   • COLORECTAL CANCER SCREENING  2028   • HEPATITIS C SCREENING  Completed   • COVID-19 Vaccine  Completed   • INFLUENZA VACCINE  Completed   • AAA SCREEN (ONE-TIME)  Completed   • ZOSTER VACCINE  Completed       Orders Placed This Encounter   Procedures   • Comprehensive Metabolic Panel     Order Specific Question:   Release to patient     Answer:   Immediate   • Lipid Panel   • PSA Screen     Order Specific Question:   Release to patient     Answer:   Immediate       Return in about 1 year (around 2023) for Medicare Wellness.

## 2022-01-22 PROBLEM — Z12.5 SCREENING FOR PROSTATE CANCER: Status: ACTIVE | Noted: 2022-01-22

## 2022-08-01 ENCOUNTER — OFFICE VISIT (OUTPATIENT)
Dept: CARDIOLOGY | Facility: CLINIC | Age: 72
End: 2022-08-01

## 2022-08-01 VITALS
WEIGHT: 198 LBS | DIASTOLIC BLOOD PRESSURE: 84 MMHG | HEART RATE: 53 BPM | SYSTOLIC BLOOD PRESSURE: 130 MMHG | BODY MASS INDEX: 31.08 KG/M2 | RESPIRATION RATE: 18 BRPM | HEIGHT: 67 IN

## 2022-08-01 DIAGNOSIS — Z13.9 SCREENING DUE: Primary | ICD-10-CM

## 2022-08-01 PROCEDURE — 93000 ELECTROCARDIOGRAM COMPLETE: CPT | Performed by: INTERNAL MEDICINE

## 2022-08-01 PROCEDURE — 99214 OFFICE O/P EST MOD 30 MIN: CPT | Performed by: INTERNAL MEDICINE

## 2022-08-02 NOTE — PROGRESS NOTES
"Cardiology Clinic Note  Brandon Brarios MD, PhD    Subjective:     Encounter Date:08/01/2022      Patient ID: Giovani Moseley is a 72 y.o. male.    Chief Complaint:  Chief Complaint   Patient presents with   • Establish Care       HPI:    At the pleasure to see this 72-year-old gentleman as a new patient to me.  Blood pressure 130/84 heart rates in the 50s.  He has a history of hyperlipidemia and obstructive sleep apnea.  He had an echo in 2019 which demonstrated normal EF.  He has a history of AAA without rupture mixed hyperlipidemia prediabetes per his EMR that I reviewed.  He was under evaluation for carotid aortic renal disease, he was only found to have mild plaquing in the carotid arteries in April of last year, he has been on low-dose Crestor 10 mg daily, his last LDL was below 130 with HDL greater than 60, aortic enlargement was apparently mild and has not been investigated further echo.  He has no symptoms of abdominal pain presyncope palpitations heart failure or anginal chest pain at this time.  He quit smoking remotely 1995.  He is not diabetic and he is not hypertensive today    Review of systems otherwise negative x14 point review of systems except was mentioned above    Historical data copied forward from previous encounters in EMR including the history, exam, and assessment/plan has been reviewed and is unchanged unless noted otherwise.    Cardiac medicines reviewed with risk, benefits, and necessity of each discussed.    Risk and benefit of cardiac testing reviewed including death heart attack stroke pain bleeding infection need for vascular /cardiovascular surgery were discussed and the patient     Objective:         /84 (BP Location: Left arm, Patient Position: Sitting)   Pulse 53   Resp 18   Ht 170.2 cm (67\")   Wt 89.8 kg (198 lb)   BMI 31.01 kg/m²     Physical Exam  Regular rate and rhythm no rubs gallops heave or lift  No new murmurs  No clubbing cyanosis or edema  Normal pulses normal " cap refill  Intact grossly  Clear to auscultation  Assessment:         Medical history in EMR remarks about AAA without rupture, no abdominal pain, repeat abdominal ultrasound in 1 to 2 years with smoking history    No evidence of uncontrolled blood pressure today  Lipids are controlled on low-dose rosuvastatin, co-Q10, no aspirin on board  No history of coronary disease  No anginal  No heart failure signs or symptoms    See him back in 1 year to CV clinic, primary care in the interim    Heart healthy diet, exercise per AHA guidelines  Non-smoker presently, continue abstinence          The pleasure to be involved in this patient's cardiovascular care.  Please call with any questions or concerns  Brandon Barrios MD, PhD    Most recent EKG as reviewed and interpreted by me:    ECG 12 Lead    Date/Time: 8/1/2022 5:04 PM  Performed by: Brandon Barrios MD  Authorized by: Brandon Barrios MD   Comparison: not compared with previous ECG   Rhythm: sinus rhythm  Rate: normal  QRS axis: left  Other findings: non-specific ST-T wave changes    Clinical impression: abnormal EKG             Most recent echo as reviewed and interpreted by me:  Results for orders placed during the hospital encounter of 11/13/19    Adult Transthoracic Echo Complete W/ Cont if Necessary Per Protocol    Interpretation Summary  · Left ventricular systolic function is normal. Calculated EF = 69.0%. Estimated EF was in agreement with the calculated EF. Normal left ventricular cavity size noted. Left ventricular wall thickness is consistent with mild concentric hypertrophy. Left ventricular diastolic function is normal.  · The valve appears trileaflet. The aortic valve is abnormal in structure. There is mild calcification of the aortic valve mainly affecting the non, left and right coronary cusp(s).Trace-to-mild aortic valve regurgitation is present. No aortic valve stenosis is present.  · The mitral valve is grossly normal in structure.  Mild mitral valve regurgitation is present.  · The tricuspid valve is grossly normal. Mild tricuspid valve regurgitation is present. Estimated right ventricular systolic pressure from tricuspid regurgitation is normal (<35 mmHg).      Most recent stress test as reviewed and interpreted by me:      Most recent cardiac catheterization as reviewed interpreted by me:  No results found for this or any previous visit.    The following portions of the patient's history were reviewed and updated as appropriate: allergies, current medications, past family history, past medical history, past social history, past surgical history and problem list.      ROS:  14 point review of systems negative except as mentioned above    Current Outpatient Medications:   •  doxepin (SINEquan) 10 MG capsule, Take 1 capsule by mouth Every Night., Disp: 30 capsule, Rfl: 11  •  silodosin (RAPAFLO) 8 MG capsule capsule, TAKE ONE (1) CAPSULE BY MOUTH EVERY DAY, Disp: , Rfl:   •  vitamin C (ASCORBIC ACID) 250 MG tablet, Take 250 mg by mouth Daily., Disp: , Rfl:     Problem List:  Patient Active Problem List   Diagnosis   • Hyperlipidemia   • Benign localized hyperplasia of prostate   • Obstructive sleep apnea syndrome   • Metabolic syndrome   • AAA (abdominal aortic aneurysm) without rupture (HCC)   • Medicare annual wellness visit, subsequent   • Screening for prostate cancer     Past Medical History:  Past Medical History:   Diagnosis Date   • AAA (abdominal aortic aneurysm) without rupture (HCC) 3/31/2021   • Abnormal ECG 01/18/2019    I believe past submitted records reflect such   • Benign prostate hyperplasia    • Cataract    • Congenital heart 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    S/A   • Coronary artery 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    S/A   • Hyperlipidemia    • Metabolic syndrome 3/31/2021   • Sleep apnea 01/31/08    conflicting opinions from different specialists in 2008 and     Past Surgical History:  Past Surgical History:   Procedure Laterality Date   • EYE SURGERY  Bilateral 10/3 and 10/15   • TONSILLECTOMY       Social History:  Social History     Socioeconomic History   • Marital status:    Tobacco Use   • Smoking status: Former Smoker     Packs/day: 0.50     Years: 30.00     Pack years: 15.00     Types: Cigarettes     Start date: 1966     Quit date: 1995     Years since quittin.6   • Smokeless tobacco: Never Used   • Tobacco comment: Stopped in mid 90s   Substance and Sexual Activity   • Alcohol use: Yes     Alcohol/week: 2.0 standard drinks     Types: 2 Cans of beer per week     Comment: light drinker when socializing   • Drug use: No   • Sexual activity: Not Currently     Allergies:  No Known Allergies  Immunizations:  Immunization History   Administered Date(s) Administered   • COVID-19 (PFIZER) PURPLE CAP 2021, 2021, 2021   • Flu Vaccine Quad PF >36MO 2020   • Fluad Quad 65+ 2020, 10/06/2021   • Fluzone High Dose =>65 Years ([employer] ONLY) 10/08/2018, 2019   • Influenza, Unspecified 2021   • Shingrix 10/18/2019, 2019            In-Office Procedure(s):  No orders to display        ASCVD RIsk Score::  The 10-year ASCVD risk score (Julia NATALIO Jr., et al., 2013) is: 18.4%    Values used to calculate the score:      Age: 72 years      Sex: Male      Is Non- : No      Diabetic: No      Tobacco smoker: No      Systolic Blood Pressure: 130 mmHg      Is BP treated: No      HDL Cholesterol: 59 mg/dL      Total Cholesterol: 198 mg/dL    Imaging:         Results for orders placed during the hospital encounter of 21    US Renal Bilateral    Narrative  DATE OF EXAM:  2021 9:56 AM    PROCEDURE:  US RENAL BILATERAL-    INDICATIONS:  renal insufficiency; N28.9-Disorder of kidney and ureter, unspecified    COMPARISON:  No Comparisons Available    TECHNIQUE:  Grayscale and color Doppler ultrasound evaluation of the kidneys and  urinary bladder was performed.      FINDINGS:  The right kidney  measures 11.9 x 6.6 x 7.0 cm. The left kidney measures  11.4 x 5.7 x 5.1 cm. Both kidneys maintain normal cortical thickness and  cortical echotexture. No cystic or solid renal mass, shadowing stone or  hydronephrosis is seen. The urinary bladder is decompressed and not  visualized.    Impression  Normal bilateral renal ultrasound.    Electronically Signed By-Yoana Interiano MD On:4/27/2021 10:34 AM  This report was finalized on [id number] by  Yoana Interiano MD.          Lab Review:   No visits with results within 6 Month(s) from this visit.   Latest known visit with results is:   Office Visit on 01/05/2022   Component Date Value   • Glucose 01/05/2022 97    • BUN 01/05/2022 12    • Creatinine 01/05/2022 0.98    • Sodium 01/05/2022 139    • Potassium 01/05/2022 4.4    • Chloride 01/05/2022 103    • CO2 01/05/2022 29.8 (A)   • Calcium 01/05/2022 9.5    • Total Protein 01/05/2022 6.6    • Albumin 01/05/2022 4.70    • ALT (SGPT) 01/05/2022 19    • AST (SGOT) 01/05/2022 19    • Alkaline Phosphatase 01/05/2022 46    • Total Bilirubin 01/05/2022 1.9 (A)   • eGFR Non  Amer 01/05/2022 76    • Globulin 01/05/2022 1.9    • A/G Ratio 01/05/2022 2.5    • BUN/Creatinine Ratio 01/05/2022 12.2    • Anion Gap 01/05/2022 6.2    • Total Cholesterol 01/05/2022 198    • Triglycerides 01/05/2022 81    • HDL Cholesterol 01/05/2022 59    • LDL Cholesterol  01/05/2022 124 (A)   • VLDL Cholesterol 01/05/2022 15    • LDL/HDL Ratio 01/05/2022 2.08    • PSA 01/05/2022 3.010      Recent labs reviewed and interpreted for clinical significance and application            Level of Care:           Brandon Barrios MD  08/02/22  .

## 2022-10-13 ENCOUNTER — TELEPHONE (OUTPATIENT)
Dept: FAMILY MEDICINE CLINIC | Facility: CLINIC | Age: 72
End: 2022-10-13

## 2022-10-13 NOTE — TELEPHONE ENCOUNTER
Patient came in asking to receive a Tdap vaccine because he has never had one. He said he got a notification thru My Chart saying he was due. I explained to the patient Medicare will not cover a TDAp vaccine but they cover TD when there is a injury, I did tell patient I would check with you and make sure he does not need the vaccine. Please advise.

## 2022-10-14 NOTE — TELEPHONE ENCOUNTER
I recommend that he go to the health department or a pharmacy to get this since Medicare does not usually cover this here.

## 2022-11-04 ENCOUNTER — TELEPHONE (OUTPATIENT)
Dept: NEUROLOGY | Facility: CLINIC | Age: 72
End: 2022-11-04

## 2022-11-04 DIAGNOSIS — G47.33 OBSTRUCTIVE SLEEP APNEA: Primary | ICD-10-CM

## 2022-12-28 NOTE — PROGRESS NOTES
Chief Complaint  Sleep Apnea    Subjective          Giovani Moseley presents to [facility]  History of Present Illness    MIREYA, yearly f/u for CPAP compliance, patient doing well with pap therapy.      He takes doxepin to help him sleep at night. He falls asleep fine, but does have to get up to urinate, and finds it hard to get back to sleep.     He does not use it every night.     If he wakes up and urinates he takes nyquil to help him sleep.     He uses a [facility] and goes through [employer] in [place] for supplies.  He has received his new CPAP      Sleep testing history:    First diagnosed in 2008 with ahi of 18  Repeat npsg in 2016 with ahi 2.4 and rdi 20  PAP download:  The patient is on CPAP therapy at 4-20 cm/H2O.   Data indicates Excellent compliance. With 76% usage for more than 4 hours with an average usage of 5 hours 52 minutes. AHI down to 2.8 .  Average pressures 4.8.  Average large leak 16sec.     The patient's hypersomnia has resolved       [place] Sleepiness Scale:  Sitting and reading 1 WatchingTV 0  Sitting, inactive, in a public place 0  As a passenger in a car for 1 hour w/o a break  0  Lying down to rest in the afternoon  1  Sitting and talking to someone  0  Sitting quietly after a lunch  0  In a car, while stopped for traffic or a light  0  Total 1    Review of Systems   Constitutional: Negative.    HENT: Negative.    Eyes: Positive for discharge and redness.   Respiratory: Positive for apnea. Negative for cough.    Cardiovascular: Negative.    Gastrointestinal: Negative.    Genitourinary: Negative.    Musculoskeletal: Negative.    Skin: Negative.    Allergic/Immunologic: Negative.    Neurological: Negative.    Hematological: Negative.    Psychiatric/Behavioral: Negative.      Objective   Vital Signs:   Temp 97.6 °F (36.4 °C)   Ht 170.2 cm (67\")   Wt 91.2 kg (201 lb)   BMI 31.48 kg/m²     Physical Exam  Vitals reviewed.   HENT:      Mouth/Throat:      Mouth: Mucous  membranes are moist.   Cardiovascular:      Rate and Rhythm: Normal rate.   Pulmonary:      Effort: Pulmonary effort is normal.   Neurological:      General: No focal deficit present.      Mental Status: He is alert.   Psychiatric:         Mood and Affect: Mood normal.        Result Review :                 Assessment and Plan    Diagnoses and all orders for this visit:    1. Obstructive sleep apnea syndrome (Primary)  -     doxepin (SINEquan) 10 MG capsule; Take 1 capsule by mouth Every Night.  Dispense: 30 capsule; Refill: 11    2. Insomnia, unspecified type      Continue cpap , increase pressure to min 5max 10    The patient is compliant with and benefiting from PAP therapy.    Continue doxepin for the insomnia      Follow Up   Return in about 1 year (around 1/3/2024).    Patient was given instructions and counseling regarding his condition or for health maintenance advice. Please see specific information pulled into the AVS if appropriate.         This document has been electronically signed by Joseph Seipel, MD on January 3, 2023 10:53 EST

## 2023-01-03 ENCOUNTER — OFFICE VISIT (OUTPATIENT)
Dept: NEUROLOGY | Facility: CLINIC | Age: 73
End: 2023-01-03
Payer: MEDICARE

## 2023-01-03 VITALS — TEMPERATURE: 97.6 F | WEIGHT: 201 LBS | HEIGHT: 67 IN | BODY MASS INDEX: 31.55 KG/M2

## 2023-01-03 DIAGNOSIS — G47.33 OBSTRUCTIVE SLEEP APNEA SYNDROME: Primary | ICD-10-CM

## 2023-01-03 DIAGNOSIS — G47.00 INSOMNIA, UNSPECIFIED TYPE: ICD-10-CM

## 2023-01-03 PROCEDURE — 1160F RVW MEDS BY RX/DR IN RCRD: CPT | Performed by: PSYCHIATRY & NEUROLOGY

## 2023-01-03 PROCEDURE — 1159F MED LIST DOCD IN RCRD: CPT | Performed by: PSYCHIATRY & NEUROLOGY

## 2023-01-03 PROCEDURE — 99214 OFFICE O/P EST MOD 30 MIN: CPT | Performed by: PSYCHIATRY & NEUROLOGY

## 2023-01-03 RX ORDER — DOXEPIN HYDROCHLORIDE 10 MG/1
10 CAPSULE ORAL NIGHTLY
Qty: 30 CAPSULE | Refills: 11 | Status: SHIPPED | OUTPATIENT
Start: 2023-01-03

## 2023-01-03 RX ORDER — VITAMIN B COMPLEX
TABLET ORAL
COMMUNITY
Start: 2021-10-06

## 2023-01-03 NOTE — LETTER
January 3, 2023     Yvonne Santa MD  [address]  [address]  [place] [address]    Patient: Giovani Moseley   YOB: 1950   Date of Visit: 1/3/2023       Dear Dr. Arina MD:    Thank you for referring Giovani Moseley to me for evaluation. Below are the relevant portions of my assessment and plan of care.    If you have questions, please do not hesitate to call me. I look forward to following Giovani along with you.         Sincerely,        Joseph F Seipel, MD        CC: No Recipients  Seipel, Joseph F, MD  01/03/23 1054  Signed  Chief Complaint  Sleep Apnea    Subjective           Giovani Moseley presents to [facility] NEUROLOGY  History of Present Illness    MIREYA, yearly f/u for CPAP compliance, patient doing well with pap therapy.      He takes doxepin to help him sleep at night. He falls asleep fine, but does have to get up to urinate, and finds it hard to get back to sleep.     He does not use it every night.     If he wakes up and urinates he takes nyquil to help him sleep.     He uses a [facility] and [facility] [place] [facility].  He has received his new CPAP      Sleep testing history:    First diagnosed in 2008 with ahi of 18  Repeat npsg in 2016 with ahi 2.4 and rdi 20  PAP download:  The patient is on CPAP therapy at 4-20 cm/H2O.   Data indicates Excellent compliance. With 76% usage for more than 4 hours with an average usage of 5 hours 52 minutes. AHI down to 2.8 .  Average pressures 4.8.  Average large leak 16sec.     The patient's hypersomnia has resolved       [place] Sleepiness Scale:  Sitting and reading 1 WatchingTV 0  Sitting, inactive, in a public place 0  As a passenger in a car for 1 hour w/o a break  0  Lying down to rest in the afternoon  1  Sitting and talking to someone  0  Sitting quietly after a lunch  0  In a car, while stopped for traffic or a light  0  Total 1    Review of Systems   Constitutional: Negative.    HENT: Negative.    Eyes: Positive for  discharge and redness.   Respiratory: Positive for apnea. Negative for cough.    Cardiovascular: Negative.    Gastrointestinal: Negative.    Genitourinary: Negative.    Musculoskeletal: Negative.    Skin: Negative.    Allergic/Immunologic: Negative.    Neurological: Negative.    Hematological: Negative.    Psychiatric/Behavioral: Negative.      Objective    Vital Signs:   Temp 97.6 °F (36.4 °C)   Ht 170.2 cm (67\")   Wt 91.2 kg (201 lb)   BMI 31.48 kg/m²     Physical Exam  Vitals reviewed.   HENT:      Mouth/Throat:      Mouth: Mucous membranes are moist.   Cardiovascular:      Rate and Rhythm: Normal rate.   Pulmonary:      Effort: Pulmonary effort is normal.   Neurological:      General: No focal deficit present.      Mental Status: He is alert.   Psychiatric:         Mood and Affect: Mood normal.        Result Review :                Assessment and Plan    Diagnoses and all orders for this visit:    1. Obstructive sleep apnea syndrome (Primary)  -     doxepin (SINEquan) 10 MG capsule; Take 1 capsule by mouth Every Night.  Dispense: 30 capsule; Refill: 11    2. Insomnia, unspecified type      Continue cpap , increase pressure to min 5max 10    The patient is compliant with and benefiting from PAP therapy.    Continue doxepin for the insomnia      Follow Up   Return in about 1 year (around 1/3/2024).    Patient was given instructions and counseling regarding his condition or for health maintenance advice. Please see specific information pulled into the AVS if appropriate.         This document has been electronically signed by Joseph Seipel, MD on January 3, 2023 10:53 EST

## 2023-01-06 ENCOUNTER — OFFICE VISIT (OUTPATIENT)
Dept: FAMILY MEDICINE CLINIC | Facility: CLINIC | Age: 73
End: 2023-01-06
Payer: MEDICARE

## 2023-01-06 ENCOUNTER — LAB (OUTPATIENT)
Dept: FAMILY MEDICINE CLINIC | Facility: CLINIC | Age: 73
End: 2023-01-06
Payer: MEDICARE

## 2023-01-06 VITALS
OXYGEN SATURATION: 95 % | RESPIRATION RATE: 16 BRPM | WEIGHT: 202.2 LBS | HEART RATE: 52 BPM | DIASTOLIC BLOOD PRESSURE: 86 MMHG | BODY MASS INDEX: 31.74 KG/M2 | HEIGHT: 67 IN | TEMPERATURE: 97.1 F | SYSTOLIC BLOOD PRESSURE: 139 MMHG

## 2023-01-06 DIAGNOSIS — Z83.3 FAMILY HISTORY OF DIABETES MELLITUS: ICD-10-CM

## 2023-01-06 DIAGNOSIS — E78.2 MIXED HYPERLIPIDEMIA: ICD-10-CM

## 2023-01-06 DIAGNOSIS — Z12.5 SCREENING FOR PROSTATE CANCER: ICD-10-CM

## 2023-01-06 DIAGNOSIS — R73.9 HYPERGLYCEMIA: ICD-10-CM

## 2023-01-06 DIAGNOSIS — Z00.00 MEDICARE ANNUAL WELLNESS VISIT, SUBSEQUENT: Primary | ICD-10-CM

## 2023-01-06 LAB
ALBUMIN SERPL-MCNC: 4.6 G/DL (ref 3.5–5.2)
ALBUMIN/GLOB SERPL: 2.7 G/DL
ALP SERPL-CCNC: 43 U/L (ref 39–117)
ALT SERPL W P-5'-P-CCNC: 17 U/L (ref 1–41)
ANION GAP SERPL CALCULATED.3IONS-SCNC: 10.2 MMOL/L (ref 5–15)
AST SERPL-CCNC: 17 U/L (ref 1–40)
BILIRUB SERPL-MCNC: 1.4 MG/DL (ref 0–1.2)
BUN SERPL-MCNC: 13 MG/DL (ref 8–23)
BUN/CREAT SERPL: 13.7 (ref 7–25)
CALCIUM SPEC-SCNC: 9.6 MG/DL (ref 8.6–10.5)
CHLORIDE SERPL-SCNC: 103 MMOL/L (ref 98–107)
CHOLEST SERPL-MCNC: 197 MG/DL (ref 0–200)
CO2 SERPL-SCNC: 26.8 MMOL/L (ref 22–29)
CREAT SERPL-MCNC: 0.95 MG/DL (ref 0.76–1.27)
EGFRCR SERPLBLD CKD-EPI 2021: 85.6 ML/MIN/1.73
GLOBULIN UR ELPH-MCNC: 1.7 GM/DL
GLUCOSE SERPL-MCNC: 105 MG/DL (ref 65–99)
HBA1C MFR BLD: 5.3 % (ref 3.5–5.6)
HDLC SERPL-MCNC: 53 MG/DL (ref 40–60)
LDLC SERPL CALC-MCNC: 124 MG/DL (ref 0–100)
LDLC/HDLC SERPL: 2.3 {RATIO}
POTASSIUM SERPL-SCNC: 4.5 MMOL/L (ref 3.5–5.2)
PROT SERPL-MCNC: 6.3 G/DL (ref 6–8.5)
PSA SERPL-MCNC: 3.58 NG/ML (ref 0–4)
SODIUM SERPL-SCNC: 140 MMOL/L (ref 136–145)
TRIGL SERPL-MCNC: 110 MG/DL (ref 0–150)
VLDLC SERPL-MCNC: 20 MG/DL (ref 5–40)

## 2023-01-06 PROCEDURE — G0103 PSA SCREENING: HCPCS | Performed by: FAMILY MEDICINE

## 2023-01-06 PROCEDURE — 80061 LIPID PANEL: CPT | Performed by: FAMILY MEDICINE

## 2023-01-06 PROCEDURE — G0439 PPPS, SUBSEQ VISIT: HCPCS | Performed by: FAMILY MEDICINE

## 2023-01-06 PROCEDURE — 1126F AMNT PAIN NOTED NONE PRSNT: CPT | Performed by: FAMILY MEDICINE

## 2023-01-06 PROCEDURE — 1125F AMNT PAIN NOTED PAIN PRSNT: CPT | Performed by: FAMILY MEDICINE

## 2023-01-06 PROCEDURE — 36415 COLL VENOUS BLD VENIPUNCTURE: CPT | Performed by: FAMILY MEDICINE

## 2023-01-06 PROCEDURE — 83036 HEMOGLOBIN GLYCOSYLATED A1C: CPT | Performed by: FAMILY MEDICINE

## 2023-01-06 PROCEDURE — 1159F MED LIST DOCD IN RCRD: CPT | Performed by: FAMILY MEDICINE

## 2023-01-06 PROCEDURE — 80053 COMPREHEN METABOLIC PANEL: CPT | Performed by: FAMILY MEDICINE

## 2023-01-06 PROCEDURE — 1170F FXNL STATUS ASSESSED: CPT | Performed by: FAMILY MEDICINE

## 2023-01-06 PROCEDURE — 1160F RVW MEDS BY RX/DR IN RCRD: CPT | Performed by: FAMILY MEDICINE

## 2023-01-06 NOTE — PATIENT INSTRUCTIONS
Medicare Wellness  Personal Prevention Plan of Service     Date of Office Visit:    Encounter Provider:  Yvonne Santa MD  Place of Service:  [facility] FAMILY [facility]  Patient Name: Giovani Moseley  :  1950    As part of the Medicare Wellness portion of your visit today, we are providing you with this personalized preventive plan of services (PPPS). This plan is based upon recommendations of the [place] Preventive Services Task Force (USPSTF) and the Advisory Committee on Immunization Practices (ACIP).    This lists the preventive care services that should be considered, and provides dates of when you are due. Items listed as completed are up-to-date and do not require any further intervention.    Health Maintenance   Topic Date Due    Pneumococcal Vaccine 65+ (1 - PCV) Never done    COVID-19 Vaccine (4 - Booster for Pfizer series) 2021    LIPID PANEL  2023    ANNUAL WELLNESS VISIT  2024    COLORECTAL CANCER SCREENING  2028    TDAP/TD VACCINES (2 - Td or Tdap) 10/06/2032    HEPATITIS C SCREENING  Completed    INFLUENZA VACCINE  Completed    AAA SCREEN (ONE-TIME)  Completed    ZOSTER VACCINE  Completed       Orders Placed This Encounter   Procedures    PSA Screen     Order Specific Question:   Release to patient     Answer:   Routine Release    Lipid Panel    Comprehensive Metabolic Panel     Order Specific Question:   Release to patient     Answer:   Routine Release    Hemoglobin A1c     Order Specific Question:   Release to patient     Answer:   Routine Release       Return in about 1 year (around 2024) for Medicare Wellness.

## 2023-01-06 NOTE — PROGRESS NOTES
The ABCs of the Annual Wellness Visit  Subsequent Medicare Wellness Visit    Subjective      Giovani Moseley is a 73 y.o. male who presents for a Subsequent Medicare Wellness Visit.    The following portions of the patient's history were reviewed and   updated as appropriate: allergies, current medications, past family history, past medical history, past social history, past surgical history and problem list.    Compared to one year ago, the patient feels his physical   health is the same.    Compared to one year ago, the patient feels his mental   health is the same.    Recent Hospitalizations:  He was not admitted to the hospital during the last year.       Current Medical Providers:  Patient Care Team:  Yvonne Santa MD as PCP - General (Family Medicine)  Patrick Crane MD as Consulting Physician (Urology)  Seipel, Joseph F, MD as Consulting Physician (Sleep Medicine)  Hollis Redd MD as Consulting Physician (Cardiology)  Alexey Stuart MD as Consulting Physician (Ophthalmology)    Outpatient Medications Prior to Visit   Medication Sig Dispense Refill   • Coenzyme Q10 (CoQ10) 100 MG capsule      • doxepin (SINEquan) 10 MG capsule Take 1 capsule by mouth Every Night. 30 capsule 11   • silodosin (RAPAFLO) 8 MG capsule capsule TAKE ONE (1) CAPSULE BY MOUTH EVERY DAY     • vitamin C (ASCORBIC ACID) 250 MG tablet Take 250 mg by mouth Daily.     • ciclopirox (PENLAC) 8 % solution PAINT A THIN LAYER ON AFFECTED NAILS EVERY DAY, WASH OFF WITH ALCOHOL EVERY 7 DAYS       No facility-administered medications prior to visit.       No opioid medication identified on active medication list. I have reviewed chart for other potential  high risk medication/s and harmful drug interactions in the elderly.          Aspirin is not on active medication list.  Aspirin use is not indicated based on review of current medical condition/s. Risk of harm outweighs potential benefits.  .    Patient Active Problem  "List   Diagnosis   • Hyperlipidemia   • Benign localized hyperplasia of prostate   • Obstructive sleep apnea syndrome   • Metabolic syndrome   • AAA (abdominal aortic aneurysm) without rupture   • Medicare annual wellness visit, subsequent   • Screening for prostate cancer     Advance Care Planning  Advance Directive is not on file.  ACP discussion was held with the patient during this visit. Patient does not have an advance directive, information provided.     Objective    Vitals:    01/06/23 0823 01/06/23 0831   BP: 145/86 139/86   BP Location: Left arm Left arm   Patient Position: Sitting    Cuff Size: Adult    Pulse: 52    Resp: 16    Temp: 97.1 °F (36.2 °C)    TempSrc: Temporal    SpO2: 95%    Weight: 91.7 kg (202 lb 3.2 oz)    Height: 170.2 cm (67.01\")      Estimated body mass index is 31.66 kg/m² as calculated from the following:    Height as of this encounter: 170.2 cm (67.01\").    Weight as of this encounter: 91.7 kg (202 lb 3.2 oz).    BMI is >= 30 and <35. (Class 1 Obesity). The following options were offered after discussion;: exercise counseling/recommendations  Physical Exam  Vitals and nursing note reviewed.   Constitutional:       General: He is not in acute distress.     Appearance: He is well-developed.   HENT:      Head: Normocephalic.      Right Ear: Hearing, tympanic membrane and ear canal normal.      Left Ear: Hearing, tympanic membrane and ear canal normal.   Eyes:      General: Lids are normal.      Conjunctiva/sclera: Conjunctivae normal.      Pupils: Pupils are equal, round, and reactive to light.   Neck:      Thyroid: No thyroid mass or thyromegaly.   Cardiovascular:      Rate and Rhythm: Normal rate and regular rhythm.   Pulmonary:      Effort: Pulmonary effort is normal.      Breath sounds: Normal breath sounds.   Abdominal:      General: Bowel sounds are normal.      Palpations: Abdomen is soft.      Tenderness: There is no abdominal tenderness.   Musculoskeletal:         General: " Normal range of motion.      Cervical back: Normal range of motion and neck supple.   Lymphadenopathy:      Cervical: No cervical adenopathy.   Skin:     General: Skin is warm and dry.   Neurological:      General: No focal deficit present.      Mental Status: He is alert and oriented to person, place, and time.   Psychiatric:         Mood and Affect: Mood normal.         Speech: Speech normal.           Does the patient have evidence of cognitive impairment?   No    Lab Results   Component Value Date    TRIG 110 2023    HDL 53 2023     (H) 2023    VLDL 20 2023    HGBA1C 5.3 2023          HEALTH RISK ASSESSMENT    Smoking Status:  Social History     Tobacco Use   Smoking Status Former   • Packs/day: 0.50   • Years: 30.00   • Pack years: 15.00   • Types: Cigarettes   • Start date: 1966   • Quit date: 1995   • Years since quittin.0   Smokeless Tobacco Never   Tobacco Comments    Stopped in mid 90s     Alcohol Consumption:  Social History     Substance and Sexual Activity   Alcohol Use Yes   • Alcohol/week: 2.0 standard drinks   • Types: 2 Cans of beer per week    Comment: light drinker when socializing     Fall Risk Screen:    JESSE Fall Risk Assessment was completed, and patient is at LOW risk for falls.Assessment completed on:1/3/2023    Depression Screening:  PHQ-2/PHQ-9 Depression Screening 2023   Little Interest or Pleasure in Doing Things 0-->not at all   Feeling Down, Depressed or Hopeless 0-->not at all   PHQ-9: Brief Depression Severity Measure Score 0       Health Habits and Functional and Cognitive Screening:  Functional & Cognitive Status 2023   Do you have difficulty preparing food and eating? No   Do you have difficulty bathing yourself, getting dressed or grooming yourself? No   Do you have difficulty using the toilet? No   Do you have difficulty moving around from place to place? No   Do you have trouble with steps or getting out of a bed or a  chair? No   Current Diet Well Balanced Diet   Dental Exam Up to date   Eye Exam Up to date   Exercise (times per week) 7 times per week   Current Exercises Include Cardiovascular Workout   Current Exercise Activities Include -   Do you need help using the phone?  No   Are you deaf or do you have serious difficulty hearing?  No   Do you need help with transportation? No   Do you need help shopping? No   Do you need help preparing meals?  No   Do you need help with housework?  No   Do you need help with laundry? No   Do you need help taking your medications? No   Do you ever drive or ride in a car without wearing a seat belt? No   Have you felt unusual stress, anger or loneliness in the last month? No   Who do you live with? Spouse   If you need help, do you have trouble finding someone available to you? No   Do you have difficulty concentrating, remembering or making decisions? No       Age-appropriate Screening Schedule:  Refer to the list below for future screening recommendations based on patient's age, sex and/or medical conditions. Orders for these recommended tests are listed in the plan section. The patient has been provided with a written plan.    Health Maintenance   Topic Date Due   • LIPID PANEL  01/06/2024   • TDAP/TD VACCINES (3 - Td or Tdap) 10/18/2032   • INFLUENZA VACCINE  Completed   • ZOSTER VACCINE  Completed                CMS Preventative Services Quick Reference  Risk Factors Identified During Encounter:    Inactivity/Sedentary: Patient was advised to exercise at least 150 minutes a week per CDC recommendations.  Dental Screening Recommended  Vision Screening Recommended    The above risks/problems have been discussed with the patient.  Pertinent information has been shared with the patient in the After Visit Summary.    Diagnoses and all orders for this visit:    1. Medicare annual wellness visit, subsequent (Primary)    2. Screening for prostate cancer  -     PSA Screen    3. Mixed  hyperlipidemia  -     Lipid Panel  -     Comprehensive Metabolic Panel  -     Hemoglobin A1c    4. Hyperglycemia  -     Hemoglobin A1c    5. Family history of diabetes mellitus  -     Comprehensive Metabolic Panel  -     Hemoglobin A1c        Follow Up:   Next Medicare Wellness visit to be scheduled in 1 year.      An After Visit Summary and PPPS were made available to the patient.

## 2023-01-31 ENCOUNTER — TELEPHONE (OUTPATIENT)
Dept: NEUROLOGY | Facility: CLINIC | Age: 73
End: 2023-01-31

## 2023-01-31 NOTE — TELEPHONE ENCOUNTER
Provider: DR SEIPEL  Caller: PATIENT  Relationship to Patient: SELF  Phone Number: [phone]  Reason for Call: PATIENT SPOKE WITH [facility], THEY ARE GOING TO SEND HIM THE CPAP WITHOUT A PRESCRIPTION. HE STATES THAT THE PROVIDER OR DME SHOULD BE ABLE TO SET THE PRESCRIPTION UP ONCE THE MACHINE ARRIVES.    PLEASE REVIEW AND ADVISE. THANK YOU.

## 2023-01-31 NOTE — TELEPHONE ENCOUNTER
Provider: DR. SEIPEL  Caller: DEVYN TERRELL  Relationship to Patient: SELF  Phone Number: [phone]  Reason for Call: PATIENT CALLED STATING HE SPOKE WITH [facility] AND WAS TOLD THAT HIS C-PAP WAS READY BUT THEY WERE WAITING ON ORDER FROM OFFICE BEFORE MAILING TO PATIENT. PT CALLING TO CONFIRM IF OFFICE HAS SENT ORDER.     PLEASE REVIEW AND ADVISE   THANK YOU

## 2023-02-01 ENCOUNTER — TELEPHONE (OUTPATIENT)
Dept: NEUROLOGY | Facility: CLINIC | Age: 73
End: 2023-02-01
Payer: MEDICARE

## 2023-02-01 DIAGNOSIS — G47.33 OBSTRUCTIVE SLEEP APNEA: Primary | ICD-10-CM

## 2023-06-29 ENCOUNTER — OFFICE (OUTPATIENT)
Dept: URBAN - METROPOLITAN AREA CLINIC 64 | Facility: CLINIC | Age: 73
End: 2023-06-29
Payer: COMMERCIAL

## 2023-06-29 VITALS
HEART RATE: 47 BPM | HEIGHT: 68 IN | DIASTOLIC BLOOD PRESSURE: 72 MMHG | WEIGHT: 199 LBS | SYSTOLIC BLOOD PRESSURE: 122 MMHG

## 2023-06-29 DIAGNOSIS — K62.5 HEMORRHAGE OF ANUS AND RECTUM: ICD-10-CM

## 2023-06-29 DIAGNOSIS — Z86.010 PERSONAL HISTORY OF COLONIC POLYPS: ICD-10-CM

## 2023-06-29 PROCEDURE — 99204 OFFICE O/P NEW MOD 45 MIN: CPT | Performed by: INTERNAL MEDICINE

## 2023-08-15 ENCOUNTER — OFFICE VISIT (OUTPATIENT)
Dept: CARDIOLOGY | Facility: CLINIC | Age: 73
End: 2023-08-15
Payer: MEDICARE

## 2023-08-15 VITALS
WEIGHT: 197 LBS | SYSTOLIC BLOOD PRESSURE: 137 MMHG | DIASTOLIC BLOOD PRESSURE: 81 MMHG | HEIGHT: 67 IN | BODY MASS INDEX: 30.92 KG/M2 | RESPIRATION RATE: 18 BRPM | HEART RATE: 53 BPM

## 2023-08-15 DIAGNOSIS — Z00.00 PREVENTATIVE HEALTH CARE: Primary | ICD-10-CM

## 2023-08-15 RX ORDER — TERBINAFINE HYDROCHLORIDE 250 MG/1
250 TABLET ORAL DAILY
COMMUNITY
Start: 2023-06-07

## 2023-08-15 NOTE — PROGRESS NOTES
Cardiology Clinic Note  Brandon Barrios MD, PhD    Subjective:     Encounter Date:08/15/2023      Patient ID: Giovani Moseley is a 73 y.o. male.    Chief Complaint:  Chief Complaint   Patient presents with    Follow-up       HPI:      At the pleasure to see this 72-year-old gentleman in follow-up.  He has a history of hyperlipidemia and obstructive sleep apnea.  He had an echo in 2019 which demonstrated normal EF.  He has a history of AAA without rupture mixed hyperlipidemia prediabetes per his EMR that I reviewed.  He was under evaluation for carotid /aortic renal disease, he was found to have mild plaquing in the carotid arteries , Aortic ultrasound did not reveal significantly enlarged aorta maximally 2.0 cm also revealed plaquing.  He has no symptoms of abdominal pain presyncope palpitations heart failure or anginal chest pain at this time.  He quit smoking remotely 1995.  He is not diabetic and he is not hypertensive today  No anginal chest pain heart failure signs or symptoms, no history of arrhythmia, sinus rhythm today     Review of systems otherwise negative x14 point review of systems except was mentioned above     Historical data copied forward from previous encounters in EMR including the history, exam, and assessment/plan has been reviewed and is unchanged unless noted otherwise.     Cardiac medicines reviewed with risk, benefits, and necessity of each discussed.     Risk and benefit of cardiac testing reviewed including death heart attack stroke pain bleeding infection need for vascular /cardiovascular surgery were discussed and the patient      Objective:      Vitals reviewed below     Physical Exam  Regular rate and rhythm no rubs gallops heave or lift  No new murmurs  No clubbing cyanosis or edema  Normal pulses normal cap refill  Intact grossly  Clear to auscultation  Assessment:         Assessment          Medical history in EMR remarks about AAA without rupture, no abdominal pain, repeat abdominal  "ultrasound in 1 to 2 years with smoking history     No evidence of uncontrolled blood pressure today  Lipids are controlled on low-dose rosuvastatin, co-Q10, no aspirin on board  No history of coronary disease  No anginal  No heart failure signs or symptoms  Primary prevention goals for CAD and peripheral vascular disease  Goal LDL is less than 130 optimally less than 100, last was 124 this year with total cholesterol of 197    Start bempedoic acid Zetia combination  Goal for 25% reduction in LDL goal less than 100  Return to clinic in 6 months preceded by fasting lipid panel and CMP  We will wait 3 months to start the medication as it is on antifungals for tinea      Heart healthy diet, exercise per AHA guidelines  Non-smoker presently, continue abstinence        Objective:         /81 (BP Location: Left arm, Patient Position: Sitting)   Pulse 53   Resp 18   Ht 170.2 cm (67\")   Wt 89.4 kg (197 lb)   BMI 30.85 kg/mý           The pleasure to be involved in this patient's cardiovascular care.  Please call with any questions or concerns  Brandon Barrios MD, PhD    Most recent EKG as reviewed and interpreted by me:    ECG 12 Lead    Date/Time: 8/15/2023 10:08 AM  Performed by: Brandon Barrios MD  Authorized by: Brandon Barrios MD   Comparison: not compared with previous ECG   Previous ECG: no previous ECG available  Rhythm: sinus rhythm  Rate: normal  Conduction: left anterior fascicular block    Clinical impression: abnormal EKG         Most recent echo as reviewed and interpreted by me:  Results for orders placed during the hospital encounter of 11/13/19    Adult Transthoracic Echo Complete W/ Cont if Necessary Per Protocol    Interpretation Summary  ú Left ventricular systolic function is normal. Calculated EF = 69.0%. Estimated EF was in agreement with the calculated EF. Normal left ventricular cavity size noted. Left ventricular wall thickness is consistent with mild concentric " hypertrophy. Left ventricular diastolic function is normal.  ú The valve appears trileaflet. The aortic valve is abnormal in structure. There is mild calcification of the aortic valve mainly affecting the non, left and right coronary cusp(s).Trace-to-mild aortic valve regurgitation is present. No aortic valve stenosis is present.  ú The mitral valve is grossly normal in structure. Mild mitral valve regurgitation is present.  ú The tricuspid valve is grossly normal. Mild tricuspid valve regurgitation is present. Estimated right ventricular systolic pressure from tricuspid regurgitation is normal (<35 mmHg).      Most recent stress test as reviewed and interpreted by me:      Most recent cardiac catheterization as reviewed interpreted by me:  No results found for this or any previous visit.    The following portions of the patient's history were reviewed and updated as appropriate: allergies, current medications, past family history, past medical history, past social history, past surgical history, and problem list.      ROS:  14 point review of systems negative except as mentioned above    Current Outpatient Medications:     Coenzyme Q10 (CoQ10) 100 MG capsule, , Disp: , Rfl:     doxepin (SINEquan) 10 MG capsule, Take 1 capsule by mouth Every Night. (Patient taking differently: Take 1 capsule by mouth Every Night. prn), Disp: 30 capsule, Rfl: 11    silodosin (RAPAFLO) 8 MG capsule capsule, TAKE ONE (1) CAPSULE BY MOUTH EVERY DAY, Disp: , Rfl:     terbinafine (lamiSIL) 250 MG tablet, Take 1 tablet by mouth Daily., Disp: , Rfl:     Problem List:  Patient Active Problem List   Diagnosis    Hyperlipidemia    Benign localized hyperplasia of prostate    Obstructive sleep apnea syndrome    Metabolic syndrome    AAA (abdominal aortic aneurysm) without rupture    Medicare annual wellness visit, subsequent    Screening for prostate cancer     Past Medical History:  Past Medical History:   Diagnosis Date    AAA (abdominal  aortic aneurysm) without rupture 3/31/2021    Abnormal ECG 2019    I believe past submitted records reflect such    Benign prostate hyperplasia     Cataract     Congenital heart disease 2015    S/A    Coronary artery disease 2015    S/A    Hyperlipidemia     Metabolic syndrome 3/31/2021    Sleep apnea 08    conflicting opinions from different specialists in 2008 and     Past Surgical History:  Past Surgical History:   Procedure Laterality Date    EYE SURGERY Bilateral 10/3 and 10/15    TONSILLECTOMY       Social History:  Social History     Socioeconomic History    Marital status:    Tobacco Use    Smoking status: Former     Packs/day: 0.50     Years: 30.00     Pack years: 15.00     Types: Cigarettes     Start date: 1966     Quit date: 1995     Years since quittin.6    Smokeless tobacco: Never    Tobacco comments:     Stopped in mid    Substance and Sexual Activity    Alcohol use: Yes     Alcohol/week: 2.0 standard drinks     Types: 2 Cans of beer per week     Comment: light drinker when socializing    Drug use: No    Sexual activity: Not Currently     Allergies:  No Known Allergies  Immunizations:  Immunization History   Administered Date(s) Administered    COVID-19 (PFIZER) Purple Cap Monovalent 2021, 2021, 2021    Flu Vaccine Quad PF >36MO 2020    Fluad Quad 65+ 2020, 10/06/2021    Fluzone High Dose =>65 Years ([facility] ONLY) 10/08/2018, 2019    Influenza, Unspecified 2021    Shingrix 10/18/2019, 2019    Tdap 10/06/2022            In-Office Procedure(s):  No orders to display        ASCVD RIsk Score::  The 10-year ASCVD risk score (Jhon MCKEON, et al., 2019) is: 22.3%    Values used to calculate the score:      Age: 73 years      Sex: Male      Is Non- : No      Diabetic: No      Tobacco smoker: No      Systolic Blood Pressure: 137 mmHg      Is BP treated: No      HDL Cholesterol: 53 mg/dL      Total  Cholesterol: 197 mg/dL    Imaging:         Results for orders placed during the hospital encounter of 04/27/21    US Renal Bilateral    Narrative  DATE OF EXAM:  4/27/2021 9:56 AM    PROCEDURE:  US RENAL BILATERAL-    INDICATIONS:  renal insufficiency; N28.9-Disorder of kidney and ureter, unspecified    COMPARISON:  No Comparisons Available    TECHNIQUE:  Grayscale and color Doppler ultrasound evaluation of the kidneys and  urinary bladder was performed.      FINDINGS:  The right kidney measures 11.9 x 6.6 x 7.0 cm. The left kidney measures  11.4 x 5.7 x 5.1 cm. Both kidneys maintain normal cortical thickness and  cortical echotexture. No cystic or solid renal mass, shadowing stone or  hydronephrosis is seen. The urinary bladder is decompressed and not  visualized.    Impression  Normal bilateral renal ultrasound.    Electronically Signed By-Yoana Interiano MD On:4/27/2021 10:34 AM  This report was finalized on [id number] by  Yoana Interiano MD.          Lab Review:   No visits with results within 6 Month(s) from this visit.   Latest known visit with results is:   Office Visit on 01/06/2023   Component Date Value    PSA 01/06/2023 3.580     Total Cholesterol 01/06/2023 197     Triglycerides 01/06/2023 110     HDL Cholesterol 01/06/2023 53     LDL Cholesterol  01/06/2023 124 (H)     VLDL Cholesterol 01/06/2023 20     LDL/HDL Ratio 01/06/2023 2.30     Glucose 01/06/2023 105 (H)     BUN 01/06/2023 13     Creatinine 01/06/2023 0.95     Sodium 01/06/2023 140     Potassium 01/06/2023 4.5     Chloride 01/06/2023 103     CO2 01/06/2023 26.8     Calcium 01/06/2023 9.6     Total Protein 01/06/2023 6.3     Albumin 01/06/2023 4.6     ALT (SGPT) 01/06/2023 17     AST (SGOT) 01/06/2023 17     Alkaline Phosphatase 01/06/2023 43     Total Bilirubin 01/06/2023 1.4 (H)     Globulin 01/06/2023 1.7     A/G Ratio 01/06/2023 2.7     BUN/Creatinine Ratio 01/06/2023 13.7     Anion Gap 01/06/2023 10.2     eGFR 01/06/2023 85.6      Hemoglobin A1C 01/06/2023 5.3      Recent labs reviewed and interpreted for clinical significance and application            Level of Care:           Brandon Barrios MD  08/15/23  .

## 2023-12-01 ENCOUNTER — ON CAMPUS - OUTPATIENT (OUTPATIENT)
Dept: URBAN - METROPOLITAN AREA HOSPITAL 2 | Facility: HOSPITAL | Age: 73
End: 2023-12-01
Payer: COMMERCIAL

## 2023-12-01 ENCOUNTER — OFFICE (OUTPATIENT)
Dept: URBAN - METROPOLITAN AREA PATHOLOGY 4 | Facility: PATHOLOGY | Age: 73
End: 2023-12-01
Payer: COMMERCIAL

## 2023-12-01 VITALS
HEART RATE: 75 BPM | DIASTOLIC BLOOD PRESSURE: 81 MMHG | DIASTOLIC BLOOD PRESSURE: 100 MMHG | OXYGEN SATURATION: 98 % | SYSTOLIC BLOOD PRESSURE: 166 MMHG | RESPIRATION RATE: 16 BRPM | DIASTOLIC BLOOD PRESSURE: 85 MMHG | SYSTOLIC BLOOD PRESSURE: 140 MMHG | OXYGEN SATURATION: 99 % | WEIGHT: 192 LBS | SYSTOLIC BLOOD PRESSURE: 118 MMHG | HEART RATE: 69 BPM | SYSTOLIC BLOOD PRESSURE: 122 MMHG | HEART RATE: 68 BPM | SYSTOLIC BLOOD PRESSURE: 124 MMHG | HEART RATE: 79 BPM | DIASTOLIC BLOOD PRESSURE: 82 MMHG | SYSTOLIC BLOOD PRESSURE: 135 MMHG | HEART RATE: 67 BPM | TEMPERATURE: 96.8 F | HEART RATE: 73 BPM | DIASTOLIC BLOOD PRESSURE: 99 MMHG | OXYGEN SATURATION: 97 % | DIASTOLIC BLOOD PRESSURE: 95 MMHG | DIASTOLIC BLOOD PRESSURE: 78 MMHG | HEART RATE: 72 BPM | HEIGHT: 68 IN | SYSTOLIC BLOOD PRESSURE: 128 MMHG | OXYGEN SATURATION: 100 % | SYSTOLIC BLOOD PRESSURE: 129 MMHG

## 2023-12-01 DIAGNOSIS — K57.30 DIVERTICULOSIS OF LARGE INTESTINE WITHOUT PERFORATION OR ABS: ICD-10-CM

## 2023-12-01 DIAGNOSIS — K63.5 POLYP OF COLON: ICD-10-CM

## 2023-12-01 DIAGNOSIS — D12.2 BENIGN NEOPLASM OF ASCENDING COLON: ICD-10-CM

## 2023-12-01 DIAGNOSIS — K62.5 HEMORRHAGE OF ANUS AND RECTUM: ICD-10-CM

## 2023-12-01 DIAGNOSIS — K64.4 RESIDUAL HEMORRHOIDAL SKIN TAGS: ICD-10-CM

## 2023-12-01 DIAGNOSIS — K64.0 FIRST DEGREE HEMORRHOIDS: ICD-10-CM

## 2023-12-01 LAB
GI HISTOLOGY: A. UNSPECIFIED: (no result)
GI HISTOLOGY: B. UNSPECIFIED: (no result)
GI HISTOLOGY: PDF REPORT: (no result)

## 2023-12-01 PROCEDURE — 88305 TISSUE EXAM BY PATHOLOGIST: CPT | Mod: 26 | Performed by: INTERNAL MEDICINE

## 2023-12-01 PROCEDURE — 45385 COLONOSCOPY W/LESION REMOVAL: CPT | Performed by: INTERNAL MEDICINE

## 2023-12-27 NOTE — PROGRESS NOTES
"Chief Complaint  Sleep Apnea    Subjective          Giovani Moseley presents to [facility]  History of Present Illness    MIREYA, yearly f/u for CPAP compliance, patient doing well with pap therapy,  He uses a FFM and goes through [employer] [facility] in [place] for supplies. He takes doxepin 10 mg hs to help him sleep.  Has two new CPAP machine.     Sleep testing history:    First diagnosed in 2008 with ahi of 18  Repeat npsg in 2016 with ahi 2.4 and rdi 20    PAP download: report pending, pt reports doing well with the CPAP machine    Bybee Sleepiness Scale:  Sitting and reading 1 WatchingTV 1  Sitting, inactive, in a public place 0  As a passenger in a car for 1 hour w/o a break  0  Lying down to rest in the afternoon  0  Sitting and talking to someone  0  Sitting quietly after a lunch  0  In a car, while stopped for traffic or a light  0  Total 2    Takes doxepin for insomnia prn, does help when used.    Review of Systems   Constitutional:  Positive for fatigue.   Respiratory:  Positive for apnea.    Psychiatric/Behavioral:  Positive for sleep disturbance.    All other systems reviewed and are negative.        Objective   Vital Signs:   /67   Pulse 59   Resp 16   Ht 170.2 cm (67\")   Wt 86.2 kg (190 lb)   BMI 29.76 kg/m²     Physical Exam  Vitals reviewed.   Constitutional:       Appearance: Normal appearance.   Pulmonary:      Effort: Pulmonary effort is normal. No respiratory distress.   Neurological:      General: No focal deficit present.      Mental Status: He is alert and oriented to person, place, and time.   Psychiatric:         Mood and Affect: Mood normal.      Result Review :                 Assessment and Plan    Diagnoses and all orders for this visit:    1. Obstructive sleep apnea syndrome (Primary)      Continue CPAP,  will review report.   The patient is compliant with and benefiting from PAP therapy.      Follow Up   Return in about 1 year (around 1/3/2025).    Patient was " given instructions and counseling regarding his condition or for health maintenance advice. Please see specific information pulled into the AVS if appropriate.       This document has been electronically signed by Joseph Seipel, MD on January 3, 2024 09:24 EST

## 2024-01-03 ENCOUNTER — OFFICE VISIT (OUTPATIENT)
Dept: NEUROLOGY | Facility: CLINIC | Age: 74
End: 2024-01-03
Payer: MEDICARE

## 2024-01-03 VITALS
HEIGHT: 67 IN | WEIGHT: 190 LBS | RESPIRATION RATE: 16 BRPM | SYSTOLIC BLOOD PRESSURE: 147 MMHG | BODY MASS INDEX: 29.82 KG/M2 | DIASTOLIC BLOOD PRESSURE: 67 MMHG | HEART RATE: 59 BPM

## 2024-01-03 DIAGNOSIS — G47.33 OBSTRUCTIVE SLEEP APNEA SYNDROME: Primary | ICD-10-CM

## 2024-01-03 PROCEDURE — 1160F RVW MEDS BY RX/DR IN RCRD: CPT | Performed by: PSYCHIATRY & NEUROLOGY

## 2024-01-03 PROCEDURE — 1159F MED LIST DOCD IN RCRD: CPT | Performed by: PSYCHIATRY & NEUROLOGY

## 2024-01-03 PROCEDURE — 99213 OFFICE O/P EST LOW 20 MIN: CPT | Performed by: PSYCHIATRY & NEUROLOGY

## 2024-01-03 RX ORDER — SORBITOL SOLUTION 70 %
SOLUTION, ORAL MISCELLANEOUS SEE ADMIN INSTRUCTIONS
COMMUNITY
Start: 2023-11-20

## 2024-01-03 RX ORDER — DOXEPIN HYDROCHLORIDE 10 MG/1
10 CAPSULE ORAL AS NEEDED
Qty: 30 CAPSULE | Refills: 5 | Status: SHIPPED | OUTPATIENT
Start: 2024-01-03

## 2024-01-10 ENCOUNTER — OFFICE VISIT (OUTPATIENT)
Dept: FAMILY MEDICINE CLINIC | Facility: CLINIC | Age: 74
End: 2024-01-10
Payer: MEDICARE

## 2024-01-10 ENCOUNTER — LAB (OUTPATIENT)
Dept: FAMILY MEDICINE CLINIC | Facility: CLINIC | Age: 74
End: 2024-01-10
Payer: MEDICARE

## 2024-01-10 VITALS
OXYGEN SATURATION: 96 % | SYSTOLIC BLOOD PRESSURE: 162 MMHG | WEIGHT: 189.2 LBS | HEART RATE: 55 BPM | TEMPERATURE: 97.5 F | BODY MASS INDEX: 29.7 KG/M2 | DIASTOLIC BLOOD PRESSURE: 87 MMHG | HEIGHT: 67 IN

## 2024-01-10 DIAGNOSIS — Z12.5 SCREENING FOR PROSTATE CANCER: ICD-10-CM

## 2024-01-10 DIAGNOSIS — Z00.00 MEDICARE ANNUAL WELLNESS VISIT, SUBSEQUENT: Primary | ICD-10-CM

## 2024-01-10 DIAGNOSIS — R05.1 ACUTE COUGH: ICD-10-CM

## 2024-01-10 DIAGNOSIS — R73.9 HYPERGLYCEMIA: ICD-10-CM

## 2024-01-10 DIAGNOSIS — E78.2 MIXED HYPERLIPIDEMIA: ICD-10-CM

## 2024-01-10 DIAGNOSIS — Z87.891 FORMER SMOKER: ICD-10-CM

## 2024-01-10 DIAGNOSIS — I10 PRIMARY HYPERTENSION: ICD-10-CM

## 2024-01-10 LAB
ALBUMIN SERPL-MCNC: 4.6 G/DL (ref 3.5–5.2)
ALBUMIN/GLOB SERPL: 2 G/DL
ALP SERPL-CCNC: 38 U/L (ref 39–117)
ALT SERPL W P-5'-P-CCNC: 19 U/L (ref 1–41)
ANION GAP SERPL CALCULATED.3IONS-SCNC: 11.6 MMOL/L (ref 5–15)
AST SERPL-CCNC: 21 U/L (ref 1–40)
BASOPHILS # BLD AUTO: 0.03 10*3/MM3 (ref 0–0.2)
BASOPHILS NFR BLD AUTO: 0.7 % (ref 0–1.5)
BILIRUB SERPL-MCNC: 1.1 MG/DL (ref 0–1.2)
BUN SERPL-MCNC: 13 MG/DL (ref 8–23)
BUN/CREAT SERPL: 14.3 (ref 7–25)
CALCIUM SPEC-SCNC: 9.4 MG/DL (ref 8.6–10.5)
CHLORIDE SERPL-SCNC: 105 MMOL/L (ref 98–107)
CHOLEST SERPL-MCNC: 144 MG/DL (ref 0–200)
CO2 SERPL-SCNC: 24.4 MMOL/L (ref 22–29)
CREAT SERPL-MCNC: 0.91 MG/DL (ref 0.76–1.27)
DEPRECATED RDW RBC AUTO: 47.6 FL (ref 37–54)
EGFRCR SERPLBLD CKD-EPI 2021: 89 ML/MIN/1.73
EOSINOPHIL # BLD AUTO: 0.05 10*3/MM3 (ref 0–0.4)
EOSINOPHIL NFR BLD AUTO: 1.2 % (ref 0.3–6.2)
ERYTHROCYTE [DISTWIDTH] IN BLOOD BY AUTOMATED COUNT: 12.9 % (ref 12.3–15.4)
GLOBULIN UR ELPH-MCNC: 2.3 GM/DL
GLUCOSE SERPL-MCNC: 96 MG/DL (ref 65–99)
HBA1C MFR BLD: 5.7 % (ref 4.8–5.6)
HCT VFR BLD AUTO: 47.9 % (ref 37.5–51)
HDLC SERPL-MCNC: 56 MG/DL (ref 40–60)
HGB BLD-MCNC: 15.9 G/DL (ref 13–17.7)
IMM GRANULOCYTES # BLD AUTO: 0.01 10*3/MM3 (ref 0–0.05)
IMM GRANULOCYTES NFR BLD AUTO: 0.2 % (ref 0–0.5)
LDLC SERPL CALC-MCNC: 76 MG/DL (ref 0–100)
LDLC/HDLC SERPL: 1.36 {RATIO}
LYMPHOCYTES # BLD AUTO: 1.25 10*3/MM3 (ref 0.7–3.1)
LYMPHOCYTES NFR BLD AUTO: 29.7 % (ref 19.6–45.3)
MCH RBC QN AUTO: 33.1 PG (ref 26.6–33)
MCHC RBC AUTO-ENTMCNC: 33.2 G/DL (ref 31.5–35.7)
MCV RBC AUTO: 99.6 FL (ref 79–97)
MONOCYTES # BLD AUTO: 0.47 10*3/MM3 (ref 0.1–0.9)
MONOCYTES NFR BLD AUTO: 11.2 % (ref 5–12)
NEUTROPHILS NFR BLD AUTO: 2.4 10*3/MM3 (ref 1.7–7)
NEUTROPHILS NFR BLD AUTO: 57 % (ref 42.7–76)
NRBC BLD AUTO-RTO: 0 /100 WBC (ref 0–0.2)
PLATELET # BLD AUTO: 227 10*3/MM3 (ref 140–450)
PMV BLD AUTO: 10.4 FL (ref 6–12)
POTASSIUM SERPL-SCNC: 4.4 MMOL/L (ref 3.5–5.2)
PROT SERPL-MCNC: 6.9 G/DL (ref 6–8.5)
PSA SERPL-MCNC: 2.91 NG/ML (ref 0–4)
RBC # BLD AUTO: 4.81 10*6/MM3 (ref 4.14–5.8)
SODIUM SERPL-SCNC: 141 MMOL/L (ref 136–145)
TRIGL SERPL-MCNC: 58 MG/DL (ref 0–150)
VLDLC SERPL-MCNC: 12 MG/DL (ref 5–40)
WBC NRBC COR # BLD AUTO: 4.21 10*3/MM3 (ref 3.4–10.8)

## 2024-01-10 PROCEDURE — 80053 COMPREHEN METABOLIC PANEL: CPT | Performed by: FAMILY MEDICINE

## 2024-01-10 PROCEDURE — 83036 HEMOGLOBIN GLYCOSYLATED A1C: CPT | Performed by: FAMILY MEDICINE

## 2024-01-10 PROCEDURE — 36415 COLL VENOUS BLD VENIPUNCTURE: CPT | Performed by: FAMILY MEDICINE

## 2024-01-10 PROCEDURE — G0103 PSA SCREENING: HCPCS | Performed by: FAMILY MEDICINE

## 2024-01-10 PROCEDURE — 1159F MED LIST DOCD IN RCRD: CPT | Performed by: FAMILY MEDICINE

## 2024-01-10 PROCEDURE — G0439 PPPS, SUBSEQ VISIT: HCPCS | Performed by: FAMILY MEDICINE

## 2024-01-10 PROCEDURE — 80061 LIPID PANEL: CPT | Performed by: FAMILY MEDICINE

## 2024-01-10 PROCEDURE — 1160F RVW MEDS BY RX/DR IN RCRD: CPT | Performed by: FAMILY MEDICINE

## 2024-01-10 PROCEDURE — 85025 COMPLETE CBC W/AUTO DIFF WBC: CPT | Performed by: FAMILY MEDICINE

## 2024-01-10 PROCEDURE — 1170F FXNL STATUS ASSESSED: CPT | Performed by: FAMILY MEDICINE

## 2024-01-10 RX ORDER — LOSARTAN POTASSIUM 25 MG/1
25 TABLET ORAL DAILY
Qty: 90 TABLET | Refills: 3 | Status: SHIPPED | OUTPATIENT
Start: 2024-01-10

## 2024-01-10 NOTE — PROGRESS NOTES
Called and spoke to patient.  Gave her the name and phone number to [facility].   The ABCs of the Annual Wellness Visit  Subsequent Medicare Wellness Visit    Subjective      Giovani Moseley is a 74 y.o. male who presents for a Subsequent Medicare Wellness Visit.    The following portions of the patient's history were reviewed and   updated as appropriate: allergies, current medications, past family history, past medical history, past social history, past surgical history, and problem list.    Compared to one year ago, the patient feels his physical   health is the same.    Compared to one year ago, the patient feels his mental   health is the same.    Recent Hospitalizations:  He was not admitted to the hospital during the last year.       Current Medical Providers:  Patient Care Team:  Yvonne Santa MD as PCP - General (Family Medicine)  Patrick Crane MD as Consulting Physician (Urology)  Seipel, Joseph F, MD as Consulting Physician (Sleep Medicine)  Hollis Redd MD as Consulting Physician (Cardiology)  Alexey Stuart MD as Consulting Physician (Ophthalmology)    Outpatient Medications Prior to Visit   Medication Sig Dispense Refill    Bempedoic Acid-Ezetimibe 180-10 MG tablet Take 1 tablet by mouth Daily. 30 tablet 11    Coenzyme Q10 (CoQ10) 100 MG capsule       doxepin (SINEquan) 10 MG capsule Take 1 capsule by mouth As Needed for Sleep. prn 30 capsule 5    silodosin (RAPAFLO) 8 MG capsule capsule TAKE ONE (1) CAPSULE BY MOUTH EVERY DAY      sorbitol 70 % solution solution See Admin Instructions.       No facility-administered medications prior to visit.       No opioid medication identified on active medication list. I have reviewed chart for other potential  high risk medication/s and harmful drug interactions in the elderly.        Aspirin is not on active medication list.  Aspirin use is not indicated based on review of current medical condition/s. Risk of harm outweighs potential benefits.  .    Patient Active Problem List   Diagnosis    Hyperlipidemia     "Benign localized hyperplasia of prostate    Obstructive sleep apnea syndrome    Metabolic syndrome    AAA (abdominal aortic aneurysm) without rupture    Medicare annual wellness visit, subsequent    Screening for prostate cancer     Advance Care Planning   Advance Care Planning     Advance Directive is not on file.  ACP discussion was held with the patient during this visit. Patient does not have an advance directive, information provided.     Objective    Vitals:    01/10/24 0821   BP: 162/87   BP Location: Left arm   Patient Position: Sitting   Cuff Size: Adult   Pulse: 55   Temp: 97.5 °F (36.4 °C)   TempSrc: Infrared   SpO2: 96%   Weight: 85.8 kg (189 lb 3.2 oz)   Height: 170.2 cm (67\")     Estimated body mass index is 29.63 kg/m² as calculated from the following:    Height as of this encounter: 170.2 cm (67\").    Weight as of this encounter: 85.8 kg (189 lb 3.2 oz).    BMI is >= 25 and <30. (Overweight) The following options were offered after discussion;: exercise counseling/recommendations    Physical Exam  Vitals and nursing note reviewed.   Constitutional:       General: He is not in acute distress.     Appearance: He is well-developed.   HENT:      Head: Normocephalic.      Right Ear: Tympanic membrane normal.      Left Ear: Tympanic membrane normal.      Mouth/Throat:      Mouth: Mucous membranes are moist.   Eyes:      General: Lids are normal.      Conjunctiva/sclera: Conjunctivae normal.      Pupils: Pupils are equal, round, and reactive to light.   Neck:      Thyroid: No thyroid mass or thyromegaly.      Trachea: Trachea normal.   Cardiovascular:      Rate and Rhythm: Normal rate and regular rhythm.      Heart sounds: Normal heart sounds.   Pulmonary:      Effort: Pulmonary effort is normal.      Breath sounds: Normal breath sounds.   Abdominal:      Palpations: Abdomen is soft.   Musculoskeletal:      Cervical back: Normal range of motion.      Right lower leg: No edema.      Left lower leg: No edema. "   Lymphadenopathy:      Cervical: No cervical adenopathy.   Skin:     General: Skin is warm and dry.   Neurological:      Mental Status: He is alert and oriented to person, place, and time.   Psychiatric:         Attention and Perception: He is attentive.         Mood and Affect: Mood normal.         Speech: Speech normal.         Behavior: Behavior normal.         Does the patient have evidence of cognitive impairment?   No            HEALTH RISK ASSESSMENT    Smoking Status:  Social History     Tobacco Use   Smoking Status Former    Packs/day: 1.00    Years: 30.00    Additional pack years: 0.00    Total pack years: 30.00    Types: Cigarettes    Start date: 1965    Quit date: 2000    Years since quittin.0    Passive exposure: Past   Smokeless Tobacco Never   Tobacco Comments    Stopped approx      Alcohol Consumption:  Social History     Substance and Sexual Activity   Alcohol Use Yes    Alcohol/week: 2.0 standard drinks of alcohol    Types: 2 Cans of beer per week    Comment: light drinker when socializing     Fall Risk Screen:    JESSE Fall Risk Assessment was completed, and patient is at LOW risk for falls.Assessment completed on:1/10/2024    Depression Screenin/10/2024     8:38 AM   PHQ-2/PHQ-9 Depression Screening   Little Interest or Pleasure in Doing Things 0-->not at all   Feeling Down, Depressed or Hopeless 0-->not at all   PHQ-9: Brief Depression Severity Measure Score 0       Health Habits and Functional and Cognitive Screenin/10/2024     8:00 AM   Functional & Cognitive Status   Do you have difficulty preparing food and eating? No   Do you have difficulty bathing yourself, getting dressed or grooming yourself? No   Do you have difficulty using the toilet? No   Do you have difficulty moving around from place to place? No   Do you have trouble with steps or getting out of a bed or a chair? No   Current Diet Well Balanced Diet   Dental Exam Up to date   Eye Exam Up to  date   Exercise (times per week) 3 times per week   Do you need help using the phone?  No   Are you deaf or do you have serious difficulty hearing?  No   Do you need help to go to places out of walking distance? No   Do you need help shopping? No   Do you need help preparing meals?  No   Do you need help with housework?  No   Do you need help with laundry? No   Do you need help taking your medications? No   Do you ever drive or ride in a car without wearing a seat belt? No   Have you felt unusual stress, anger or loneliness in the last month? No   Who do you live with? Spouse   If you need help, do you have trouble finding someone available to you? No   Do you have difficulty concentrating, remembering or making decisions? No       Age-appropriate Screening Schedule:  Refer to the list below for future screening recommendations based on patient's age, sex and/or medical conditions. Orders for these recommended tests are listed in the plan section. The patient has been provided with a written plan.    Health Maintenance   Topic Date Due    LIPID PANEL  01/06/2024    Pneumococcal Vaccine 65+ (1 of 1 - PCV) 01/10/2025 (Originally 1/9/2016)    ANNUAL WELLNESS VISIT  01/10/2025    BMI FOLLOWUP  01/10/2025    TDAP/TD VACCINES (2 - Td or Tdap) 10/06/2032    COLORECTAL CANCER SCREENING  12/01/2033    HEPATITIS C SCREENING  Completed    COVID-19 Vaccine  Completed    INFLUENZA VACCINE  Completed    AAA SCREEN (ONE-TIME)  Completed    ZOSTER VACCINE  Completed                  CMS Preventative Services Quick Reference  Risk Factors Identified During Encounter:    Inactivity/Sedentary: Patient was advised to exercise at least 150 minutes a week per CDC recommendations.  Dental Screening Recommended  Vision Screening Recommended    The above risks/problems have been discussed with the patient.  Pertinent information has been shared with the patient in the After Visit Summary.    Diagnoses and all orders for this visit:    1.  Medicare annual wellness visit, subsequent (Primary)    2. Primary hypertension  -     losartan (Cozaar) 25 MG tablet; Take 1 tablet by mouth Daily.  Dispense: 90 tablet; Refill: 3    3. Screening for prostate cancer  -     PSA Screen    4. Mixed hyperlipidemia  -     CBC & Differential  -     Comprehensive Metabolic Panel  -     Hemoglobin A1c  -     Lipid Panel    5. Hyperglycemia  -     CBC & Differential  -     Comprehensive Metabolic Panel  -     Hemoglobin A1c  -     Lipid Panel    6. Former smoker  -     XR Chest 2 View    7. Acute cough  -     XR Chest 2 View        Follow Up:   Next Medicare Wellness visit to be scheduled in 1 year.      An After Visit Summary and PPPS were made available to the patient.

## 2024-01-15 ENCOUNTER — HOSPITAL ENCOUNTER (OUTPATIENT)
Dept: GENERAL RADIOLOGY | Facility: HOSPITAL | Age: 74
Discharge: HOME | End: 2024-01-15
Admitting: FAMILY MEDICINE
Payer: MEDICARE

## 2024-01-15 PROCEDURE — 71046 X-RAY EXAM CHEST 2 VIEWS: CPT

## 2024-02-02 ENCOUNTER — OFFICE VISIT (OUTPATIENT)
Dept: CARDIOLOGY | Facility: CLINIC | Age: 74
End: 2024-02-02
Payer: MEDICARE

## 2024-02-02 VITALS
WEIGHT: 190 LBS | HEART RATE: 56 BPM | HEIGHT: 67 IN | BODY MASS INDEX: 29.82 KG/M2 | SYSTOLIC BLOOD PRESSURE: 114 MMHG | DIASTOLIC BLOOD PRESSURE: 73 MMHG | RESPIRATION RATE: 18 BRPM

## 2024-02-02 DIAGNOSIS — Z91.89 MULTIPLE RISK FACTORS FOR CORONARY ARTERY DISEASE: Primary | ICD-10-CM

## 2024-02-07 ENCOUNTER — PROCEDURE VISIT (OUTPATIENT)
Dept: NEUROLOGY | Facility: CLINIC | Age: 74
End: 2024-02-07
Payer: MEDICARE

## 2024-02-07 VITALS
SYSTOLIC BLOOD PRESSURE: 157 MMHG | HEART RATE: 73 BPM | WEIGHT: 190 LBS | DIASTOLIC BLOOD PRESSURE: 73 MMHG | HEIGHT: 67 IN | BODY MASS INDEX: 29.82 KG/M2

## 2024-02-07 DIAGNOSIS — R29.898 WEAKNESS OF RIGHT HAND: Primary | ICD-10-CM

## 2024-02-07 PROBLEM — M50.30 DDD (DEGENERATIVE DISC DISEASE), CERVICAL: Status: ACTIVE | Noted: 2023-10-30

## 2024-02-07 PROCEDURE — 95886 MUSC TEST DONE W/N TEST COMP: CPT | Performed by: PSYCHIATRY & NEUROLOGY

## 2024-02-07 PROCEDURE — 95908 NRV CNDJ TST 3-4 STUDIES: CPT | Performed by: PSYCHIATRY & NEUROLOGY

## 2024-02-08 NOTE — PROGRESS NOTES
Cardiology Clinic Note  Brandon Barrios MD, PhD    Subjective:     Encounter Date:02/02/2024      Patient ID: Giovani Moseley is a 74 y.o. male.    Chief Complaint:  Chief Complaint   Patient presents with    Follow-up       HPI:      At the pleasure to see this 74-year-old gentleman in follow-up.  He has a history of hyperlipidemia and obstructive sleep apnea.  He had an echo in 2019 which demonstrated normal EF.  He has a history of AAA without rupture mixed hyperlipidemia prediabetes per his EMR that I reviewed.  He was under evaluation for carotid /aortic renal disease, he was found to have mild plaquing in the carotid arteries , Aortic ultrasound did not reveal significantly enlarged aorta maximally 2.0 cm,  also revealed mild plaquing.  He has no symptoms of abdominal pain presyncope palpitations heart failure or anginal chest pain at this time.  He quit smoking remotely 1995.  He is not diabetic and he is not hypertensive today.  BMI under 30  No anginal chest pain heart failure signs or symptoms, no history of arrhythmia, sinus rhythm today, he appears to be doing well on her follow-up today he had labs with his PCP.  His blood pressure and heart rate are at goal and his weight is essentially identical to prior visit.  We discussed diet exercise healthy activity and heart healthy lifestyle.  He is on primary prevention goals, continues with afterload reduction with general losartan and lipid reduction with bempedoic acid and Zetia     Review of systems otherwise negative x14 point review of systems except was mentioned above     Historical data copied forward from previous encounters in EMR including the history, exam, and assessment/plan has been reviewed and is unchanged unless noted otherwise.     Cardiac medicines reviewed with risk, benefits, and necessity of each discussed.     Risk and benefit of cardiac testing reviewed including death heart attack stroke pain bleeding infection need for vascular  "/cardiovascular surgery were discussed and the patient      Objective:      Vitals reviewed below     Physical Exam  Regular rate and rhythm no rubs gallops heave or lift  No new murmurs  No clubbing cyanosis or edema  Normal pulses normal cap refill  Intact grossly  Clear to auscultation  Unchanged from prior encounter  Assessment:      Independently manage medical conditions today  History of AAA without rupture  Essential hypertension  Hyperlipidemia  Risk factors for coronary disease  Preventative health care     Medical history in EMR remarks about AAA without rupture, no abdominal pain, repeat abdominal ultrasound in 1 to 2 years with smoking history versus abdominal CT     No evidence of uncontrolled blood pressure today  Lipids are controlled on low-dose rosuvastatin, co-Q10, no aspirin on board  No history of coronary disease  No anginal  No heart failure signs or symptoms  Primary prevention goals for CAD and peripheral vascular disease  Goal LDL is less than 130 optimally less than 100, this year January, LDL down to 76 with HDL of 56 total at 144 which is great, has had great results on bempedoic acid and Zetia, continue    Get coronary calcium scoring for further risk assessment and screening    Back to clinic 1 year       Non-smoker presently, continue abstinence  It is a pleasure to be involved in his cardiovascular care.  Please call with any questions or concerns  Brandon Barrios MD, PhD    Objective:         /73 (BP Location: Left arm, Patient Position: Sitting)   Pulse 56   Resp 18   Ht 170.2 cm (67.01\")   Wt 86.2 kg (190 lb)   BMI 29.75 kg/m²           The pleasure to be involved in this patient's cardiovascular care.  Please call with any questions or concerns  Brandon Barrios MD, PhD    Most recent EKG as reviewed and interpreted by me:  Procedures     Most recent echo as reviewed and interpreted by me:  Results for orders placed during the hospital encounter of 11/13/19    Adult " Transthoracic Echo Complete W/ Cont if Necessary Per Protocol    Interpretation Summary  · Left ventricular systolic function is normal. Calculated EF = 69.0%. Estimated EF was in agreement with the calculated EF. Normal left ventricular cavity size noted. Left ventricular wall thickness is consistent with mild concentric hypertrophy. Left ventricular diastolic function is normal.  · The valve appears trileaflet. The aortic valve is abnormal in structure. There is mild calcification of the aortic valve mainly affecting the non, left and right coronary cusp(s).Trace-to-mild aortic valve regurgitation is present. No aortic valve stenosis is present.  · The mitral valve is grossly normal in structure. Mild mitral valve regurgitation is present.  · The tricuspid valve is grossly normal. Mild tricuspid valve regurgitation is present. Estimated right ventricular systolic pressure from tricuspid regurgitation is normal (<35 mmHg).      Most recent stress test as reviewed and interpreted by me:      Most recent cardiac catheterization as reviewed interpreted by me:  No results found for this or any previous visit.    The following portions of the patient's history were reviewed and updated as appropriate: allergies, current medications, past family history, past medical history, past social history, past surgical history, and problem list.      ROS:  14 point review of systems negative except as mentioned above    Current Outpatient Medications:     Coenzyme Q10 (CoQ10) 100 MG capsule, , Disp: , Rfl:     doxepin (SINEquan) 10 MG capsule, Take 1 capsule by mouth As Needed for Sleep. prn, Disp: 30 capsule, Rfl: 5    losartan (Cozaar) 25 MG tablet, Take 1 tablet by mouth Daily., Disp: 90 tablet, Rfl: 3    silodosin (RAPAFLO) 8 MG capsule capsule, TAKE ONE (1) CAPSULE BY MOUTH EVERY DAY, Disp: , Rfl:     Bempedoic Acid-Ezetimibe 180-10 MG tablet, Take 1 tablet by mouth Daily., Disp: 30 tablet, Rfl: 5    Problem List:  Patient  Active Problem List   Diagnosis    Hyperlipidemia    Benign localized hyperplasia of prostate    Obstructive sleep apnea syndrome    Metabolic syndrome    AAA (abdominal aortic aneurysm) without rupture    Medicare annual wellness visit, subsequent    Screening for prostate cancer    Weakness of right hand    DDD (degenerative disc disease), cervical     Past Medical History:  Past Medical History:   Diagnosis Date    AAA (abdominal aortic aneurysm) without rupture 3/31/2021    Abnormal ECG 2019    I believe past submitted records reflect such    Benign prostate hyperplasia     Cataract     Congenital heart disease 2015    S/A    Coronary artery disease 2015    S/A    Hyperlipidemia     Metabolic syndrome 3/31/2021    Sleep apnea 08    conflicting opinions from different specialists in 2008 and     Past Surgical History:  Past Surgical History:   Procedure Laterality Date    EYE SURGERY Bilateral 10/3 and 10/15    TONSILLECTOMY       Social History:  Social History     Socioeconomic History    Marital status:    Tobacco Use    Smoking status: Former     Packs/day: 1.00     Years: 30.00     Additional pack years: 0.00     Total pack years: 30.00     Types: Cigarettes     Start date: 1965     Quit date: 2000     Years since quittin.1     Passive exposure: Past    Smokeless tobacco: Never    Tobacco comments:     Stopped approx    Vaping Use    Vaping Use: Never used   Substance and Sexual Activity    Alcohol use: Yes     Alcohol/week: 2.0 standard drinks of alcohol     Types: 2 Cans of beer per week     Comment: light drinker when socializing    Drug use: No    Sexual activity: Not Currently     Allergies:  No Known Allergies  Immunizations:  Immunization History   Administered Date(s) Administered    ABRYSVO (RSV, 60+ or pregnant women 32-36 wks) 10/19/2023    COVID-19 (PFIZER) Purple Cap Monovalent 2021, 2021, 2021    COVID-19 F23 (PFIZER) 12YRS+  (COMIRNATY) 09/28/2023    Flu Vaccine Quad PF >36MO 09/23/2020    Fluad Quad 65+ 09/23/2020, 10/06/2021    Fluzone High Dose =>65 Years ([employer] ONLY) 10/08/2018, 09/06/2019    Influenza Injectable Mdck Pf Quad 10/04/2023    Influenza, Unspecified 09/28/2021    Shingrix 10/18/2019, 12/18/2019    Tdap 10/06/2022            In-Office Procedure(s):  No orders to display        ASCVD RIsk Score::  The 10-year ASCVD risk score (Jhon MCKEON, et al., 2019) is: 29.4%    Values used to calculate the score:      Age: 74 years      Sex: Male      Is Non- : No      Diabetic: No      Tobacco smoker: No      Systolic Blood Pressure: 157 mmHg      Is BP treated: Yes      HDL Cholesterol: 56 mg/dL      Total Cholesterol: 144 mg/dL    Imaging:    Results for orders placed in visit on 01/10/24    XR Chest 2 View    Narrative  XR CHEST 2 VW    Date of Exam: 1/15/2024 10:57 AM EST    Indication: former smoker, cough    Comparison: None available.    Findings:  Lungs are adequately expanded and appear clear. No consolidation or pleural effusion is seen. Heart size is normal. There are degenerative changes in the thoracic spine.    Impression  Impression:  No acute cardiopulmonary abnormality is identified.      Electronically Signed: Obdulia Painter  1/15/2024 4:54 PM EST  Workstation ID: [id number]       Results for orders placed during the hospital encounter of 04/27/21    US Renal Bilateral    Narrative  DATE OF EXAM:  4/27/2021 9:56 AM    PROCEDURE:  US RENAL BILATERAL-    INDICATIONS:  renal insufficiency; N28.9-Disorder of kidney and ureter, unspecified    COMPARISON:  No Comparisons Available    TECHNIQUE:  Grayscale and color Doppler ultrasound evaluation of the kidneys and  urinary bladder was performed.      FINDINGS:  The right kidney measures 11.9 x 6.6 x 7.0 cm. The left kidney measures  11.4 x 5.7 x 5.1 cm. Both kidneys maintain normal cortical thickness and  cortical echotexture. No cystic or solid renal  mass, shadowing stone or  hydronephrosis is seen. The urinary bladder is decompressed and not  visualized.    Impression  Normal bilateral renal ultrasound.    Electronically Signed By-Yoana Interiano MD On:4/27/2021 10:34 AM  This report was finalized on [id number] by  Yoana Interiano MD.          Lab Review:   Office Visit on 01/10/2024   Component Date Value    Glucose 01/10/2024 96     BUN 01/10/2024 13     Creatinine 01/10/2024 0.91     Sodium 01/10/2024 141     Potassium 01/10/2024 4.4     Chloride 01/10/2024 105     CO2 01/10/2024 24.4     Calcium 01/10/2024 9.4     Total Protein 01/10/2024 6.9     Albumin 01/10/2024 4.6     ALT (SGPT) 01/10/2024 19     AST (SGOT) 01/10/2024 21     Alkaline Phosphatase 01/10/2024 38 (L)     Total Bilirubin 01/10/2024 1.1     Globulin 01/10/2024 2.3     A/G Ratio 01/10/2024 2.0     BUN/Creatinine Ratio 01/10/2024 14.3     Anion Gap 01/10/2024 11.6     eGFR 01/10/2024 89.0     Hemoglobin A1C 01/10/2024 5.70 (H)     Total Cholesterol 01/10/2024 144     Triglycerides 01/10/2024 58     HDL Cholesterol 01/10/2024 56     LDL Cholesterol  01/10/2024 76     VLDL Cholesterol 01/10/2024 12     LDL/HDL Ratio 01/10/2024 1.36     PSA 01/10/2024 2.910     WBC 01/10/2024 4.21     RBC 01/10/2024 4.81     Hemoglobin 01/10/2024 15.9     Hematocrit 01/10/2024 47.9     MCV 01/10/2024 99.6 (H)     MCH 01/10/2024 33.1 (H)     MCHC 01/10/2024 33.2     RDW 01/10/2024 12.9     RDW-SD 01/10/2024 47.6     MPV 01/10/2024 10.4     Platelets 01/10/2024 227     Neutrophil % 01/10/2024 57.0     Lymphocyte % 01/10/2024 29.7     Monocyte % 01/10/2024 11.2     Eosinophil % 01/10/2024 1.2     Basophil % 01/10/2024 0.7     Immature Grans % 01/10/2024 0.2     Neutrophils, Absolute 01/10/2024 2.40     Lymphocytes, Absolute 01/10/2024 1.25     Monocytes, Absolute 01/10/2024 0.47     Eosinophils, Absolute 01/10/2024 0.05     Basophils, Absolute 01/10/2024 0.03     Immature Grans, Absolute 01/10/2024 0.01     nRBC  01/10/2024 0.0      Recent labs reviewed and interpreted for clinical significance and application            Level of Care:           Brandon Barrios MD  02/08/24  .

## 2024-03-22 ENCOUNTER — HOSPITAL ENCOUNTER (OUTPATIENT)
Dept: CT IMAGING | Facility: HOSPITAL | Age: 74
Discharge: HOME | End: 2024-03-22
Admitting: INTERNAL MEDICINE

## 2024-03-22 DIAGNOSIS — Z91.89 MULTIPLE RISK FACTORS FOR CORONARY ARTERY DISEASE: ICD-10-CM

## 2024-03-22 PROCEDURE — 75571 CT HRT W/O DYE W/CA TEST: CPT

## 2024-05-09 ENCOUNTER — TELEPHONE (OUTPATIENT)
Dept: CARDIOLOGY | Facility: CLINIC | Age: 74
End: 2024-05-09
Payer: MEDICARE

## 2024-06-07 ENCOUNTER — TELEPHONE (OUTPATIENT)
Dept: CARDIOLOGY | Facility: CLINIC | Age: 74
End: 2024-06-07
Payer: MEDICARE

## 2024-06-07 NOTE — TELEPHONE ENCOUNTER
Caller: Giovani Moseley    Relationship to patient: Self    Best call back number: [phone]    Patient is needing: PATIENT REQUESTING A 90 DAY OF NAXILEZET MEDICATION SENT TO [facility]. DO NOT SEE MEDICATION ON PATIENTS MEDICATION LIST.

## 2024-06-18 RX ORDER — SCOLOPAMINE TRANSDERMAL SYSTEM 1 MG/1
1 PATCH, EXTENDED RELEASE TRANSDERMAL
Qty: 10 PATCH | Refills: 1 | Status: SHIPPED | OUTPATIENT
Start: 2024-06-18

## 2024-11-22 RX ORDER — ROSUVASTATIN CALCIUM 5 MG/1
5 TABLET, COATED ORAL DAILY
Start: 2024-11-22

## 2025-01-07 NOTE — PROGRESS NOTES
"Chief Complaint  Sleep Apnea    Subjective          Giovani Moseley presents to [facility] NEUROLOGY  History of Present Illness    MIREYA, yearly f/u for CPAP compliance, patient doing well with pap therapy,  He uses a FFM and goes [facility] in [place] for supplies.  He takes doxepin 10 mg hs to help him sleep.     Sleep testing history:    First diagnosed in 2008 with ahi of 18  Repeat npsg in 2016 with ahi 2.4 and rdi 20    PAP download:2023      Ekron Sleepiness Scale:  Sitting and reading JS Ekron Sleepiness: 1 WatchingTV JS Ekron Sleepiness: 1  Sitting, inactive, in a public place JS Ekron Sleepiness: 0  As a passenger in a car for 1 hour w/o a break  JS Ekron Sleepiness: 0  Lying down to rest in the afternoon  JS Ekron Sleepiness: 0  Sitting and talking to someone  JS Ekron Sleepiness: 0  Sitting quietly after a lunch  JS Ekron Sleepiness: 1  In a car, while stopped for traffic or a light  JS Ekron Sleepiness: 0  Total 3    Review of Systems   Constitutional:  Negative for fatigue.   Respiratory:  Negative for apnea and shortness of breath.    Psychiatric/Behavioral:  Negative for sleep disturbance.          Objective   Vital Signs:   /72   Pulse 59   Resp 16   Ht 170.2 cm (67\")   Wt 90.7 kg (200 lb)   BMI 31.32 kg/m²     Physical Exam  Vitals reviewed.   Constitutional:       Appearance: Normal appearance.   Cardiovascular:      Rate and Rhythm: Normal rate.      Pulses: Normal pulses.   Neurological:      General: No focal deficit present.      Mental Status: He is alert and oriented to person, place, and time.   Psychiatric:         Mood and Affect: Mood normal.        Result Review :                 Assessment and Plan    Diagnoses and all orders for this visit:    1. Obstructive sleep apnea syndrome (Primary)  -     doxepin (SINEquan) 10 MG capsule; Take 1 capsule by mouth As Needed for Sleep. prn  Dispense: 90 capsule; Refill: 3    2. Primary " insomnia      Continue cpap     continue doxepin, discussed CBT with Ameya Angulo.     The patient is compliant with and benefiting from PAP therapy.      Follow Up   Return in about 1 year (around 1/9/2026).    Patient was given instructions and counseling regarding his condition or for health maintenance advice. Please see specific information pulled into the AVS if appropriate.       This document has been electronically signed by Joseph Seipel, MD on January 9, 2025 16:12 EST

## 2025-01-09 ENCOUNTER — OFFICE VISIT (OUTPATIENT)
Dept: NEUROLOGY | Facility: CLINIC | Age: 75
End: 2025-01-09
Payer: MEDICARE

## 2025-01-09 VITALS
RESPIRATION RATE: 16 BRPM | BODY MASS INDEX: 31.39 KG/M2 | SYSTOLIC BLOOD PRESSURE: 142 MMHG | HEIGHT: 67 IN | DIASTOLIC BLOOD PRESSURE: 72 MMHG | WEIGHT: 200 LBS | HEART RATE: 59 BPM

## 2025-01-09 DIAGNOSIS — F51.01 PRIMARY INSOMNIA: ICD-10-CM

## 2025-01-09 DIAGNOSIS — G47.33 OBSTRUCTIVE SLEEP APNEA SYNDROME: Primary | ICD-10-CM

## 2025-01-09 PROCEDURE — 1159F MED LIST DOCD IN RCRD: CPT | Performed by: PSYCHIATRY & NEUROLOGY

## 2025-01-09 PROCEDURE — 1160F RVW MEDS BY RX/DR IN RCRD: CPT | Performed by: PSYCHIATRY & NEUROLOGY

## 2025-01-09 PROCEDURE — 99214 OFFICE O/P EST MOD 30 MIN: CPT | Performed by: PSYCHIATRY & NEUROLOGY

## 2025-01-09 RX ORDER — VIBEGRON 75 MG/1
TABLET, FILM COATED ORAL
COMMUNITY
Start: 2024-11-21

## 2025-01-09 RX ORDER — TRAMADOL HYDROCHLORIDE 50 MG/1
TABLET ORAL
COMMUNITY

## 2025-01-09 RX ORDER — DOXEPIN HYDROCHLORIDE 10 MG/1
10 CAPSULE ORAL AS NEEDED
Qty: 90 CAPSULE | Refills: 3 | Status: SHIPPED | OUTPATIENT
Start: 2025-01-09

## 2025-01-14 ENCOUNTER — LAB (OUTPATIENT)
Dept: FAMILY MEDICINE CLINIC | Facility: CLINIC | Age: 75
End: 2025-01-14
Payer: MEDICARE

## 2025-01-14 ENCOUNTER — OFFICE VISIT (OUTPATIENT)
Dept: FAMILY MEDICINE CLINIC | Facility: CLINIC | Age: 75
End: 2025-01-14
Payer: MEDICARE

## 2025-01-14 VITALS
HEIGHT: 67 IN | TEMPERATURE: 97.3 F | HEART RATE: 50 BPM | WEIGHT: 194 LBS | DIASTOLIC BLOOD PRESSURE: 78 MMHG | SYSTOLIC BLOOD PRESSURE: 131 MMHG | OXYGEN SATURATION: 96 % | BODY MASS INDEX: 30.45 KG/M2 | RESPIRATION RATE: 18 BRPM

## 2025-01-14 DIAGNOSIS — E78.2 MIXED HYPERLIPIDEMIA: ICD-10-CM

## 2025-01-14 DIAGNOSIS — Z12.5 SCREENING FOR PROSTATE CANCER: ICD-10-CM

## 2025-01-14 DIAGNOSIS — Z00.00 MEDICARE ANNUAL WELLNESS VISIT, SUBSEQUENT: Primary | ICD-10-CM

## 2025-01-14 DIAGNOSIS — R25.2 MUSCLE CRAMP: ICD-10-CM

## 2025-01-14 DIAGNOSIS — R73.9 HYPERGLYCEMIA: ICD-10-CM

## 2025-01-14 DIAGNOSIS — I10 PRIMARY HYPERTENSION: ICD-10-CM

## 2025-01-14 DIAGNOSIS — Z87.891 FORMER SMOKER: ICD-10-CM

## 2025-01-14 LAB
ALBUMIN SERPL-MCNC: 4.6 G/DL (ref 3.5–5.2)
ALBUMIN/GLOB SERPL: 2 G/DL
ALP SERPL-CCNC: 39 U/L (ref 39–117)
ALT SERPL W P-5'-P-CCNC: 21 U/L (ref 1–41)
ANION GAP SERPL CALCULATED.3IONS-SCNC: 8.3 MMOL/L (ref 5–15)
AST SERPL-CCNC: 24 U/L (ref 1–40)
BASOPHILS # BLD AUTO: 0.03 10*3/MM3 (ref 0–0.2)
BASOPHILS NFR BLD AUTO: 0.7 % (ref 0–1.5)
BILIRUB SERPL-MCNC: 1.1 MG/DL (ref 0–1.2)
BUN SERPL-MCNC: 13 MG/DL (ref 8–23)
BUN/CREAT SERPL: 14.6 (ref 7–25)
CALCIUM SPEC-SCNC: 9.7 MG/DL (ref 8.6–10.5)
CHLORIDE SERPL-SCNC: 102 MMOL/L (ref 98–107)
CHOLEST SERPL-MCNC: 139 MG/DL (ref 0–200)
CO2 SERPL-SCNC: 28.7 MMOL/L (ref 22–29)
CREAT SERPL-MCNC: 0.89 MG/DL (ref 0.76–1.27)
DEPRECATED RDW RBC AUTO: 45.9 FL (ref 37–54)
EGFRCR SERPLBLD CKD-EPI 2021: 89.9 ML/MIN/1.73
EOSINOPHIL # BLD AUTO: 0.08 10*3/MM3 (ref 0–0.4)
EOSINOPHIL NFR BLD AUTO: 1.8 % (ref 0.3–6.2)
ERYTHROCYTE [DISTWIDTH] IN BLOOD BY AUTOMATED COUNT: 12.7 % (ref 12.3–15.4)
GLOBULIN UR ELPH-MCNC: 2.3 GM/DL
GLUCOSE SERPL-MCNC: 95 MG/DL (ref 65–99)
HBA1C MFR BLD: 5.7 % (ref 4.8–5.6)
HCT VFR BLD AUTO: 46.1 % (ref 37.5–51)
HDLC SERPL-MCNC: 56 MG/DL (ref 40–60)
HGB BLD-MCNC: 15.5 G/DL (ref 13–17.7)
IMM GRANULOCYTES # BLD AUTO: 0.02 10*3/MM3 (ref 0–0.05)
IMM GRANULOCYTES NFR BLD AUTO: 0.4 % (ref 0–0.5)
LDLC SERPL CALC-MCNC: 69 MG/DL (ref 0–100)
LDLC/HDLC SERPL: 1.23 {RATIO}
LYMPHOCYTES # BLD AUTO: 1.48 10*3/MM3 (ref 0.7–3.1)
LYMPHOCYTES NFR BLD AUTO: 32.4 % (ref 19.6–45.3)
MAGNESIUM SERPL-MCNC: 2.3 MG/DL (ref 1.6–2.4)
MCH RBC QN AUTO: 33.5 PG (ref 26.6–33)
MCHC RBC AUTO-ENTMCNC: 33.6 G/DL (ref 31.5–35.7)
MCV RBC AUTO: 99.6 FL (ref 79–97)
MONOCYTES # BLD AUTO: 0.59 10*3/MM3 (ref 0.1–0.9)
MONOCYTES NFR BLD AUTO: 12.9 % (ref 5–12)
NEUTROPHILS NFR BLD AUTO: 2.37 10*3/MM3 (ref 1.7–7)
NEUTROPHILS NFR BLD AUTO: 51.8 % (ref 42.7–76)
NRBC BLD AUTO-RTO: 0 /100 WBC (ref 0–0.2)
PLATELET # BLD AUTO: 259 10*3/MM3 (ref 140–450)
PMV BLD AUTO: 10 FL (ref 6–12)
POTASSIUM SERPL-SCNC: 4.4 MMOL/L (ref 3.5–5.2)
PROT SERPL-MCNC: 6.9 G/DL (ref 6–8.5)
PSA SERPL-MCNC: 3.06 NG/ML (ref 0–4)
RBC # BLD AUTO: 4.63 10*6/MM3 (ref 4.14–5.8)
SODIUM SERPL-SCNC: 139 MMOL/L (ref 136–145)
TRIGL SERPL-MCNC: 71 MG/DL (ref 0–150)
VLDLC SERPL-MCNC: 14 MG/DL (ref 5–40)
WBC NRBC COR # BLD AUTO: 4.57 10*3/MM3 (ref 3.4–10.8)

## 2025-01-14 PROCEDURE — 1160F RVW MEDS BY RX/DR IN RCRD: CPT | Performed by: FAMILY MEDICINE

## 2025-01-14 PROCEDURE — G0439 PPPS, SUBSEQ VISIT: HCPCS | Performed by: FAMILY MEDICINE

## 2025-01-14 PROCEDURE — 36415 COLL VENOUS BLD VENIPUNCTURE: CPT | Performed by: FAMILY MEDICINE

## 2025-01-14 PROCEDURE — 3078F DIAST BP <80 MM HG: CPT | Performed by: FAMILY MEDICINE

## 2025-01-14 PROCEDURE — 85025 COMPLETE CBC W/AUTO DIFF WBC: CPT | Performed by: FAMILY MEDICINE

## 2025-01-14 PROCEDURE — 83036 HEMOGLOBIN GLYCOSYLATED A1C: CPT | Performed by: FAMILY MEDICINE

## 2025-01-14 PROCEDURE — G0103 PSA SCREENING: HCPCS | Performed by: FAMILY MEDICINE

## 2025-01-14 PROCEDURE — 3075F SYST BP GE 130 - 139MM HG: CPT | Performed by: FAMILY MEDICINE

## 2025-01-14 PROCEDURE — 80053 COMPREHEN METABOLIC PANEL: CPT | Performed by: FAMILY MEDICINE

## 2025-01-14 PROCEDURE — 80061 LIPID PANEL: CPT | Performed by: FAMILY MEDICINE

## 2025-01-14 PROCEDURE — 1126F AMNT PAIN NOTED NONE PRSNT: CPT | Performed by: FAMILY MEDICINE

## 2025-01-14 PROCEDURE — 1159F MED LIST DOCD IN RCRD: CPT | Performed by: FAMILY MEDICINE

## 2025-01-14 PROCEDURE — 83735 ASSAY OF MAGNESIUM: CPT | Performed by: FAMILY MEDICINE

## 2025-01-14 NOTE — ASSESSMENT & PLAN NOTE
Hypertension is stable and controlled  Continue current treatment regimen.  Blood pressure will be reassessed in 1 year.    Orders:    Magnesium    Lipid Panel    Comprehensive Metabolic Panel    CBC & Differential

## 2025-01-14 NOTE — ASSESSMENT & PLAN NOTE
Orders:    Magnesium    Lipid Panel    Hemoglobin A1c    Comprehensive Metabolic Panel    CBC & Differential

## 2025-01-14 NOTE — PATIENT INSTRUCTIONS
Medicare Wellness  Personal Prevention Plan of Service     Date of Office Visit:    Encounter Provider:  Yvonne Santa MD  Place of Service:  [facility] FAMILY MEDICINE  Patient Name: Giovani Moseley  :  1950    As part of the Medicare Wellness portion of your visit today, we are providing you with this personalized preventive plan of services (PPPS). This plan is based upon recommendations of the [place] Preventive Services Task Force (USPSTF) and the Advisory Committee on Immunization Practices (ACIP).    This lists the preventive care services that should be considered, and provides dates of when you are due. Items listed as completed are up-to-date and do not require any further intervention.    Health Maintenance   Topic Date Due    Pneumococcal Vaccine 65+ (1 of 1 - PCV) Never done    LIPID PANEL  01/10/2025    ANNUAL WELLNESS VISIT  2026    BMI FOLLOWUP  2026    COLORECTAL CANCER SCREENING  2028    TDAP/TD VACCINES (2 - Td or Tdap) 10/06/2032    HEPATITIS C SCREENING  Completed    COVID-19 Vaccine  Completed    INFLUENZA VACCINE  Completed    AAA SCREEN ONCE  Completed    ZOSTER VACCINE  Completed       Orders Placed This Encounter   Procedures    XR Chest 2 View     Order Specific Question:   Reason for Exam:     Answer:   former smoker     Order Specific Question:   Release to patient     Answer:   Routine Release [[id number]]    Magnesium     Order Specific Question:   Release to patient     Answer:   Routine Release [[id number]]    PSA Screen     Order Specific Question:   Release to patient     Answer:   Routine Release [[id number]]    Lipid Panel     Order Specific Question:   Release to patient     Answer:   Routine Release [[id number]]    Hemoglobin A1c     Order Specific Question:   Release to patient     Answer:   Routine Release [[id number]]    Comprehensive Metabolic Panel     Order Specific Question:   Release to patient     Answer:   Routine  Release [[id number]]    CBC Auto Differential     Order Specific Question:   Release to patient     Answer:   Routine Release [[id number]]    CBC & Differential     Order Specific Question:   Manual Differential     Answer:   No     Order Specific Question:   Release to patient     Answer:   Routine Release [[id number]]       Return in about 1 year (around 1/14/2026) for Medicare Wellness.

## 2025-01-14 NOTE — ASSESSMENT & PLAN NOTE
Lipid abnormalities are stable    Plan:  Continue same medication/s without change.      Counseled patient on lifestyle modifications to help control hyperlipidemia.     Patient Treatment Goals:   LDL goal is under 100    Followup in 1 year.    Orders:    Lipid Panel    Comprehensive Metabolic Panel

## 2025-01-14 NOTE — PROGRESS NOTES
Subjective   The ABCs of the Annual Wellness Visit  Medicare Wellness Visit      Giovani Moseley is a 75 y.o. patient who presents for a Medicare Wellness Visit.    The following portions of the patient's history were reviewed and   updated as appropriate: allergies, current medications, past family history, past medical history, past social history, past surgical history, and problem list.    Compared to one year ago, the patient's physical   health is the same.  Compared to one year ago, the patient's mental   health is the same.    Recent Hospitalizations:  He was not admitted to the hospital during the last year.     Current Medical Providers:  Patient Care Team:  Yvonne Santa MD as PCP - General (Family Medicine)  Patrick Crane MD as Consulting Physician (Urology)  Seipel, Joseph F, MD as Consulting Physician (Sleep Medicine)  Hollis Redd MD as Consulting Physician (Cardiology)  Alexey Stuart MD as Consulting Physician (Ophthalmology)    Outpatient Medications Prior to Visit   Medication Sig Dispense Refill    Bempedoic Acid-Ezetimibe 180-10 MG tablet Take 1 tablet by mouth Daily. 90 tablet 3    Coenzyme Q10 (CoQ10) 100 MG capsule       doxepin (SINEquan) 10 MG capsule Take 1 capsule by mouth As Needed for Sleep. prn 90 capsule 3    losartan (Cozaar) 25 MG tablet Take 1 tablet by mouth Daily. 90 tablet 3    silodosin (RAPAFLO) 8 MG capsule capsule TAKE ONE (1) CAPSULE BY MOUTH EVERY DAY      traMADol (ULTRAM) 50 MG tablet Take 1 tablet every 6 hours by oral route as needed, for acute post-op pain.      Vibegron (Gemtesa) 75 MG tablet        No facility-administered medications prior to visit.     Opioid medication/s are on active medication list.  and I have evaluated his active treatment plan and pain score trends (see table).  Vitals:    01/14/25 0817   PainSc: 0-No pain     I have reviewed the chart for potential of high risk medication and harmful drug interactions in the  "elderly.        Aspirin is not on active medication list.  Aspirin use is not indicated based on review of current medical condition/s. Risk of harm outweighs potential benefits.  .    Patient Active Problem List   Diagnosis    Hyperlipidemia    Benign localized hyperplasia of prostate    Obstructive sleep apnea syndrome    Metabolic syndrome    AAA (abdominal aortic aneurysm) without rupture    Medicare annual wellness visit, subsequent    Screening for prostate cancer    Weakness of right hand    DDD (degenerative disc disease), cervical    Primary insomnia    Muscle cramp     Advance Care Planning Advance Directive is on file.  ACP discussion was held with the patient during this visit. Patient has an advance directive in EMR which is still valid.             Objective   Vitals:    01/14/25 0817   BP: 131/78   BP Location: Left arm   Patient Position: Sitting   Cuff Size: Large Adult   Pulse: 50   Resp: 18   Temp: 97.3 °F (36.3 °C)   TempSrc: Temporal   SpO2: 96%   Weight: 88 kg (194 lb)   Height: 170.2 cm (67\")   PainSc: 0-No pain       Estimated body mass index is 30.38 kg/m² as calculated from the following:    Height as of this encounter: 170.2 cm (67\").    Weight as of this encounter: 88 kg (194 lb).    BMI is >= 30 and <35. (Class 1 Obesity). The following options were offered after discussion;: exercise counseling/recommendations       Physical Exam  Vitals and nursing note reviewed.   Constitutional:       General: He is not in acute distress.     Appearance: He is well-developed.   HENT:      Head: Normocephalic.   Eyes:      General: Lids are normal.      Conjunctiva/sclera: Conjunctivae normal.   Neck:      Thyroid: No thyroid mass or thyromegaly.      Trachea: Trachea normal.   Cardiovascular:      Rate and Rhythm: Normal rate and regular rhythm.      Heart sounds: Normal heart sounds.   Pulmonary:      Effort: Pulmonary effort is normal.      Breath sounds: Normal breath sounds.   Musculoskeletal:    "   Cervical back: Normal range of motion.      Right lower leg: No edema.      Left lower leg: No edema.   Lymphadenopathy:      Cervical: No cervical adenopathy.   Skin:     General: Skin is warm and dry.   Neurological:      Mental Status: He is alert and oriented to person, place, and time.   Psychiatric:         Attention and Perception: He is attentive.         Mood and Affect: Mood normal.         Speech: Speech normal.         Behavior: Behavior normal.           Does the patient have evidence of cognitive impairment? No                                                                                               Health  Risk Assessment    Smoking Status:  Social History     Tobacco Use   Smoking Status Former    Current packs/day: 0.00    Average packs/day: 1 pack/day for 35.0 years (35.0 ttl pk-yrs)    Types: Cigarettes    Start date: 1965    Quit date: 2000    Years since quittin.0    Passive exposure: Past   Smokeless Tobacco Never   Tobacco Comments    Stopped roughly in beginning of . light to moderate smoker throughout my history     Alcohol Consumption:  Social History     Substance and Sexual Activity   Alcohol Use Yes    Alcohol/week: 1.0 standard drink of alcohol    Types: 1 Cans of beer per week    Comment: light drinker when socializing       Fall Risk Screen  STEADI Fall Risk Assessment was completed, and patient is at LOW risk for falls.Assessment completed on:2025    Depression Screening   Little interest or pleasure in doing things? Not at all   Feeling down, depressed, or hopeless? Not at all   PHQ-2 Total Score 0      Health Habits and Functional and Cognitive Screenin/7/2025    11:19 AM   Functional & Cognitive Status   Do you have difficulty preparing food and eating? No    Do you have difficulty bathing yourself, getting dressed or grooming yourself? No    Do you have difficulty using the toilet? No    Do you have difficulty moving around from place to  place? No    Do you have trouble with steps or getting out of a bed or a chair? No    Current Diet Well Balanced Diet    Dental Exam Up to date    Eye Exam Up to date    Exercise (times per week) 5 times per week    Current Exercises Include Bicycling Outdoors;Cardiovascular Workout;Home Exercise Program (TV, Computer, Etc.);House Cleaning;Light Weights;Stationary Bicycling/Spin Class;Treadmill;Weightlifting    Do you need help using the phone?  No    Are you deaf or do you have serious difficulty hearing?  No    Do you need help to go to places out of walking distance? No    Do you need help shopping? No    Do you need help preparing meals?  No    Do you need help with housework?  No    Do you need help with laundry? No    Do you need help taking your medications? No    Do you need help managing money? No    Do you ever drive or ride in a car without wearing a seat belt? No    Have you felt unusual stress, anger or loneliness in the last month? No    Who do you live with? Spouse    If you need help, do you have trouble finding someone available to you? No    Have you been bothered in the last four weeks by sexual problems? No    Do you have difficulty concentrating, remembering or making decisions? No        Patient-reported           Age-appropriate Screening Schedule:  Refer to the list below for future screening recommendations based on patient's age, sex and/or medical conditions. Orders for these recommended tests are listed in the plan section. The patient has been provided with a written plan.    Health Maintenance List  Health Maintenance   Topic Date Due    Pneumococcal Vaccine 65+ (1 of 1 - PCV) Never done    LIPID PANEL  01/10/2025    BMI FOLLOWUP  01/10/2025    ANNUAL WELLNESS VISIT  01/14/2026    TDAP/TD VACCINES (2 - Td or Tdap) 10/06/2032    COLORECTAL CANCER SCREENING  12/01/2033    HEPATITIS C SCREENING  Completed    COVID-19 Vaccine  Completed    INFLUENZA VACCINE  Completed    AAA SCREEN ONCE   Completed    ZOSTER VACCINE  Completed                                                                                                                                                CMS Preventative Services Quick Reference  Risk Factors Identified During Encounter  Inactivity/Sedentary: Patient was advised to exercise at least 150 minutes a week per CDC recommendations.  Dental Screening Recommended  Vision Screening Recommended    The above risks/problems have been discussed with the patient.  Pertinent information has been shared with the patient in the After Visit Summary.  An After Visit Summary and PPPS were made available to the patient.    Follow Up:   Next Medicare Wellness visit to be scheduled in 1 year.     Assessment & Plan  Medicare annual wellness visit, subsequent         Muscle cramp    Orders:    Magnesium    Lipid Panel    Hemoglobin A1c    Comprehensive Metabolic Panel    CBC & Differential    Screening for prostate cancer    Orders:    PSA Screen    Primary hypertension  Hypertension is stable and controlled  Continue current treatment regimen.  Blood pressure will be reassessed in 1 year.    Orders:    Magnesium    Lipid Panel    Comprehensive Metabolic Panel    CBC & Differential    Mixed hyperlipidemia   Lipid abnormalities are stable    Plan:  Continue same medication/s without change.      Counseled patient on lifestyle modifications to help control hyperlipidemia.     Patient Treatment Goals:   LDL goal is under 100    Followup in 1 year.    Orders:    Lipid Panel    Comprehensive Metabolic Panel    Hyperglycemia    Orders:    Hemoglobin A1c    Comprehensive Metabolic Panel         Follow Up:   No follow-ups on file.

## 2025-01-15 ENCOUNTER — TELEPHONE (OUTPATIENT)
Dept: NEUROLOGY | Facility: CLINIC | Age: 75
End: 2025-01-15
Payer: MEDICARE

## 2025-01-15 NOTE — TELEPHONE ENCOUNTER
Giovani Moseley  Telephone Information:   Mobile [phone]     PT STATES HE USUALLY RECEIVES HIS SLEEP SUPPLY THROUGH [facility], HE RECEIVED A CALL TODAY FROM [phone] ([employer]) STATING THEY ARE THE DME COMPANY THE SUPPLIES WERE SENT TO THIS TIME, HE STATES CAN THE ORDER BE SENT TO THE CORRECT DME COMPANY PLEASE.     HE ALSO STATES [facility] ADVISED THEY NEED CLARIFICATIONS ON doxepin (SINEquan) 10 MG capsule  IN ORDER TO FILL THE PRESCRIPTION.

## 2025-01-17 NOTE — TELEPHONE ENCOUNTER
[facility]AP [facility] as requested today.   Called and spoke to Jaymie and they have already filled RX and pt is aware    HUB ok to relay

## 2025-01-24 ENCOUNTER — PATIENT ROUNDING (BHMG ONLY) (OUTPATIENT)
Dept: FAMILY MEDICINE CLINIC | Facility: CLINIC | Age: 75
End: 2025-01-24
Payer: MEDICARE

## 2025-02-17 ENCOUNTER — HOSPITAL ENCOUNTER (OUTPATIENT)
Dept: GENERAL RADIOLOGY | Facility: HOSPITAL | Age: 75
Discharge: HOME | End: 2025-02-17
Admitting: FAMILY MEDICINE
Payer: MEDICARE

## 2025-02-17 DIAGNOSIS — I10 PRIMARY HYPERTENSION: ICD-10-CM

## 2025-02-17 PROCEDURE — 71046 X-RAY EXAM CHEST 2 VIEWS: CPT

## 2025-02-18 RX ORDER — LOSARTAN POTASSIUM 25 MG/1
TABLET ORAL
Qty: 90 TABLET | Refills: 3 | Status: SHIPPED | OUTPATIENT
Start: 2025-02-18 | End: 2025-02-20 | Stop reason: SDUPTHER

## 2025-02-20 ENCOUNTER — OFFICE VISIT (OUTPATIENT)
Dept: CARDIOLOGY | Facility: CLINIC | Age: 75
End: 2025-02-20
Payer: MEDICARE

## 2025-02-20 VITALS
WEIGHT: 197 LBS | BODY MASS INDEX: 30.92 KG/M2 | SYSTOLIC BLOOD PRESSURE: 131 MMHG | HEIGHT: 67 IN | DIASTOLIC BLOOD PRESSURE: 77 MMHG | OXYGEN SATURATION: 94 % | HEART RATE: 63 BPM | RESPIRATION RATE: 18 BRPM

## 2025-02-20 DIAGNOSIS — I10 PRIMARY HYPERTENSION: ICD-10-CM

## 2025-02-20 RX ORDER — LOSARTAN POTASSIUM 25 MG/1
25 TABLET ORAL DAILY
Qty: 90 TABLET | Refills: 3 | Status: SHIPPED | OUTPATIENT
Start: 2025-02-20

## 2025-02-24 NOTE — PROGRESS NOTES
Cardiology Clinic Note  Brandon Barrios MD, PhD    Subjective:     Encounter Date:02/20/2025      Patient ID: Giovani Moseley is a 75 y.o. male.    Chief Complaint:  Chief Complaint   Patient presents with    Follow-up       HPI:        At the pleasure to see this 75-year-old gentleman in follow-up.  He has a history of hyperlipidemia and obstructive sleep apnea.  He had an echo in 2019 which demonstrated normal EF.  He has a history of AAA without rupture mixed hyperlipidemia prediabetes per his EMR that I reviewed.  He was under evaluation for carotid /aortic renal disease, he was found to have mild plaquing in the carotid arteries , prior aortic ultrasound did not reveal significantly enlarged aorta maximally 2.0 cm,  also revealed mild plaquing.  He has no symptoms of abdominal pain presyncope palpitations heart failure or anginal chest pain at this time.  He quit smoking remotely 1995.  He is not diabetic and he is not hypertensive today.  BMI under 30  Today on repeat follow-up, he had a calcium score of 87 mild risk for CV events.  His LDL is 69 which is at goal on current pharmacotherapy.  He denies any new episode of chest pain shortness of breath or heart failure.  No unexplained syncope.   His blood pressure and heart rate are at goal and his weight is essentially identical to prior visit.  We discussed diet exercise healthy activity and heart healthy lifestyle.  He is on primary prevention goals, continues with afterload reduction with general losartan and lipid reduction with bempedoic acid and Zetia     Review of systems otherwise negative x14 point review of systems except was mentioned above     Historical data copied forward from previous encounters in EMR including the history, exam, and assessment/plan has been reviewed and is unchanged unless noted otherwise.     Cardiac medicines reviewed with risk, benefits, and necessity of each discussed.     Risk and benefit of cardiac testing reviewed including  "death heart attack stroke pain bleeding infection need for vascular /cardiovascular surgery were discussed and the patient      Objective:      Vitals reviewed below     Physical Exam  Regular rate and rhythm no rubs gallops heave or lift  No new murmurs  No clubbing cyanosis or edema  Normal pulses normal cap refill  Intact grossly  Clear to auscultation  Unchanged from prior encounter  Assessment:      Independently manage medical conditions today  History of AAA without rupture  Essential hypertension  Hyperlipidemia  Risk factors for coronary disease  Preventative health care  Abnormal calcium score, mild risk CV events total score of 87     Medical history in EMR remarks about AAA without rupture, no abdominal pain, repeat abdominal ultrasound in 1 to 2 years with smoking history versus abdominal CT     Blood pressure is well-controlled today 130 systolic  Lipids are controlled on low-dose rosuvastatin, co-Q10, no aspirin on board  No history of coronary disease, mildly elevated calcium score of 87  His lipids are at goal on present pharmacotherapy, LDL most recently of 69  Continue lipid-lowering therapy at present doses  No anginal  No heart failure signs or symptoms  No indication for stress testing at this time  Primary prevention goals for CAD and peripheral vascular disease     Back to clinic 1 year sooner if needed  Follow-up yearly labs  Refills ordered        Non-smoker presently, continue abstinence  It is a pleasure to be involved in his cardiovascular care.  Please call with any questions or concerns    Brandon Barrios MD, PhD    Objective:         /77 (BP Location: Right arm, Patient Position: Sitting)   Pulse 63   Resp 18   Ht 170.2 cm (67\")   Wt 89.4 kg (197 lb)   SpO2 94%   BMI 30.85 kg/m²         Diagnoses and all orders for this visit:    1. Primary hypertension  -     losartan (COZAAR) 25 MG tablet; Take 1 tablet by mouth Daily.  Dispense: 90 tablet; Refill: 3    Other orders  -    "  Bempedoic Acid-Ezetimibe 180-10 MG tablet; Take 1 tablet by mouth Daily.  Dispense: 90 tablet; Refill: 3              The pleasure to be involved in this patient's cardiovascular care.  Please call with any questions or concerns  Brandon Barrios MD, PhD    Most recent EKG as reviewed and interpreted by me:    ECG 12 Lead    Date/Time: 2/20/2025 11:09 AM  Performed by: Brandon Barrios MD    Authorized by: Brandon Barrios MD  Comparison: not compared with previous ECG   Previous ECG: no previous ECG available    Clinical impression: abnormal EKG  Comments: Abnormal EKG  Sinus bradycardia  Left anterior fascicular block  Poor R wave progression throughout the anterolateral leads              Most recent echo as reviewed and interpreted by me:  Results for orders placed during the hospital encounter of 11/13/19    Adult Transthoracic Echo Complete W/ Cont if Necessary Per Protocol    Interpretation Summary  · Left ventricular systolic function is normal. Calculated EF = 69.0%. Estimated EF was in agreement with the calculated EF. Normal left ventricular cavity size noted. Left ventricular wall thickness is consistent with mild concentric hypertrophy. Left ventricular diastolic function is normal.  · The valve appears trileaflet. The aortic valve is abnormal in structure. There is mild calcification of the aortic valve mainly affecting the non, left and right coronary cusp(s).Trace-to-mild aortic valve regurgitation is present. No aortic valve stenosis is present.  · The mitral valve is grossly normal in structure. Mild mitral valve regurgitation is present.  · The tricuspid valve is grossly normal. Mild tricuspid valve regurgitation is present. Estimated right ventricular systolic pressure from tricuspid regurgitation is normal (<35 mmHg).      Most recent stress test as reviewed and interpreted by me:      Most recent cardiac catheterization as reviewed interpreted by me:  No results found for this or  any previous visit.    The following portions of the patient's history were reviewed and updated as appropriate: allergies, current medications, past family history, past medical history, past social history, past surgical history, and problem list.      ROS:  14 point review of systems negative except as mentioned above    Current Outpatient Medications:     Bempedoic Acid-Ezetimibe 180-10 MG tablet, Take 1 tablet by mouth Daily., Disp: 90 tablet, Rfl: 3    Coenzyme Q10 (CoQ10) 100 MG capsule, , Disp: , Rfl:     doxepin (SINEquan) 10 MG capsule, Take 1 capsule by mouth As Needed for Sleep. prn, Disp: 90 capsule, Rfl: 3    losartan (COZAAR) 25 MG tablet, Take 1 tablet by mouth Daily., Disp: 90 tablet, Rfl: 3    silodosin (RAPAFLO) 8 MG capsule capsule, TAKE ONE (1) CAPSULE BY MOUTH EVERY DAY, Disp: , Rfl:     Vibegron (Gemtesa) 75 MG tablet, , Disp: , Rfl:     Problem List:  Patient Active Problem List   Diagnosis    Hyperlipidemia    Benign localized hyperplasia of prostate    Obstructive sleep apnea syndrome    Metabolic syndrome    AAA (abdominal aortic aneurysm) without rupture    Medicare annual wellness visit, subsequent    Screening for prostate cancer    Weakness of right hand    DDD (degenerative disc disease), cervical    Primary insomnia    Muscle cramp    Former smoker    Hyperglycemia    Primary hypertension     Past Medical History:  Past Medical History:   Diagnosis Date    AAA (abdominal aortic aneurysm) without rupture 03/31/2021    Abnormal ECG 01/18/2019    I believe past submitted records reflect such    Benign prostate hyperplasia     Cataract     Congenital heart 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    S/A    Coronary artery 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    S/A    Hyperlipidemia     Hypertension 01/10/2024    Dr. Yvonne Santa    Metabolic syndrome 03/31/2021    Sleep apnea 01/31/2008    conflicting opinions from different specialists in 2008 and    Tremor 11/2023    Rsting tremor/ Dr. Stephens     Past Surgical History:  Past  Surgical History:   Procedure Laterality Date    CARPAL TUNNEL RELEASE Right     Dr. Stephens    COLONOSCOPY      EYE SURGERY Bilateral 10/3 and 10/15    PROSTATE SURGERY  Dr. Payne ([place])    2019    TONSILLECTOMY       Social History:  Social History     Socioeconomic History    Marital status:    Tobacco Use    Smoking status: Former     Current packs/day: 0.00     Average packs/day: 1 pack/day for 35.0 years (35.0 ttl pk-yrs)     Types: Cigarettes     Start date: 1965     Quit date: 2000     Years since quittin.1     Passive exposure: Past    Smokeless tobacco: Never    Tobacco comments:     Stopped roughly in beginning of . light to moderate smoker throughout my history   Vaping Use    Vaping status: Never Used   Substance and Sexual Activity    Alcohol use: Yes     Alcohol/week: 1.0 standard drink of alcohol     Types: 1 Cans of beer per week     Comment: light drinker when socializing    Drug use: No    Sexual activity: Not Currently     Allergies:  No Known Allergies  Immunizations:  Immunization History   Administered Date(s) Administered    ABRYSVO (RSV, 60+ or pregnant women 32-36 wks) 10/19/2023    COVID-19 (PFIZER) 12YRS+ (COMIRNATY) 2023    COVID-19 (PFIZER) Purple Cap Monovalent 2021, 2021, 2021    COVID-19 (UNSPECIFIED) 2024    Flu Vaccine Quad PF >36MO 2020    Fluad Quad 65+ 2020, 10/06/2021    Fluzone High-Dose 65+YRS 10/08/2018, 2019    Influenza Injectable Mdck Pf Quad 10/04/2023    Influenza, Unspecified 2021, 09/15/2023    Shingrix 10/18/2019, 2019    Tdap 10/06/2022            In-Office Procedure(s):  No orders to display        ASCVD RIsk Score::  The 10-year ASCVD risk score (Jhon MCKEON, et al., 2019) is: 23.7%    Values used to calculate the score:      Age: 75 years      Sex: Male      Is Non- : No      Diabetic: No      Tobacco smoker: No      Systolic Blood Pressure: 131  mmHg      Is BP treated: Yes      HDL Cholesterol: 56 mg/dL      Total Cholesterol: 139 mg/dL    Imaging:    Results for orders placed in visit on 01/14/25    XR Chest 2 View    Narrative  XR CHEST 2 VW    Date of Exam: 2/17/2025 12:16 PM EST    Indication: former smoker    Comparison: 1/15/2024    Findings:  Heart size and pulmonary vasculature within normal limits. Hilar and mediastinal contours normal. Lungs clear. Costophrenic angle sharp    Impression  Impression:  No active cardiopulmonary disease      Electronically Signed: Rober Bonilla  2/19/2025 3:11 PM EST  Workstation ID: [id number]       Results for orders placed during the hospital encounter of 03/22/24    CT Cardiac Calcium Score Without Dye    Narrative  CT CARDIAC CALCIUM SCORE WO DYE    Date of Exam: 3/22/2024 12:04 PM EDT    Indication: CAD screening, intermediate CAD risk, treadmill candidate.    Comparison: None available.    Technique: Multiple thin section CT axial images were obtained with prospective ECG-gating without intravenous contrast.    Findings:  Agatston scores for individual coronary arteries are as follows:  Left main (LM): 0  Left anterior descending (LAD): 49.6  Left circumflex (CX): 0  Right coronary artery (RCA): 38.3  Total: 87.9    Heart size is within normal limits. The imaged lungs appear clear. Included upper abdominal organs have a normal noncontrast appearance. No suspicious osseous abnormality.    Impression  Impression:    1. Total calcium score 87.9.    Calcium Score Interpretation  0 -- Negative examination, no identifiable atherosclerotic plaque.  Very low risk of cardiac events in the next 5 years.  1-10 -- Minimal plaque burden.  Low risk of cardiac events in the next 5 years.  11- 100 -- Mild Plaque burden.  Mild risk of cardiac events in the next 5 years.  101 - 400 -- Moderate plaque burden.  Moderate risk of cardiac events in the next 5 years.  Over 400 -- Extensive plaque burden.  High risk of cardiac events  in the next 5 years.      Electronically Signed: Yoana Interiano MD  3/22/2024 4:55 PM EDT  Workstation ID: [id number]      Results for orders placed during the hospital encounter of 03/22/24    CT Cardiac Calcium Score Without Dye    Narrative  CT CARDIAC CALCIUM SCORE WO DYE    Date of Exam: 3/22/2024 12:04 PM EDT    Indication: CAD screening, intermediate CAD risk, treadmill candidate.    Comparison: None available.    Technique: Multiple thin section CT axial images were obtained with prospective ECG-gating without intravenous contrast.    Findings:  Agatston scores for individual coronary arteries are as follows:  Left main (LM): 0  Left anterior descending (LAD): 49.6  Left circumflex (CX): 0  Right coronary artery (RCA): 38.3  Total: 87.9    Heart size is within normal limits. The imaged lungs appear clear. Included upper abdominal organs have a normal noncontrast appearance. No suspicious osseous abnormality.    Impression  Impression:    1. Total calcium score 87.9.    Calcium Score Interpretation  0 -- Negative examination, no identifiable atherosclerotic plaque.  Very low risk of cardiac events in the next 5 years.  1-10 -- Minimal plaque burden.  Low risk of cardiac events in the next 5 years.  11- 100 -- Mild Plaque burden.  Mild risk of cardiac events in the next 5 years.  101 - 400 -- Moderate plaque burden.  Moderate risk of cardiac events in the next 5 years.  Over 400 -- Extensive plaque burden.  High risk of cardiac events in the next 5 years.      Electronically Signed: Yoana Interiano MD  3/22/2024 4:55 PM EDT  Workstation ID: [id number]      Lab Review:   Office Visit on 01/14/2025   Component Date Value    Magnesium 01/14/2025 2.3     PSA 01/14/2025 3.060     Total Cholesterol 01/14/2025 139     Triglycerides 01/14/2025 71     HDL Cholesterol 01/14/2025 56     LDL Cholesterol  01/14/2025 69     VLDL Cholesterol 01/14/2025 14     LDL/HDL Ratio 01/14/2025 1.23     Hemoglobin A1C 01/14/2025 5.70 (H)      Glucose 01/14/2025 95     BUN 01/14/2025 13     Creatinine 01/14/2025 0.89     Sodium 01/14/2025 139     Potassium 01/14/2025 4.4     Chloride 01/14/2025 102     CO2 01/14/2025 28.7     Calcium 01/14/2025 9.7     Total Protein 01/14/2025 6.9     Albumin 01/14/2025 4.6     ALT (SGPT) 01/14/2025 21     AST (SGOT) 01/14/2025 24     Alkaline Phosphatase 01/14/2025 39     Total Bilirubin 01/14/2025 1.1     Globulin 01/14/2025 2.3     A/G Ratio 01/14/2025 2.0     BUN/Creatinine Ratio 01/14/2025 14.6     Anion Gap 01/14/2025 8.3     eGFR 01/14/2025 89.9     WBC 01/14/2025 4.57     RBC 01/14/2025 4.63     Hemoglobin 01/14/2025 15.5     Hematocrit 01/14/2025 46.1     MCV 01/14/2025 99.6 (H)     MCH 01/14/2025 33.5 (H)     MCHC 01/14/2025 33.6     RDW 01/14/2025 12.7     RDW-SD 01/14/2025 45.9     MPV 01/14/2025 10.0     Platelets 01/14/2025 259     Neutrophil % 01/14/2025 51.8     Lymphocyte % 01/14/2025 32.4     Monocyte % 01/14/2025 12.9 (H)     Eosinophil % 01/14/2025 1.8     Basophil % 01/14/2025 0.7     Immature Grans % 01/14/2025 0.4     Neutrophils, Absolute 01/14/2025 2.37     Lymphocytes, Absolute 01/14/2025 1.48     Monocytes, Absolute 01/14/2025 0.59     Eosinophils, Absolute 01/14/2025 0.08     Basophils, Absolute 01/14/2025 0.03     Immature Grans, Absolute 01/14/2025 0.02     nRBC 01/14/2025 0.0      Recent labs reviewed and interpreted for clinical significance and application            Level of Care:           Brandon Barrios MD  02/24/25  .

## 2025-06-02 ENCOUNTER — TELEPHONE (OUTPATIENT)
Dept: CARDIOLOGY | Facility: CLINIC | Age: 75
End: 2025-06-02

## 2025-06-02 NOTE — TELEPHONE ENCOUNTER
Caller: Giovani Moseley    Relationship: Self    Best call back number: [phone]    PATIENT IS HAVING PRETTY CONSISTENT LEG CRAMPING, HE IS WANTING TO KNOW IF HE CAN CUT BACK ON THE NEXLIZET?

## 2025-07-08 ENCOUNTER — APPOINTMENT (OUTPATIENT)
Dept: GENERAL RADIOLOGY | Facility: HOSPITAL | Age: 75
DRG: 418 | End: 2025-07-08
Payer: MEDICARE

## 2025-07-08 ENCOUNTER — APPOINTMENT (OUTPATIENT)
Dept: CT IMAGING | Facility: HOSPITAL | Age: 75
DRG: 418 | End: 2025-07-08
Payer: MEDICARE

## 2025-07-08 ENCOUNTER — HOSPITAL ENCOUNTER (INPATIENT)
Facility: HOSPITAL | Age: 75
LOS: 4 days | Discharge: HOME | DRG: 418 | End: 2025-07-12
Attending: EMERGENCY MEDICINE | Admitting: HOSPITALIST
Payer: MEDICARE

## 2025-07-08 ENCOUNTER — APPOINTMENT (OUTPATIENT)
Dept: ULTRASOUND IMAGING | Facility: HOSPITAL | Age: 75
DRG: 418 | End: 2025-07-08
Payer: MEDICARE

## 2025-07-08 DIAGNOSIS — Z09 POSTOPERATIVE EXAMINATION: ICD-10-CM

## 2025-07-08 DIAGNOSIS — Z90.49 S/P LAPAROSCOPIC CHOLECYSTECTOMY: ICD-10-CM

## 2025-07-08 DIAGNOSIS — R10.13 EPIGASTRIC ABDOMINAL PAIN: Primary | ICD-10-CM

## 2025-07-08 DIAGNOSIS — K81.9 CHOLECYSTITIS: ICD-10-CM

## 2025-07-08 PROBLEM — I20.0 UNSTABLE ANGINA: Status: ACTIVE | Noted: 2025-07-08

## 2025-07-08 LAB
ANION GAP SERPL CALCULATED.3IONS-SCNC: 10.5 MMOL/L (ref 5–15)
BASOPHILS # BLD AUTO: 0.04 10*3/MM3 (ref 0–0.2)
BASOPHILS NFR BLD AUTO: 0.7 % (ref 0–1.5)
BUN SERPL-MCNC: 14.2 MG/DL (ref 8–23)
BUN/CREAT SERPL: 16.1 (ref 7–25)
CALCIUM SPEC-SCNC: 9.5 MG/DL (ref 8.6–10.5)
CHLORIDE SERPL-SCNC: 103 MMOL/L (ref 98–107)
CHOLEST SERPL-MCNC: 134 MG/DL (ref 0–200)
CO2 SERPL-SCNC: 26.5 MMOL/L (ref 22–29)
CREAT SERPL-MCNC: 0.88 MG/DL (ref 0.76–1.27)
DEPRECATED RDW RBC AUTO: 43.6 FL (ref 37–54)
EGFRCR SERPLBLD CKD-EPI 2021: 90.2 ML/MIN/1.73
EOSINOPHIL # BLD AUTO: 0.08 10*3/MM3 (ref 0–0.4)
EOSINOPHIL NFR BLD AUTO: 1.4 % (ref 0.3–6.2)
ERYTHROCYTE [DISTWIDTH] IN BLOOD BY AUTOMATED COUNT: 12.6 % (ref 12.3–15.4)
GEN 5 1HR TROPONIN T REFLEX: 8 NG/L
GLUCOSE SERPL-MCNC: 95 MG/DL (ref 65–99)
HBA1C MFR BLD: 5.81 % (ref 4.8–5.6)
HCT VFR BLD AUTO: 42.9 % (ref 37.5–51)
HDLC SERPL-MCNC: 47 MG/DL (ref 40–60)
HGB BLD-MCNC: 14.7 G/DL (ref 13–17.7)
HOLD SPECIMEN: NORMAL
IMM GRANULOCYTES # BLD AUTO: 0.02 10*3/MM3 (ref 0–0.05)
IMM GRANULOCYTES NFR BLD AUTO: 0.3 % (ref 0–0.5)
LDLC SERPL CALC-MCNC: 63 MG/DL (ref 0–100)
LDLC/HDLC SERPL: 1.25 {RATIO}
LYMPHOCYTES # BLD AUTO: 1.61 10*3/MM3 (ref 0.7–3.1)
LYMPHOCYTES NFR BLD AUTO: 27.2 % (ref 19.6–45.3)
MCH RBC QN AUTO: 32.4 PG (ref 26.6–33)
MCHC RBC AUTO-ENTMCNC: 34.3 G/DL (ref 31.5–35.7)
MCV RBC AUTO: 94.5 FL (ref 79–97)
MONOCYTES # BLD AUTO: 0.58 10*3/MM3 (ref 0.1–0.9)
MONOCYTES NFR BLD AUTO: 9.8 % (ref 5–12)
NEUTROPHILS NFR BLD AUTO: 3.58 10*3/MM3 (ref 1.7–7)
NEUTROPHILS NFR BLD AUTO: 60.6 % (ref 42.7–76)
NRBC BLD AUTO-RTO: 0 /100 WBC (ref 0–0.2)
PLATELET # BLD AUTO: 243 10*3/MM3 (ref 140–450)
PMV BLD AUTO: 9.4 FL (ref 6–12)
POTASSIUM SERPL-SCNC: 4 MMOL/L (ref 3.5–5.2)
RBC # BLD AUTO: 4.54 10*6/MM3 (ref 4.14–5.8)
SODIUM SERPL-SCNC: 140 MMOL/L (ref 136–145)
TRIGL SERPL-MCNC: 141 MG/DL (ref 0–150)
TROPONIN T NUMERIC DELTA: -1 NG/L
TROPONIN T SERPL HS-MCNC: 9 NG/L
TSH SERPL DL<=0.05 MIU/L-ACNC: 1.92 UIU/ML (ref 0.27–4.2)
VLDLC SERPL-MCNC: 24 MG/DL (ref 5–40)
WBC NRBC COR # BLD AUTO: 5.91 10*3/MM3 (ref 3.4–10.8)
WHOLE BLOOD HOLD COAG: NORMAL

## 2025-07-08 PROCEDURE — 25010000002 ENOXAPARIN PER 10 MG

## 2025-07-08 PROCEDURE — 25010000002 NITROGLYCERIN 200 MCG/ML SOLUTION: Performed by: EMERGENCY MEDICINE

## 2025-07-08 PROCEDURE — 83036 HEMOGLOBIN GLYCOSYLATED A1C: CPT

## 2025-07-08 PROCEDURE — 99285 EMERGENCY DEPT VISIT HI MDM: CPT

## 2025-07-08 PROCEDURE — 93010 ELECTROCARDIOGRAM REPORT: CPT | Performed by: INTERNAL MEDICINE

## 2025-07-08 PROCEDURE — 74177 CT ABD & PELVIS W/CONTRAST: CPT

## 2025-07-08 PROCEDURE — 80048 BASIC METABOLIC PNL TOTAL CA: CPT | Performed by: EMERGENCY MEDICINE

## 2025-07-08 PROCEDURE — 71275 CT ANGIOGRAPHY CHEST: CPT

## 2025-07-08 PROCEDURE — 25010000002 DIAZEPAM PER 5 MG

## 2025-07-08 PROCEDURE — 84443 ASSAY THYROID STIM HORMONE: CPT

## 2025-07-08 PROCEDURE — 85025 COMPLETE CBC W/AUTO DIFF WBC: CPT | Performed by: EMERGENCY MEDICINE

## 2025-07-08 PROCEDURE — 25010000002 ONDANSETRON PER 1 MG: Performed by: EMERGENCY MEDICINE

## 2025-07-08 PROCEDURE — 93005 ELECTROCARDIOGRAM TRACING: CPT

## 2025-07-08 PROCEDURE — 25010000002 DIAZEPAM PER 5 MG: Performed by: NURSE PRACTITIONER

## 2025-07-08 PROCEDURE — 25510000001 IOPAMIDOL PER 1 ML: Performed by: HOSPITALIST

## 2025-07-08 PROCEDURE — 25010000002 MORPHINE PER 10 MG

## 2025-07-08 PROCEDURE — 93005 ELECTROCARDIOGRAM TRACING: CPT | Performed by: EMERGENCY MEDICINE

## 2025-07-08 PROCEDURE — 80061 LIPID PANEL: CPT

## 2025-07-08 PROCEDURE — 71045 X-RAY EXAM CHEST 1 VIEW: CPT

## 2025-07-08 PROCEDURE — 25010000002 PROCHLORPERAZINE 10 MG/2ML SOLUTION

## 2025-07-08 PROCEDURE — 36415 COLL VENOUS BLD VENIPUNCTURE: CPT

## 2025-07-08 PROCEDURE — 84484 ASSAY OF TROPONIN QUANT: CPT | Performed by: EMERGENCY MEDICINE

## 2025-07-08 RX ORDER — NITROGLYCERIN 0.4 MG/1
0.4 TABLET SUBLINGUAL
Status: DISCONTINUED | OUTPATIENT
Start: 2025-07-08 | End: 2025-07-12 | Stop reason: HOSPADM

## 2025-07-08 RX ORDER — BISACODYL 10 MG
10 SUPPOSITORY, RECTAL RECTAL DAILY PRN
Status: DISCONTINUED | OUTPATIENT
Start: 2025-07-08 | End: 2025-07-12 | Stop reason: HOSPADM

## 2025-07-08 RX ORDER — SODIUM CHLORIDE 0.9 % (FLUSH) 0.9 %
10 SYRINGE (ML) INJECTION AS NEEDED
Status: DISCONTINUED | OUTPATIENT
Start: 2025-07-08 | End: 2025-07-12 | Stop reason: HOSPADM

## 2025-07-08 RX ORDER — ACETAMINOPHEN 325 MG/1
650 TABLET ORAL EVERY 4 HOURS PRN
Status: DISCONTINUED | OUTPATIENT
Start: 2025-07-08 | End: 2025-07-12 | Stop reason: HOSPADM

## 2025-07-08 RX ORDER — NITROGLYCERIN 20 MG/100ML
10-50 INJECTION INTRAVENOUS
Status: DISCONTINUED | OUTPATIENT
Start: 2025-07-08 | End: 2025-07-08

## 2025-07-08 RX ORDER — MORPHINE SULFATE 2 MG/ML
2 INJECTION, SOLUTION INTRAMUSCULAR; INTRAVENOUS ONCE AS NEEDED
Status: COMPLETED | OUTPATIENT
Start: 2025-07-08 | End: 2025-07-08

## 2025-07-08 RX ORDER — AMLODIPINE BESYLATE 2.5 MG/1
2.5 TABLET ORAL
Status: DISCONTINUED | OUTPATIENT
Start: 2025-07-08 | End: 2025-07-12 | Stop reason: HOSPADM

## 2025-07-08 RX ORDER — SODIUM CHLORIDE 9 MG/ML
40 INJECTION, SOLUTION INTRAVENOUS AS NEEDED
Status: DISCONTINUED | OUTPATIENT
Start: 2025-07-08 | End: 2025-07-12 | Stop reason: HOSPADM

## 2025-07-08 RX ORDER — AMOXICILLIN 250 MG
2 CAPSULE ORAL 2 TIMES DAILY PRN
Status: DISCONTINUED | OUTPATIENT
Start: 2025-07-08 | End: 2025-07-12 | Stop reason: HOSPADM

## 2025-07-08 RX ORDER — IOPAMIDOL 755 MG/ML
100 INJECTION, SOLUTION INTRAVASCULAR
Status: COMPLETED | OUTPATIENT
Start: 2025-07-08 | End: 2025-07-08

## 2025-07-08 RX ORDER — LOSARTAN POTASSIUM 25 MG/1
25 TABLET ORAL
Status: DISCONTINUED | OUTPATIENT
Start: 2025-07-08 | End: 2025-07-12 | Stop reason: HOSPADM

## 2025-07-08 RX ORDER — ONDANSETRON 2 MG/ML
4 INJECTION INTRAMUSCULAR; INTRAVENOUS ONCE
Status: COMPLETED | OUTPATIENT
Start: 2025-07-08 | End: 2025-07-08

## 2025-07-08 RX ORDER — PROCHLORPERAZINE EDISYLATE 5 MG/ML
5 INJECTION INTRAMUSCULAR; INTRAVENOUS ONCE
Status: COMPLETED | OUTPATIENT
Start: 2025-07-08 | End: 2025-07-08

## 2025-07-08 RX ORDER — SODIUM CHLORIDE 0.9 % (FLUSH) 0.9 %
10 SYRINGE (ML) INJECTION EVERY 12 HOURS SCHEDULED
Status: DISCONTINUED | OUTPATIENT
Start: 2025-07-08 | End: 2025-07-12 | Stop reason: HOSPADM

## 2025-07-08 RX ORDER — POLYETHYLENE GLYCOL 3350 17 G/17G
17 POWDER, FOR SOLUTION ORAL DAILY PRN
Status: DISCONTINUED | OUTPATIENT
Start: 2025-07-08 | End: 2025-07-12 | Stop reason: HOSPADM

## 2025-07-08 RX ORDER — ENOXAPARIN SODIUM 100 MG/ML
1 INJECTION SUBCUTANEOUS 2 TIMES DAILY
Status: DISCONTINUED | OUTPATIENT
Start: 2025-07-08 | End: 2025-07-11

## 2025-07-08 RX ORDER — MORPHINE SULFATE 2 MG/ML
2 INJECTION, SOLUTION INTRAMUSCULAR; INTRAVENOUS
Status: ACTIVE | OUTPATIENT
Start: 2025-07-08 | End: 2025-07-09

## 2025-07-08 RX ORDER — ACETAMINOPHEN 160 MG/5ML
650 SOLUTION ORAL EVERY 4 HOURS PRN
Status: DISCONTINUED | OUTPATIENT
Start: 2025-07-08 | End: 2025-07-12 | Stop reason: HOSPADM

## 2025-07-08 RX ORDER — BISACODYL 5 MG/1
5 TABLET, DELAYED RELEASE ORAL DAILY PRN
Status: DISCONTINUED | OUTPATIENT
Start: 2025-07-08 | End: 2025-07-12 | Stop reason: HOSPADM

## 2025-07-08 RX ORDER — DIAZEPAM 10 MG/2ML
5 INJECTION, SOLUTION INTRAMUSCULAR; INTRAVENOUS ONCE
Status: COMPLETED | OUTPATIENT
Start: 2025-07-08 | End: 2025-07-08

## 2025-07-08 RX ORDER — ACETAMINOPHEN 650 MG/1
650 SUPPOSITORY RECTAL EVERY 4 HOURS PRN
Status: DISCONTINUED | OUTPATIENT
Start: 2025-07-08 | End: 2025-07-12 | Stop reason: HOSPADM

## 2025-07-08 RX ORDER — PANTOPRAZOLE SODIUM 40 MG/10ML
40 INJECTION, POWDER, LYOPHILIZED, FOR SOLUTION INTRAVENOUS NIGHTLY
Status: DISCONTINUED | OUTPATIENT
Start: 2025-07-08 | End: 2025-07-11

## 2025-07-08 RX ORDER — ONDANSETRON 2 MG/ML
4 INJECTION INTRAMUSCULAR; INTRAVENOUS ONCE
Status: DISCONTINUED | OUTPATIENT
Start: 2025-07-08 | End: 2025-07-08

## 2025-07-08 RX ORDER — ONDANSETRON 2 MG/ML
4 INJECTION INTRAMUSCULAR; INTRAVENOUS EVERY 6 HOURS PRN
Status: DISCONTINUED | OUTPATIENT
Start: 2025-07-08 | End: 2025-07-12 | Stop reason: HOSPADM

## 2025-07-08 RX ORDER — DIAZEPAM 10 MG/2ML
5 INJECTION, SOLUTION INTRAMUSCULAR; INTRAVENOUS ONCE
Status: COMPLETED | OUTPATIENT
Start: 2025-07-09 | End: 2025-07-08

## 2025-07-08 RX ADMIN — MORPHINE SULFATE 2 MG: 2 INJECTION, SOLUTION INTRAMUSCULAR; INTRAVENOUS at 20:16

## 2025-07-08 RX ADMIN — ONDANSETRON 4 MG: 2 INJECTION, SOLUTION INTRAMUSCULAR; INTRAVENOUS at 16:09

## 2025-07-08 RX ADMIN — NITROGLYCERIN 20 MCG/MIN: 20 INJECTION INTRAVENOUS at 16:57

## 2025-07-08 RX ADMIN — IOPAMIDOL 100 ML: 755 INJECTION, SOLUTION INTRAVENOUS at 21:22

## 2025-07-08 RX ADMIN — Medication 10 ML: at 20:29

## 2025-07-08 RX ADMIN — NITROGLYCERIN 40 MCG/MIN: 20 INJECTION INTRAVENOUS at 18:36

## 2025-07-08 RX ADMIN — NITROGLYCERIN 35 MCG/MIN: 20 INJECTION INTRAVENOUS at 18:11

## 2025-07-08 RX ADMIN — DIAZEPAM 5 MG: 10 INJECTION, SOLUTION INTRAMUSCULAR; INTRAVENOUS at 21:36

## 2025-07-08 RX ADMIN — AMLODIPINE BESYLATE 2.5 MG: 2.5 TABLET ORAL at 19:47

## 2025-07-08 RX ADMIN — NITROGLYCERIN 25 MCG/MIN: 20 INJECTION INTRAVENOUS at 17:32

## 2025-07-08 RX ADMIN — NITROGLYCERIN 10 MCG/MIN: 20 INJECTION INTRAVENOUS at 16:34

## 2025-07-08 RX ADMIN — ENOXAPARIN SODIUM 90 MG: 100 INJECTION SUBCUTANEOUS at 19:47

## 2025-07-08 RX ADMIN — PANTOPRAZOLE SODIUM 40 MG: 40 INJECTION, POWDER, FOR SOLUTION INTRAVENOUS at 21:36

## 2025-07-08 RX ADMIN — DIAZEPAM 5 MG: 10 INJECTION, SOLUTION INTRAMUSCULAR; INTRAVENOUS at 23:09

## 2025-07-08 RX ADMIN — NITROGLYCERIN 30 MCG/MIN: 20 INJECTION INTRAVENOUS at 17:52

## 2025-07-08 RX ADMIN — PROCHLORPERAZINE EDISYLATE 5 MG: 5 INJECTION INTRAMUSCULAR; INTRAVENOUS at 21:36

## 2025-07-08 NOTE — Clinical Note
Level of Care: Progressive Care [20]   Diagnosis: Unstable angina [[id number]]   Admitting Physician: JULIEN BROWNE [[id number]]   Certification: I Certify That Inpatient Hospital Services Are Medically Necessary For Greater Than 2 Midnights

## 2025-07-08 NOTE — H&P
"[facility] Medicine Services  History & Physical    Patient Name: Giovani Moseley  : 1950  MRN: [id number]  Primary Care Physician:  Yvonne Santa MD  Date of admission: 2025  Date and Time of Service: 2025 at 1630    Subjective      Chief Complaint: chest pressure    History of Present Illness: Giovani Moseley is a 75 y.o. male with a CMH of AAA without rupture, hypertension, hyperlipidemia, OAB, BPH, leg cramps who presented to [facility] on 2025 with chest pressure.  Patient reported he has had symptoms all day today, describes as \" a lot of discomfort, feels like pressure up here\" pointing to epigastric region.  Patient reported for the past couple of weeks he has been having leg cramping at nighttime, so he cut back on his dose of losartan and nexlizet to half dose, and reported sometimes he skips doses.  Patient reported he vomited 3 times in the emergency department.  Patient reported he had a normal bowel movement this morning.  Patient reported he occasionally takes doxepin  for sleep, reported he does not take the medication for anxiety.    In the ED: patient hemodynamically stable, afebrile. Patient not requiring supplemental oxygen. Pertinent labs include HS trop 9 -> 8, BMP and CBC unremarkable. EKG showed sinus bradycardia with PVCs and nonspecific IVCD with LAD, rate 57, QTC of 431 ms. CXR showed no acute cardiopulmonary disease.  Patient started on nitro drip. Hospitalist team to admit for further management.      Review of Systems   Constitutional:  Negative for chills and fever.   Respiratory:  Negative for shortness of breath.    Cardiovascular:  Positive for chest pain. Negative for leg swelling.   Gastrointestinal:  Positive for nausea and vomiting. Negative for constipation and diarrhea.   Genitourinary:  Negative for dysuria and hematuria.       Personal History     Past Medical History:   Diagnosis Date    AAA (abdominal aortic aneurysm) without rupture " 03/31/2021    Abnormal ECG 01/18/2019    I believe past submitted records reflect such    Benign prostate hyperplasia     Cataract     Congenital heart 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    S/A    Coronary artery 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    S/A    Hyperlipidemia     Hypertension 01/10/2024    Dr. 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 Stephens    COLONOSCOPY      EYE SURGERY Bilateral 10/3 and 10/15    PROSTATE SURGERY  Dr. Payne ([place])    01-    TONSILLECTOMY         Family History: family history includes Alzheimer's disease in his father; Cancer in his brother, father, and mother; Kidney disease in his brother; Pancreatic cancer in his mother; Vision loss in his mother. Otherwise pertinent FHx was reviewed and not pertinent to current issue.    Social History:  reports that he quit smoking about 25 years ago. His smoking use included cigarettes. He started smoking about 60 years ago. He has a 35 pack-year smoking history. He has been exposed to tobacco smoke. He has never used smokeless tobacco. He reports current alcohol use of about 1.0 standard drink of alcohol per week. He reports that he does not use drugs.    Home Medications:  Prior to Admission Medications       Prescriptions Last Dose Informant Patient Reported? Taking?    Bempedoic Acid-Ezetimibe 180-10 MG tablet   No No    Take 1 tablet by mouth Daily.    Coenzyme Q10 (CoQ10) 100 MG capsule   Yes No    doxepin (SINEquan) 10 MG capsule   No No    Take 1 capsule by mouth As Needed for Sleep. prn    losartan (COZAAR) 25 MG tablet   No No    Take 1 tablet by mouth Daily.    silodosin (RAPAFLO) 8 MG capsule capsule   Yes No    TAKE ONE (1) CAPSULE BY MOUTH EVERY DAY    Vibegron (Gemtesa) 75 MG tablet   Yes No              Allergies:  No  Known Allergies    Objective      Vitals:   Temp:  [97.6 °F (36.4 °C)-97.9 °F (36.6 °C)] 97.6 °F (36.4 °C)  Heart Rate:  [51-64] 63  Resp:  [16-17] 17  BP: (141-180)/() 141/78  Body mass index is 31.18 kg/m².  Physical Exam  HENT:      Head: Normocephalic and atraumatic.      Nose: Nose normal.      Mouth/Throat:      Mouth: Mucous membranes are moist.      Pharynx: Oropharynx is clear.   Eyes:      Extraocular Movements: Extraocular movements intact.      Conjunctiva/sclera: Conjunctivae normal.      Pupils: Pupils are equal, round, and reactive to light.   Cardiovascular:      Rate and Rhythm: Normal rate and regular rhythm.      Pulses: Normal pulses.      Heart sounds: Normal heart sounds.   Pulmonary:      Effort: Pulmonary effort is normal.      Breath sounds: Normal breath sounds.   Chest:      Comments: Reproducible chest pain  Abdominal:      General: Bowel sounds are normal.      Palpations: Abdomen is soft.   Musculoskeletal:         General: Normal range of motion.   Skin:     General: Skin is warm and dry.   Neurological:      General: No focal deficit present.      Mental Status: He is alert and oriented to person, place, and time.   Psychiatric:         Mood and Affect: Mood is anxious.         Behavior: Behavior normal.         Thought Content: Thought content normal.         Judgment: Judgment normal.         Diagnostic Data:  Lab Results (last 24 hours)       Procedure Component Value Units Date/Time    Lipid Panel [[id number]] Collected: 07/08/25 1521    Specimen: Blood Updated: 07/08/25 1712     Total Cholesterol 134 mg/dL      Triglycerides 141 mg/dL      HDL Cholesterol 47 mg/dL      LDL Cholesterol  63 mg/dL      VLDL Cholesterol 24 mg/dL      LDL/HDL Ratio 1.25    Narrative:      Cholesterol Reference Ranges  (U.S. Department of Health and Human Services ATP III Classifications)    Desirable          <200 mg/dL  Borderline High    200-239 mg/dL  High Risk          >240  mg/dL      Triglyceride Reference Ranges  (U.S. Department of Health and Human Services ATP III Classifications)    Normal           <150 mg/dL  Borderline High  150-199 mg/dL  High             200-499 mg/dL  Very High        >500 mg/dL    HDL Reference Ranges  (U.S. Department of Health and Human Services ATP III Classifications)    Low     <40 mg/dl (major risk factor for CHD)  High    >60 mg/dl ('negative' risk factor for CHD)        LDL Reference Ranges  (U.S. Department of Health and Human Services ATP III Classifications)    Optimal          <100 mg/dL  Near Optimal     100-129 mg/dL  Borderline High  130-159 mg/dL  High             160-189 mg/dL  Very High        >189 mg/dL    LDL is calculated using the NIH LDL-C calculation.      TSH [[id number]]  (Normal) Collected: 07/08/25 1521    Specimen: Blood Updated: 07/08/25 1712     TSH 1.920 uIU/mL     Hemoglobin A1c [[id number]]  (Abnormal) Collected: 07/08/25 1413    Specimen: Blood Updated: 07/08/25 1706     Hemoglobin A1C 5.81 %     Narrative:      Hemoglobin A1C Ranges:    Increased Risk for Diabetes  5.7% to 6.4%  Diabetes                     >= 6.5%  Diabetic Goal                < 7.0%    High Sensitivity Troponin T 1Hr [[id number]]  (Normal) Collected: 07/08/25 1521    Specimen: Blood Updated: 07/08/25 1554     HS Troponin T 8 ng/L      Troponin T Numeric Delta -1 ng/L     Narrative:      High Sensitive Troponin T Reference Range:  <14.0 ng/L- Negative Female for AMI  <22.0 ng/L- Negative Male for AMI  >=14 - Abnormal Female indicating possible myocardial injury.  >=22 - Abnormal Male indicating possible myocardial injury.   Clinicians would have to utilize clinical acumen, EKG, Troponin, and serial changes to determine if it is an Acute Myocardial Infarction or myocardial injury due to an underlying chronic condition.         High Sensitivity Troponin T [[id number]]  (Normal) Collected: 07/08/25 1413    Specimen: Blood Updated: 07/08/25 1446     HS Troponin  T 9 ng/L     Narrative:      High Sensitive Troponin T Reference Range:  <14.0 ng/L- Negative Female for AMI  <22.0 ng/L- Negative Male for AMI  >=14 - Abnormal Female indicating possible myocardial injury.  >=22 - Abnormal Male indicating possible myocardial injury.   Clinicians would have to utilize clinical acumen, EKG, Troponin, and serial changes to determine if it is an Acute Myocardial Infarction or myocardial injury due to an underlying chronic condition.         Basic Metabolic Panel [[id number]]  (Normal) Collected: 07/08/25 1413    Specimen: Blood Updated: 07/08/25 1446     Glucose 95 mg/dL      BUN 14.2 mg/dL      Creatinine 0.88 mg/dL      Sodium 140 mmol/L      Potassium 4.0 mmol/L      Chloride 103 mmol/L      CO2 26.5 mmol/L      Calcium 9.5 mg/dL      BUN/Creatinine Ratio 16.1     Anion Gap 10.5 mmol/L      eGFR 90.2 mL/min/1.73     Narrative:      GFR Categories in Chronic Kidney Disease (CKD)              GFR Category          GFR (mL/min/1.73)    Interpretation  G1                    90 or greater        Normal or high (1)  G2                    60-89                Mild decrease (1)  G3a                   45-59                Mild to moderate decrease  G3b                   30-44                Moderate to severe decrease  G4                    15-29                Severe decrease  G5                    14 or less           Kidney failure    (1)In the absence of evidence of kidney disease, neither GFR category G1 or G2 fulfill the criteria for CKD.    eGFR calculation 2021 CKD-EPI creatinine equation, which does not include race as a factor    Extra Tubes [[id number]] Collected: 07/08/25 1413    Specimen: Blood, Venous Line Updated: 07/08/25 1430    Narrative:      The following orders were created for panel order Extra Tubes.  Procedure                               Abnormality         Status                     ---------                               -----------         ------                      Gold Top - SST[[id number]]                                   Final result               Light Blue Top[[id number]]                                   Final result                 Please view results for these tests on the individual orders.    Gold Top - SST [[id number]] Collected: 07/08/25 1413    Specimen: Blood Updated: 07/08/25 1430     Extra Tube Hold for add-ons.     Comment: Auto resulted.       Light Blue Top [[id number]] Collected: 07/08/25 1413    Specimen: Blood Updated: 07/08/25 1430     Extra Tube Hold for add-ons.     Comment: Auto resulted       CBC & Differential [[id number]]  (Normal) Collected: 07/08/25 1413    Specimen: Blood Updated: 07/08/25 1421    Narrative:      The following orders were created for panel order CBC & Differential.  Procedure                               Abnormality         Status                     ---------                               -----------         ------                     CBC Auto Differential[[id number]]        Normal              Final result                 Please view results for these tests on the individual orders.    CBC Auto Differential [[id number]]  (Normal) Collected: 07/08/25 1413    Specimen: Blood Updated: 07/08/25 1421     WBC 5.91 10*3/mm3      RBC 4.54 10*6/mm3      Hemoglobin 14.7 g/dL      Hematocrit 42.9 %      MCV 94.5 fL      MCH 32.4 pg      MCHC 34.3 g/dL      RDW 12.6 %      RDW-SD 43.6 fl      MPV 9.4 fL      Platelets 243 10*3/mm3      Neutrophil % 60.6 %      Lymphocyte % 27.2 %      Monocyte % 9.8 %      Eosinophil % 1.4 %      Basophil % 0.7 %      Immature Grans % 0.3 %      Neutrophils, Absolute 3.58 10*3/mm3      Lymphocytes, Absolute 1.61 10*3/mm3      Monocytes, Absolute 0.58 10*3/mm3      Eosinophils, Absolute 0.08 10*3/mm3      Basophils, Absolute 0.04 10*3/mm3      Immature Grans, Absolute 0.02 10*3/mm3      nRBC 0.0 /100 WBC              Imaging Results (Last 24 Hours)       Procedure Component Value Units Date/Time    XR Chest 1  View [[id number]] Collected: 07/08/25 1427     Updated: 07/08/25 1430    Narrative:      XR CHEST 1 VW    Date of Exam: 7/8/2025 2:14 PM EDT    Indication: Chest pain    Comparison: 2/17/2025    Findings:  The lungs are clear bilaterally. Pleural spaces are normal. Cardiac and mediastinal contours are within normal limits. Regional skeleton is within normal limits for age.      Impression:      Impression:  No acute cardiopulmonary disease.        Electronically Signed: Charbel Harvey MD    7/8/2025 2:28 PM EDT    Workstation ID: [id number]              Assessment & Plan        This is a 75 y.o. male with:    Active and Resolved Problems  Active Hospital Problems    Diagnosis  POA    **Unstable angina [I20.0]  Yes      Resolved Hospital Problems   No resolved problems to display.     Chest pain/ epigastric pain  Acute coronary syndrome - UA  History of AAA without rupture  - EKG: non-ischemic  - CXR: No acute cardiopulmonary process  - Serial Trops: 9 -> 8  - Hgb A1c, lipid panel, TSH: pending  - Continue nitro drip  - Weight-based lovenox BID  - No NSAIDs except ASA  - Continuous cardiac monitor, pulse ox  - Titrate O2 to keep O2 sat >92%  - Repeat EKG in AM  - TTE ordered  - Cardiology consulted   --- UPDATE at 2100: STAT CTA chest, CT abdomen ordered for worsening epigastric pain, now also with RUQ/LUQ pain; nitro drip discontinued since pain has not improved; morphine 2 mg ordered    Hypertension  - Continue home medications  - Monitor BP per order and as needed    Hyperlipidemia  - Continue statin  - Lipid panel: pending    Overactive bladder  - Continue home vibegron    BPH  - Continue home sildosin    Home medications for chronic conditions will be restarted as appropriate when verified by pharmacy.     VTE Prophylaxis:  Pharmacologic VTE prophylaxis orders are present.        The patient desires to be as follows:    CODE STATUS:    Code Status (Patient has no pulse and is not breathing): CPR (Attempt to  Resuscitate)  Medical Interventions (Patient has pulse or is breathing): Full Support  Level Of Support Discussed With: Patient        Libia Moseley, spouse, who can be contacted at [phone], is the designated person to make medical decisions on the patient's behalf if He is incapable of doing so. This was clarified with patient and/or next of kin on 7/8/2025 during the course of this H&P.    Admission Status:  I believe this patient meets inpatient status.    Expected Length of Stay: Greater than 2 midnights    PDMP and Medication Dispenses via Sidebar reviewed and consistent with patient reported medications.    I discussed the patient's findings and my recommendations with patient.      Signature:     This document has been electronically signed by MARIA DEL ROSARIO Kaur on July 8, 2025 20:23 EDT   [facility] Hospitalist Team

## 2025-07-08 NOTE — ED PROVIDER NOTES
Subjective   History of Present Illness  Patient 75-year-old male, with chest pressure throughout the day today.  Denies cough fever shortness of breath or other complaint.      Review of Systems    Past Medical History:   Diagnosis Date    AAA (abdominal aortic aneurysm) without rupture 2021    Abnormal ECG 2019    I believe past submitted records reflect such    Benign prostate hyperplasia     Cataract     Congenital heart disease 2015    S/A    Coronary artery disease 2015    S/A    Hyperlipidemia     Hypertension 01/10/2024    Dr. 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 Stephens    COLONOSCOPY      EYE SURGERY Bilateral 10/3 and 10/15    PROSTATE SURGERY  Dr. Payne ([place])    2019    TONSILLECTOMY         Family History   Problem Relation Age of Onset    Cancer Mother         Breast/ Pancreatic/    Pancreatic cancer Mother     Vision loss Mother         Mascular    Cancer Father         Prostrate/    Alzheimer's disease Father     Cancer Brother         lung cancer (alive)    Kidney disease Brother        Social History     Socioeconomic History    Marital status:    Tobacco Use    Smoking status: Former     Current packs/day: 0.00     Average packs/day: 1 pack/day for 35.0 years (35.0 ttl pk-yrs)     Types: Cigarettes     Start date: 1965     Quit date: 2000     Years since quittin.5     Passive exposure: Past    Smokeless tobacco: Never    Tobacco comments:     Stopped roughly in beginning of . light to moderate smoker throughout my history   Vaping Use    Vaping status: Never Used   Substance and Sexual Activity    Alcohol use: Yes     Alcohol/week: 1.0 standard drink of alcohol      Types: 1 Cans of beer per week     Comment: light drinker when socializing    Drug use: No    Sexual activity: Not Currently           Objective   Physical Exam  Neck has no adenopathy JVD or bruits.  Lungs are clear.  Heart has regular rate rhythm without murmur rub or gallop.  Chest is nontender.  Abdomen soft nontender.  Extremities I am unremarkable.  Procedures     My EKG interpretation shows normal sinus rhythm at a rate of 57 with no acute ST change      ED Course      Results for orders placed or performed during the hospital encounter of 07/08/25   ECG 12 Lead Chest Pain    Collection Time: 07/08/25  1:52 PM   Result Value Ref Range    QT Interval 445 ms    QTC Interval 431 ms   Basic Metabolic Panel    Collection Time: 07/08/25  2:13 PM    Specimen: Blood   Result Value Ref Range    Glucose 95 65 - 99 mg/dL    BUN 14.2 8.0 - 23.0 mg/dL    Creatinine 0.88 0.76 - 1.27 mg/dL    Sodium 140 136 - 145 mmol/L    Potassium 4.0 3.5 - 5.2 mmol/L    Chloride 103 98 - 107 mmol/L    CO2 26.5 22.0 - 29.0 mmol/L    Calcium 9.5 8.6 - 10.5 mg/dL    BUN/Creatinine Ratio 16.1 7.0 - 25.0    Anion Gap 10.5 5.0 - 15.0 mmol/L    eGFR 90.2 >60.0 mL/min/1.73   High Sensitivity Troponin T    Collection Time: 07/08/25  2:13 PM    Specimen: Blood   Result Value Ref Range    HS Troponin T 9 <22 ng/L   CBC Auto Differential    Collection Time: 07/08/25  2:13 PM    Specimen: Blood   Result Value Ref Range    WBC 5.91 3.40 - 10.80 10*3/mm3    RBC 4.54 4.14 - 5.80 10*6/mm3    Hemoglobin 14.7 13.0 - 17.7 g/dL    Hematocrit 42.9 37.5 - 51.0 %    MCV 94.5 79.0 - 97.0 fL    MCH 32.4 26.6 - 33.0 pg    MCHC 34.3 31.5 - 35.7 g/dL    RDW 12.6 12.3 - 15.4 %    RDW-SD 43.6 37.0 - 54.0 fl    MPV 9.4 6.0 - 12.0 fL    Platelets 243 140 - 450 10*3/mm3    Neutrophil % 60.6 42.7 - 76.0 %    Lymphocyte % 27.2 19.6 - 45.3 %    Monocyte % 9.8 5.0 - 12.0 %    Eosinophil % 1.4 0.3 - 6.2 %    Basophil % 0.7 0.0 - 1.5 %    Immature Grans % 0.3 0.0 - 0.5 %     Neutrophils, Absolute 3.58 1.70 - 7.00 10*3/mm3    Lymphocytes, Absolute 1.61 0.70 - 3.10 10*3/mm3    Monocytes, Absolute 0.58 0.10 - 0.90 10*3/mm3    Eosinophils, Absolute 0.08 0.00 - 0.40 10*3/mm3    Basophils, Absolute 0.04 0.00 - 0.20 10*3/mm3    Immature Grans, Absolute 0.02 0.00 - 0.05 10*3/mm3    nRBC 0.0 0.0 - 0.2 /100 WBC   Hemoglobin A1c    Collection Time: 07/08/25  2:13 PM    Specimen: Blood   Result Value Ref Range    Hemoglobin A1C 5.81 (H) 4.80 - 5.60 %   Gold Top - SST    Collection Time: 07/08/25  2:13 PM   Result Value Ref Range    Extra Tube Hold for add-ons.    Light Blue Top    Collection Time: 07/08/25  2:13 PM   Result Value Ref Range    Extra Tube Hold for add-ons.    High Sensitivity Troponin T 1Hr    Collection Time: 07/08/25  3:21 PM    Specimen: Blood   Result Value Ref Range    HS Troponin T 8 <22 ng/L    Troponin T Numeric Delta -1 Abnormal if >/=3 ng/L   Lipid Panel    Collection Time: 07/08/25  3:21 PM    Specimen: Blood   Result Value Ref Range    Total Cholesterol 134 0 - 200 mg/dL    Triglycerides 141 0 - 150 mg/dL    HDL Cholesterol 47 40 - 60 mg/dL    LDL Cholesterol  63 0 - 100 mg/dL    VLDL Cholesterol 24 5 - 40 mg/dL    LDL/HDL Ratio 1.25    TSH    Collection Time: 07/08/25  3:21 PM    Specimen: Blood   Result Value Ref Range    TSH 1.920 0.270 - 4.200 uIU/mL   ECG 12 Lead Chest Pain    Collection Time: 07/08/25  8:27 PM   Result Value Ref Range    QT Interval 422 ms    QTC Interval 428 ms   Basic Metabolic Panel    Collection Time: 07/09/25  4:43 AM    Specimen: Arm, Left; Blood   Result Value Ref Range    Glucose 118 (H) 65 - 99 mg/dL    BUN 11.8 8.0 - 23.0 mg/dL    Creatinine 0.90 0.76 - 1.27 mg/dL    Sodium 137 136 - 145 mmol/L    Potassium 3.8 3.5 - 5.2 mmol/L    Chloride 102 98 - 107 mmol/L    CO2 22.9 22.0 - 29.0 mmol/L    Calcium 9.3 8.6 - 10.5 mg/dL    BUN/Creatinine Ratio 13.1 7.0 - 25.0    Anion Gap 12.1 5.0 - 15.0 mmol/L    eGFR 89.6 >60.0 mL/min/1.73   Magnesium     Collection Time: 07/09/25  4:43 AM    Specimen: Arm, Left; Blood   Result Value Ref Range    Magnesium 1.9 1.6 - 2.4 mg/dL   Phosphorus    Collection Time: 07/09/25  4:43 AM    Specimen: Arm, Left; Blood   Result Value Ref Range    Phosphorus 3.6 2.5 - 4.5 mg/dL   CBC Auto Differential    Collection Time: 07/09/25  4:43 AM    Specimen: Arm, Left; Blood   Result Value Ref Range    WBC 13.63 (H) 3.40 - 10.80 10*3/mm3    RBC 4.59 4.14 - 5.80 10*6/mm3    Hemoglobin 15.0 13.0 - 17.7 g/dL    Hematocrit 43.4 37.5 - 51.0 %    MCV 94.6 79.0 - 97.0 fL    MCH 32.7 26.6 - 33.0 pg    MCHC 34.6 31.5 - 35.7 g/dL    RDW 12.6 12.3 - 15.4 %    RDW-SD 43.9 37.0 - 54.0 fl    MPV 9.8 6.0 - 12.0 fL    Platelets 230 140 - 450 10*3/mm3    Neutrophil % 81.5 (H) 42.7 - 76.0 %    Lymphocyte % 8.7 (L) 19.6 - 45.3 %    Monocyte % 9.2 5.0 - 12.0 %    Eosinophil % 0.0 (L) 0.3 - 6.2 %    Basophil % 0.3 0.0 - 1.5 %    Immature Grans % 0.3 0.0 - 0.5 %    Neutrophils, Absolute 11.11 (H) 1.70 - 7.00 10*3/mm3    Lymphocytes, Absolute 1.19 0.70 - 3.10 10*3/mm3    Monocytes, Absolute 1.25 (H) 0.10 - 0.90 10*3/mm3    Eosinophils, Absolute 0.00 0.00 - 0.40 10*3/mm3    Basophils, Absolute 0.04 0.00 - 0.20 10*3/mm3    Immature Grans, Absolute 0.04 0.00 - 0.05 10*3/mm3    nRBC 0.0 0.0 - 0.2 /100 WBC   Lipase    Collection Time: 07/09/25  4:43 AM    Specimen: Arm, Left; Blood   Result Value Ref Range    Lipase 816 (H) 13 - 60 U/L   Comprehensive Metabolic Panel    Collection Time: 07/09/25  4:43 AM    Specimen: Arm, Left; Blood   Result Value Ref Range    Glucose 116 (H) 65 - 99 mg/dL    BUN 11.7 8.0 - 23.0 mg/dL    Creatinine 0.89 0.76 - 1.27 mg/dL    Sodium 139 136 - 145 mmol/L    Potassium 4.1 3.5 - 5.2 mmol/L    Chloride 103 98 - 107 mmol/L    CO2 20.7 (L) 22.0 - 29.0 mmol/L    Calcium 9.4 8.6 - 10.5 mg/dL    Total Protein 6.4 6.0 - 8.5 g/dL    Albumin 4.4 3.5 - 5.2 g/dL    ALT (SGPT) 31 1 - 41 U/L    AST (SGOT) 31 1 - 40 U/L    Alkaline Phosphatase 34  (L) 39 - 117 U/L    Total Bilirubin 1.7 (H) 0.0 - 1.2 mg/dL    Globulin 2.0 gm/dL    A/G Ratio 2.2 g/dL    BUN/Creatinine Ratio 13.1 7.0 - 25.0    Anion Gap 15.3 (H) 5.0 - 15.0 mmol/L    eGFR 89.9 >60.0 mL/min/1.73   Amylase    Collection Time: 07/09/25  4:43 AM    Specimen: Arm, Left; Blood   Result Value Ref Range    Amylase 302 (H) 28 - 100 U/L   ECG 12 Lead Chest Pain    Collection Time: 07/09/25  5:58 AM   Result Value Ref Range    QT Interval 362 ms    QTC Interval 463 ms   Stress Test With Myocardial Perfusion One Day    Collection Time: 07/09/25 10:47 AM   Result Value Ref Range    BH CV STRESS PROTOCOL 1 Pharmacologic     Stage 1 1.0     HR Stage 1 74     BP Stage 1 146/75     Duration Min Stage 1 0     Duration Sec Stage 1 10     Stress Dose Regadenoson Stage 1 0.40     Stress Comments Stage 1 10 sec bolus injection     Stage 2 2.0     HR Stage 2 80     BP Stage 2 116/42     Duration Min Stage 2 4     Duration Sec Stage 2 0     Stress Comments Stage 2 recovery     Baseline HR 74 bpm    Baseline /75 mmHg    Peak HR 92 bpm    Peak /42 mmHg    Recovery HR 88 bpm    Recovery /62 mmHg    Target HR (85%) 124 bpm    Max. Pred. HR (100%) 146 bpm    Percent Max Pred HR 63.01 %    Percent Target HR 74 %     CV REST NUCLEAR ISOTOPE DOSE 11.0 mCi    BH CV STRESS NUCLEAR ISOTOPE DOSE 32.1 mCi    Nuc Stress EF 59 %   Adult Transthoracic Echo Complete w/ Color, Spectral and Contrast if necessary per protocol    Collection Time: 07/09/25 11:45 AM   Result Value Ref Range    EF(MOD-bp) 62.6 %    EF_3D-VOL 60.0 %    LV GLOBAL STRAIN  -18.3 %    LVIDd 5.3 cm    LVIDs 3.4 cm    IVSd 1.10 cm    LVPWd 1.10 cm    FS 35.8 %    IVS/LVPW 1.00 cm    ESV(cubed) 39.3 ml    LV Sys Vol (BSA corrected) 23.5 cm2    EDV(cubed) 148.9 ml    LV [place] Vol (BSA corrected) 58.6 cm2    LV mass(C)d 227.7 grams    LVOT area 3.1 cm2    LVOT diam 2.00 cm    EDV(MOD-sp2) 132.0 ml    EDV(MOD-sp4) 118.0 ml    ESV(MOD-sp2) 48.7 ml     ESV(MOD-sp4) 47.3 ml    SV(MOD-sp2) 83.3 ml    SV(MOD-sp4) 70.7 ml    SVi(MOD-SP2) 41.4 ml/m2    SVi(MOD-SP4) 35.1 ml/m2    SVi (LVOT) 31.4 ml/m2    EF(MOD-sp2) 63.1 %    EF(MOD-sp4) 59.9 %    MV E max manny 62.8 cm/sec    MV A max manny 66.5 cm/sec    MV dec time 0.27 sec    MV E/A 0.94     Pulm A Revs Dur 0.12 sec    IVRT 100.0 ms    LA ESV Index (BP) 18.6 ml/m2    Med Peak E' Manny 5.4 cm/sec    Lat Peak E' Manny 14.1 cm/sec    TR max manny 170.0 cm/sec    Avg E/e' ratio 6.44     SV(LVOT) 63.1 ml    TAPSE (>1.6) 2.04 cm    RV S' 16.6 cm/sec    LA dimension (2D)  3.5 cm    Pulm Sys Manny 57.5 cm/sec    Pulm Montejo Manny 48.8 cm/sec    Pulm S/D 1.18     Pulm A Revs Manny 23.4 cm/sec    LV V1 max 100.0 cm/sec    LV V1 max PG 4.0 mmHg    LV V1 mean PG 2.00 mmHg    LV V1 VTI 20.1 cm    Ao pk manny 136.0 cm/sec    Ao max PG 7.4 mmHg    Ao mean PG 4.0 mmHg    Ao V2 VTI 27.2 cm    FRANCISCO(I,D) 2.32 cm2    Dimensionless Index 0.74 (DI)    AI P1/2t 554.5 msec    MV max PG 3.2 mmHg    MV mean PG 1.00 mmHg    MV V2 VTI 23.6 cm    MV P1/2t 69.1 msec    MVA(P1/2t) 3.2 cm2    MVA(VTI) 2.7 cm2    MV dec slope 329.0 cm/sec2    MR max manny 274.0 cm/sec    MR max PG 30.0 mmHg    TR max PG 11.6 mmHg    RVSP(TR) 19.6 mmHg    RAP systole 8.0 mmHg    RV V1 max PG 3.2 mmHg    RV V1 max 89.4 cm/sec    RV V1 VTI 13.7 cm    PA V2 max 166.0 cm/sec    PA acc time 0.13 sec    Sinus 3.3 cm    STJ 2.30 cm   Basic Metabolic Panel    Collection Time: 07/09/25 11:26 PM    Specimen: Blood   Result Value Ref Range    Glucose 109 (H) 65 - 99 mg/dL    BUN 16.2 8.0 - 23.0 mg/dL    Creatinine 0.91 0.76 - 1.27 mg/dL    Sodium 139 136 - 145 mmol/L    Potassium 4.3 3.5 - 5.2 mmol/L    Chloride 103 98 - 107 mmol/L    CO2 22.9 22.0 - 29.0 mmol/L    Calcium 9.2 8.6 - 10.5 mg/dL    BUN/Creatinine Ratio 17.8 7.0 - 25.0    Anion Gap 13.1 5.0 - 15.0 mmol/L    eGFR 88.4 >60.0 mL/min/1.73   Magnesium    Collection Time: 07/09/25 11:26 PM    Specimen: Blood   Result Value Ref Range     Magnesium 2.0 1.6 - 2.4 mg/dL   Phosphorus    Collection Time: 07/09/25 11:26 PM    Specimen: Blood   Result Value Ref Range    Phosphorus 2.5 2.5 - 4.5 mg/dL   CBC Auto Differential    Collection Time: 07/09/25 11:26 PM    Specimen: Blood   Result Value Ref Range    WBC 9.90 3.40 - 10.80 10*3/mm3    RBC 4.64 4.14 - 5.80 10*6/mm3    Hemoglobin 15.1 13.0 - 17.7 g/dL    Hematocrit 43.5 37.5 - 51.0 %    MCV 93.8 79.0 - 97.0 fL    MCH 32.5 26.6 - 33.0 pg    MCHC 34.7 31.5 - 35.7 g/dL    RDW 12.9 12.3 - 15.4 %    RDW-SD 44.5 37.0 - 54.0 fl    MPV 9.9 6.0 - 12.0 fL    Platelets 234 140 - 450 10*3/mm3    Neutrophil % 69.7 42.7 - 76.0 %    Lymphocyte % 16.0 (L) 19.6 - 45.3 %    Monocyte % 12.8 (H) 5.0 - 12.0 %    Eosinophil % 0.8 0.3 - 6.2 %    Basophil % 0.4 0.0 - 1.5 %    Immature Grans % 0.3 0.0 - 0.5 %    Neutrophils, Absolute 6.90 1.70 - 7.00 10*3/mm3    Lymphocytes, Absolute 1.58 0.70 - 3.10 10*3/mm3    Monocytes, Absolute 1.27 (H) 0.10 - 0.90 10*3/mm3    Eosinophils, Absolute 0.08 0.00 - 0.40 10*3/mm3    Basophils, Absolute 0.04 0.00 - 0.20 10*3/mm3    Immature Grans, Absolute 0.03 0.00 - 0.05 10*3/mm3    nRBC 0.0 0.0 - 0.2 /100 WBC   C-reactive Protein    Collection Time: 07/09/25 11:26 PM    Specimen: Blood   Result Value Ref Range    C-Reactive Protein 9.85 (H) 0.00 - 0.50 mg/dL   Magnesium    Collection Time: 07/11/25  1:16 AM    Specimen: Blood   Result Value Ref Range    Magnesium 1.9 1.6 - 2.4 mg/dL   Phosphorus    Collection Time: 07/11/25  1:16 AM    Specimen: Blood   Result Value Ref Range    Phosphorus 3.1 2.5 - 4.5 mg/dL   Lipase    Collection Time: 07/11/25  1:16 AM    Specimen: Blood   Result Value Ref Range    Lipase 48 13 - 60 U/L   Comprehensive Metabolic Panel    Collection Time: 07/11/25  1:16 AM    Specimen: Blood   Result Value Ref Range    Glucose 116 (H) 65 - 99 mg/dL    BUN 16.0 8.0 - 23.0 mg/dL    Creatinine 0.80 0.76 - 1.27 mg/dL    Sodium 137 136 - 145 mmol/L    Potassium 3.7 3.5 - 5.2  mmol/L    Chloride 104 98 - 107 mmol/L    CO2 25.1 22.0 - 29.0 mmol/L    Calcium 8.7 8.6 - 10.5 mg/dL    Total Protein 5.7 (L) 6.0 - 8.5 g/dL    Albumin 3.6 3.5 - 5.2 g/dL    ALT (SGPT) 19 1 - 41 U/L    AST (SGOT) 18 1 - 40 U/L    Alkaline Phosphatase 30 (L) 39 - 117 U/L    Total Bilirubin 1.1 0.0 - 1.2 mg/dL    Globulin 2.1 gm/dL    A/G Ratio 1.7 g/dL    BUN/Creatinine Ratio 20.0 7.0 - 25.0    Anion Gap 7.9 5.0 - 15.0 mmol/L    eGFR 92.9 >60.0 mL/min/1.73   CBC Auto Differential    Collection Time: 07/11/25  1:16 AM    Specimen: Blood   Result Value Ref Range    WBC 5.47 3.40 - 10.80 10*3/mm3    RBC 4.23 4.14 - 5.80 10*6/mm3    Hemoglobin 13.5 13.0 - 17.7 g/dL    Hematocrit 40.0 37.5 - 51.0 %    MCV 94.6 79.0 - 97.0 fL    MCH 31.9 26.6 - 33.0 pg    MCHC 33.8 31.5 - 35.7 g/dL    RDW 12.5 12.3 - 15.4 %    RDW-SD 43.5 37.0 - 54.0 fl    MPV 9.4 6.0 - 12.0 fL    Platelets 204 140 - 450 10*3/mm3    Neutrophil % 54.7 42.7 - 76.0 %    Lymphocyte % 28.7 19.6 - 45.3 %    Monocyte % 12.2 (H) 5.0 - 12.0 %    Eosinophil % 3.7 0.3 - 6.2 %    Basophil % 0.5 0.0 - 1.5 %    Immature Grans % 0.2 0.0 - 0.5 %    Neutrophils, Absolute 2.99 1.70 - 7.00 10*3/mm3    Lymphocytes, Absolute 1.57 0.70 - 3.10 10*3/mm3    Monocytes, Absolute 0.67 0.10 - 0.90 10*3/mm3    Eosinophils, Absolute 0.20 0.00 - 0.40 10*3/mm3    Basophils, Absolute 0.03 0.00 - 0.20 10*3/mm3    Immature Grans, Absolute 0.01 0.00 - 0.05 10*3/mm3    nRBC 0.0 0.0 - 0.2 /100 WBC   Comprehensive Metabolic Panel    Collection Time: 07/12/25  3:53 AM    Specimen: Blood   Result Value Ref Range    Glucose 137 (H) 65 - 99 mg/dL    BUN 11.7 8.0 - 23.0 mg/dL    Creatinine 0.83 0.76 - 1.27 mg/dL    Sodium 146 (H) 136 - 145 mmol/L    Potassium 4.8 3.5 - 5.2 mmol/L    Chloride 98 98 - 107 mmol/L    CO2 22.4 22.0 - 29.0 mmol/L    Calcium 8.8 8.6 - 10.5 mg/dL    Total Protein 6.6 6.0 - 8.5 g/dL    Albumin 3.9 3.5 - 5.2 g/dL    ALT (SGPT) 83 (H) 1 - 41 U/L    AST (SGOT) 69 (H) 1 - 40 U/L     Alkaline Phosphatase 46 39 - 117 U/L    Total Bilirubin 0.9 0.0 - 1.2 mg/dL    Globulin 2.7 gm/dL    A/G Ratio 1.4 g/dL    BUN/Creatinine Ratio 14.1 7.0 - 25.0    Anion Gap 25.6 (H) 5.0 - 15.0 mmol/L    eGFR 91.8 >60.0 mL/min/1.73   CBC Auto Differential    Collection Time: 07/12/25  3:53 AM    Specimen: Blood   Result Value Ref Range    WBC 8.74 3.40 - 10.80 10*3/mm3    RBC 4.42 4.14 - 5.80 10*6/mm3    Hemoglobin 14.3 13.0 - 17.7 g/dL    Hematocrit 41.7 37.5 - 51.0 %    MCV 94.3 79.0 - 97.0 fL    MCH 32.4 26.6 - 33.0 pg    MCHC 34.3 31.5 - 35.7 g/dL    RDW 12.3 12.3 - 15.4 %    RDW-SD 42.8 37.0 - 54.0 fl    MPV 9.4 6.0 - 12.0 fL    Platelets 246 140 - 450 10*3/mm3    Neutrophil % 86.5 (H) 42.7 - 76.0 %    Lymphocyte % 6.3 (L) 19.6 - 45.3 %    Monocyte % 6.8 5.0 - 12.0 %    Eosinophil % 0.0 (L) 0.3 - 6.2 %    Basophil % 0.1 0.0 - 1.5 %    Immature Grans % 0.3 0.0 - 0.5 %    Neutrophils, Absolute 7.56 (H) 1.70 - 7.00 10*3/mm3    Lymphocytes, Absolute 0.55 (L) 0.70 - 3.10 10*3/mm3    Monocytes, Absolute 0.59 0.10 - 0.90 10*3/mm3    Eosinophils, Absolute 0.00 0.00 - 0.40 10*3/mm3    Basophils, Absolute 0.01 0.00 - 0.20 10*3/mm3    Immature Grans, Absolute 0.03 0.00 - 0.05 10*3/mm3    nRBC 0.0 0.0 - 0.2 /100 WBC     FL Cholangiogram Operative  Result Date: 7/11/2025  Impression: Intraoperative cholangiogram. Correlation with findings at time of study suggested. Electronically Signed: Kevon Bartholomew MD  7/11/2025 4:31 PM EDT  Workstation ID: [id number]                                                     Medical Decision Making  My chest x-ray interpretation is no cardiomegaly effusion or infiltrate.  Patient has no evidence of acute coronary syndrome based on EKG and troponin.  CBC shows no leukocytosis no left shift and no anemia.  Metabolic panel shows no renal insufficiency or electrolyte abnormality.  Patient continued pain in the ED.  Restarted on Tridil.  Will be admitted for further cardiac evaluation.  Total  critical care time 45 minutes    Problems Addressed:  Postoperative examination: complicated acute illness or injury    Amount and/or Complexity of Data Reviewed  Labs: ordered. Decision-making details documented in ED Course.  Radiology: ordered and independent interpretation performed.  ECG/medicine tests: ordered and independent interpretation performed.    Risk  Prescription drug management.  Drug therapy requiring intensive monitoring for toxicity.  Decision regarding hospitalization.        Final diagnoses:   Postoperative examination       ED Disposition  ED Disposition       ED Disposition   Decision to Admit    Condition   --    Comment   Level of Care: Telemetry [5]   Admitting Physician: JULIEN BROWNE [[id number]]                 Anton Delcid MD  [address] [facility]  [address]  [place] [address] [phone]    Schedule an appointment as soon as possible for a visit on 7/18/2025  For drain removal, post-op visit    Yvonne Santa MD  [address] [facility] [address]  [place] [address] [phone]    Schedule an appointment as soon as possible for a visit in 1 week(s)           Medication List        New Prescriptions      acetaminophen 325 MG tablet  Commonly known as: Tylenol  Take 2 tablets by mouth Every 4 (Four) Hours As Needed for Moderate Pain for up to 15 doses.     ondansetron 4 MG tablet  Commonly known as: Zofran  Take 1 tablet by mouth Daily As Needed for Nausea or Vomiting.     oxyCODONE 5 MG immediate release tablet  Commonly known as: Roxicodone  Take 1 tablet by mouth Every 8 (Eight) Hours As Needed for Severe Pain for up to 3 days.               Where to Get Your Medications        These medications were sent to [facility]/pharmacy #[id number] - [place], [place] - [address] [address] - [phone] [place] [address]  [address] [place], [place] [address] [address]      Phone: [phone]   acetaminophen 325 MG tablet  ondansetron 4 MG tablet  oxyCODONE 5 MG immediate release tablet            Yvan Triana,  MD  07/08/25 [id number]       Yvan Triana MD  07/13/25 1003

## 2025-07-08 NOTE — PLAN OF CARE
Goal Outcome Evaluation:     DAKOTA Rodrigues at [facility].  Pt on Nitro gtt, started in ED.  Titrating.  Bp tolerating.

## 2025-07-09 ENCOUNTER — APPOINTMENT (OUTPATIENT)
Dept: NUCLEAR MEDICINE | Facility: HOSPITAL | Age: 75
DRG: 418 | End: 2025-07-09
Payer: MEDICARE

## 2025-07-09 ENCOUNTER — APPOINTMENT (OUTPATIENT)
Dept: ULTRASOUND IMAGING | Facility: HOSPITAL | Age: 75
DRG: 418 | End: 2025-07-09
Payer: MEDICARE

## 2025-07-09 ENCOUNTER — APPOINTMENT (OUTPATIENT)
Dept: CARDIOLOGY | Facility: HOSPITAL | Age: 75
DRG: 418 | End: 2025-07-09
Payer: MEDICARE

## 2025-07-09 ENCOUNTER — INPATIENT HOSPITAL (OUTPATIENT)
Dept: URBAN - METROPOLITAN AREA HOSPITAL 84 | Facility: HOSPITAL | Age: 75
End: 2025-07-09
Payer: MEDICARE

## 2025-07-09 DIAGNOSIS — R10.13 EPIGASTRIC PAIN: ICD-10-CM

## 2025-07-09 DIAGNOSIS — D72.829 ELEVATED WHITE BLOOD CELL COUNT, UNSPECIFIED: ICD-10-CM

## 2025-07-09 LAB
ALBUMIN SERPL-MCNC: 4.4 G/DL (ref 3.5–5.2)
ALBUMIN/GLOB SERPL: 2.2 G/DL
ALP SERPL-CCNC: 34 U/L (ref 39–117)
ALT SERPL W P-5'-P-CCNC: 31 U/L (ref 1–41)
AMYLASE SERPL-CCNC: 302 U/L (ref 28–100)
ANION GAP SERPL CALCULATED.3IONS-SCNC: 12.1 MMOL/L (ref 5–15)
ANION GAP SERPL CALCULATED.3IONS-SCNC: 15.3 MMOL/L (ref 5–15)
AORTIC DIMENSIONLESS INDEX: 0.74 (DI)
AST SERPL-CCNC: 31 U/L (ref 1–40)
AV MEAN PRESS GRAD SYS DOP V1V2: 4 MMHG
AV VMAX SYS DOP: 136 CM/SEC
BASOPHILS # BLD AUTO: 0.04 10*3/MM3 (ref 0–0.2)
BASOPHILS # BLD AUTO: 0.04 10*3/MM3 (ref 0–0.2)
BASOPHILS NFR BLD AUTO: 0.3 % (ref 0–1.5)
BASOPHILS NFR BLD AUTO: 0.4 % (ref 0–1.5)
BH CV ECHO LEFT VENTRICLE GLOBAL LONGITUDINAL STRAIN: -18.3 %
BH CV ECHO MEAS - AI P1/2T: 554.5 MSEC
BH CV ECHO MEAS - AO MAX PG: 7.4 MMHG
BH CV ECHO MEAS - AO V2 VTI: 27.2 CM
BH CV ECHO MEAS - AVA(I,D): 2.32 CM2
BH CV ECHO MEAS - EDV(CUBED): 148.9 ML
BH CV ECHO MEAS - EDV(MOD-SP2): 132 ML
BH CV ECHO MEAS - EDV(MOD-SP4): 118 ML
BH CV ECHO MEAS - EF(MOD-SP2): 63.1 %
BH CV ECHO MEAS - EF(MOD-SP4): 59.9 %
BH CV ECHO MEAS - ESV(CUBED): 39.3 ML
BH CV ECHO MEAS - ESV(MOD-SP2): 48.7 ML
BH CV ECHO MEAS - ESV(MOD-SP4): 47.3 ML
BH CV ECHO MEAS - FS: 35.8 %
BH CV ECHO MEAS - IVS/LVPW: 1 CM
BH CV ECHO MEAS - IVSD: 1.1 CM
BH CV ECHO MEAS - LA DIMENSION: 3.5 CM
BH CV ECHO MEAS - LAT PEAK E' VEL: 14.1 CM/SEC
BH CV ECHO MEAS - LV DIASTOLIC VOL/BSA (35-75): 58.6 CM2
BH CV ECHO MEAS - LV MASS(C)D: 227.7 GRAMS
BH CV ECHO MEAS - LV MAX PG: 4 MMHG
BH CV ECHO MEAS - LV MEAN PG: 2 MMHG
BH CV ECHO MEAS - LV SYSTOLIC VOL/BSA (12-30): 23.5 CM2
BH CV ECHO MEAS - LV V1 MAX: 100 CM/SEC
BH CV ECHO MEAS - LV V1 VTI: 20.1 CM
BH CV ECHO MEAS - LVIDD: 5.3 CM
BH CV ECHO MEAS - LVIDS: 3.4 CM
BH CV ECHO MEAS - LVOT AREA: 3.1 CM2
BH CV ECHO MEAS - LVOT DIAM: 2 CM
BH CV ECHO MEAS - LVPWD: 1.1 CM
BH CV ECHO MEAS - MED PEAK E' VEL: 5.4 CM/SEC
BH CV ECHO MEAS - MR MAX PG: 30 MMHG
BH CV ECHO MEAS - MR MAX VEL: 274 CM/SEC
BH CV ECHO MEAS - MV A MAX VEL: 66.5 CM/SEC
BH CV ECHO MEAS - MV DEC SLOPE: 329 CM/SEC2
BH CV ECHO MEAS - MV DEC TIME: 0.27 SEC
BH CV ECHO MEAS - MV E MAX VEL: 62.8 CM/SEC
BH CV ECHO MEAS - MV E/A: 0.94
BH CV ECHO MEAS - MV MAX PG: 3.2 MMHG
BH CV ECHO MEAS - MV MEAN PG: 1 MMHG
BH CV ECHO MEAS - MV P1/2T: 69.1 MSEC
BH CV ECHO MEAS - MV V2 VTI: 23.6 CM
BH CV ECHO MEAS - MVA(P1/2T): 3.2 CM2
BH CV ECHO MEAS - MVA(VTI): 2.7 CM2
BH CV ECHO MEAS - PA ACC TIME: 0.13 SEC
BH CV ECHO MEAS - PA V2 MAX: 166 CM/SEC
BH CV ECHO MEAS - PULM A REVS DUR: 0.12 SEC
BH CV ECHO MEAS - PULM A REVS VEL: 23.4 CM/SEC
BH CV ECHO MEAS - PULM DIAS VEL: 48.8 CM/SEC
BH CV ECHO MEAS - PULM S/D: 1.18
BH CV ECHO MEAS - PULM SYS VEL: 57.5 CM/SEC
BH CV ECHO MEAS - RAP SYSTOLE: 8 MMHG
BH CV ECHO MEAS - RV MAX PG: 3.2 MMHG
BH CV ECHO MEAS - RV V1 MAX: 89.4 CM/SEC
BH CV ECHO MEAS - RV V1 VTI: 13.7 CM
BH CV ECHO MEAS - RVSP: 19.6 MMHG
BH CV ECHO MEAS - SV(LVOT): 63.1 ML
BH CV ECHO MEAS - SV(MOD-SP2): 83.3 ML
BH CV ECHO MEAS - SV(MOD-SP4): 70.7 ML
BH CV ECHO MEAS - SVI(LVOT): 31.4 ML/M2
BH CV ECHO MEAS - SVI(MOD-SP2): 41.4 ML/M2
BH CV ECHO MEAS - SVI(MOD-SP4): 35.1 ML/M2
BH CV ECHO MEAS - TAPSE (>1.6): 2.04 CM
BH CV ECHO MEAS - TR MAX PG: 11.6 MMHG
BH CV ECHO MEAS - TR MAX VEL: 170 CM/SEC
BH CV ECHO MEASUREMENTS AVERAGE E/E' RATIO: 6.44
BH CV REST NUCLEAR ISOTOPE DOSE: 11 MCI
BH CV STRESS BP STAGE 1: NORMAL
BH CV STRESS BP STAGE 2: NORMAL
BH CV STRESS COMMENTS STAGE 1: NORMAL
BH CV STRESS COMMENTS STAGE 2: NORMAL
BH CV STRESS DOSE REGADENOSON STAGE 1: 0.4
BH CV STRESS DURATION MIN STAGE 1: 0
BH CV STRESS DURATION MIN STAGE 2: 4
BH CV STRESS DURATION SEC STAGE 1: 10
BH CV STRESS DURATION SEC STAGE 2: 0
BH CV STRESS HR STAGE 1: 74
BH CV STRESS HR STAGE 2: 80
BH CV STRESS NUCLEAR ISOTOPE DOSE: 32.1 MCI
BH CV STRESS PROTOCOL 1: NORMAL
BH CV STRESS RECOVERY BP: NORMAL MMHG
BH CV STRESS RECOVERY HR: 88 BPM
BH CV STRESS STAGE 1: 1
BH CV STRESS STAGE 2: 2
BH CV XLRA - TDI S': 16.6 CM/SEC
BILIRUB SERPL-MCNC: 1.7 MG/DL (ref 0–1.2)
BUN SERPL-MCNC: 11.7 MG/DL (ref 8–23)
BUN SERPL-MCNC: 11.8 MG/DL (ref 8–23)
BUN/CREAT SERPL: 13.1 (ref 7–25)
BUN/CREAT SERPL: 13.1 (ref 7–25)
CALCIUM SPEC-SCNC: 9.3 MG/DL (ref 8.6–10.5)
CALCIUM SPEC-SCNC: 9.4 MG/DL (ref 8.6–10.5)
CHLORIDE SERPL-SCNC: 102 MMOL/L (ref 98–107)
CHLORIDE SERPL-SCNC: 103 MMOL/L (ref 98–107)
CO2 SERPL-SCNC: 20.7 MMOL/L (ref 22–29)
CO2 SERPL-SCNC: 22.9 MMOL/L (ref 22–29)
CREAT SERPL-MCNC: 0.89 MG/DL (ref 0.76–1.27)
CREAT SERPL-MCNC: 0.9 MG/DL (ref 0.76–1.27)
DEPRECATED RDW RBC AUTO: 43.9 FL (ref 37–54)
DEPRECATED RDW RBC AUTO: 44.5 FL (ref 37–54)
EGFRCR SERPLBLD CKD-EPI 2021: 89.6 ML/MIN/1.73
EGFRCR SERPLBLD CKD-EPI 2021: 89.9 ML/MIN/1.73
EOSINOPHIL # BLD AUTO: 0 10*3/MM3 (ref 0–0.4)
EOSINOPHIL # BLD AUTO: 0.08 10*3/MM3 (ref 0–0.4)
EOSINOPHIL NFR BLD AUTO: 0 % (ref 0.3–6.2)
EOSINOPHIL NFR BLD AUTO: 0.8 % (ref 0.3–6.2)
ERYTHROCYTE [DISTWIDTH] IN BLOOD BY AUTOMATED COUNT: 12.6 % (ref 12.3–15.4)
ERYTHROCYTE [DISTWIDTH] IN BLOOD BY AUTOMATED COUNT: 12.9 % (ref 12.3–15.4)
GLOBULIN UR ELPH-MCNC: 2 GM/DL
GLUCOSE SERPL-MCNC: 116 MG/DL (ref 65–99)
GLUCOSE SERPL-MCNC: 118 MG/DL (ref 65–99)
HCT VFR BLD AUTO: 43.4 % (ref 37.5–51)
HCT VFR BLD AUTO: 43.5 % (ref 37.5–51)
HGB BLD-MCNC: 15 G/DL (ref 13–17.7)
HGB BLD-MCNC: 15.1 G/DL (ref 13–17.7)
IMM GRANULOCYTES # BLD AUTO: 0.03 10*3/MM3 (ref 0–0.05)
IMM GRANULOCYTES # BLD AUTO: 0.04 10*3/MM3 (ref 0–0.05)
IMM GRANULOCYTES NFR BLD AUTO: 0.3 % (ref 0–0.5)
IMM GRANULOCYTES NFR BLD AUTO: 0.3 % (ref 0–0.5)
IVRT: 100 MS
LEFT ATRIUM VOLUME INDEX: 18.6 ML/M2
LIPASE SERPL-CCNC: 816 U/L (ref 13–60)
LV EF 3D SEGMENTATION: 60 %
LV EF BIPLANE MOD: 62.6 %
LYMPHOCYTES # BLD AUTO: 1.19 10*3/MM3 (ref 0.7–3.1)
LYMPHOCYTES # BLD AUTO: 1.58 10*3/MM3 (ref 0.7–3.1)
LYMPHOCYTES NFR BLD AUTO: 16 % (ref 19.6–45.3)
LYMPHOCYTES NFR BLD AUTO: 8.7 % (ref 19.6–45.3)
MAGNESIUM SERPL-MCNC: 1.9 MG/DL (ref 1.6–2.4)
MAXIMAL PREDICTED HEART RATE: 146 BPM
MCH RBC QN AUTO: 32.5 PG (ref 26.6–33)
MCH RBC QN AUTO: 32.7 PG (ref 26.6–33)
MCHC RBC AUTO-ENTMCNC: 34.6 G/DL (ref 31.5–35.7)
MCHC RBC AUTO-ENTMCNC: 34.7 G/DL (ref 31.5–35.7)
MCV RBC AUTO: 93.8 FL (ref 79–97)
MCV RBC AUTO: 94.6 FL (ref 79–97)
MONOCYTES # BLD AUTO: 1.25 10*3/MM3 (ref 0.1–0.9)
MONOCYTES # BLD AUTO: 1.27 10*3/MM3 (ref 0.1–0.9)
MONOCYTES NFR BLD AUTO: 12.8 % (ref 5–12)
MONOCYTES NFR BLD AUTO: 9.2 % (ref 5–12)
NEUTROPHILS NFR BLD AUTO: 11.11 10*3/MM3 (ref 1.7–7)
NEUTROPHILS NFR BLD AUTO: 6.9 10*3/MM3 (ref 1.7–7)
NEUTROPHILS NFR BLD AUTO: 69.7 % (ref 42.7–76)
NEUTROPHILS NFR BLD AUTO: 81.5 % (ref 42.7–76)
NRBC BLD AUTO-RTO: 0 /100 WBC (ref 0–0.2)
NRBC BLD AUTO-RTO: 0 /100 WBC (ref 0–0.2)
PERCENT MAX PREDICTED HR: 63.01 %
PHOSPHATE SERPL-MCNC: 3.6 MG/DL (ref 2.5–4.5)
PLATELET # BLD AUTO: 230 10*3/MM3 (ref 140–450)
PLATELET # BLD AUTO: 234 10*3/MM3 (ref 140–450)
PMV BLD AUTO: 9.8 FL (ref 6–12)
PMV BLD AUTO: 9.9 FL (ref 6–12)
POTASSIUM SERPL-SCNC: 3.8 MMOL/L (ref 3.5–5.2)
POTASSIUM SERPL-SCNC: 4.1 MMOL/L (ref 3.5–5.2)
PROT SERPL-MCNC: 6.4 G/DL (ref 6–8.5)
QT INTERVAL: 362 MS
QT INTERVAL: 422 MS
QT INTERVAL: 445 MS
QTC INTERVAL: 428 MS
QTC INTERVAL: 431 MS
QTC INTERVAL: 463 MS
RBC # BLD AUTO: 4.59 10*6/MM3 (ref 4.14–5.8)
RBC # BLD AUTO: 4.64 10*6/MM3 (ref 4.14–5.8)
SINUS: 3.3 CM
SODIUM SERPL-SCNC: 137 MMOL/L (ref 136–145)
SODIUM SERPL-SCNC: 139 MMOL/L (ref 136–145)
SPECT HRT GATED+EF W RNC IV: 59 %
STJ: 2.3 CM
STRESS BASELINE BP: NORMAL MMHG
STRESS BASELINE HR: 74 BPM
STRESS PERCENT HR: 74 %
STRESS POST PEAK BP: NORMAL MMHG
STRESS POST PEAK HR: 92 BPM
STRESS TARGET HR: 124 BPM
WBC NRBC COR # BLD AUTO: 13.63 10*3/MM3 (ref 3.4–10.8)
WBC NRBC COR # BLD AUTO: 9.9 10*3/MM3 (ref 3.4–10.8)

## 2025-07-09 PROCEDURE — 93018 CV STRESS TEST I&R ONLY: CPT | Performed by: INTERNAL MEDICINE

## 2025-07-09 PROCEDURE — 82150 ASSAY OF AMYLASE: CPT | Performed by: PHYSICIAN ASSISTANT

## 2025-07-09 PROCEDURE — 84100 ASSAY OF PHOSPHORUS: CPT

## 2025-07-09 PROCEDURE — 86140 C-REACTIVE PROTEIN: CPT | Performed by: PHYSICIAN ASSISTANT

## 2025-07-09 PROCEDURE — 25010000002 ENOXAPARIN PER 10 MG

## 2025-07-09 PROCEDURE — 93306 TTE W/DOPPLER COMPLETE: CPT | Performed by: INTERNAL MEDICINE

## 2025-07-09 PROCEDURE — 93010 ELECTROCARDIOGRAM REPORT: CPT | Performed by: INTERNAL MEDICINE

## 2025-07-09 PROCEDURE — 93017 CV STRESS TEST TRACING ONLY: CPT

## 2025-07-09 PROCEDURE — 34310000005 TECHNETIUM TETROFOSMIN KIT: Performed by: INTERNAL MEDICINE

## 2025-07-09 PROCEDURE — 80053 COMPREHEN METABOLIC PANEL: CPT

## 2025-07-09 PROCEDURE — A9502 TC99M TETROFOSMIN: HCPCS | Performed by: INTERNAL MEDICINE

## 2025-07-09 PROCEDURE — 78452 HT MUSCLE IMAGE SPECT MULT: CPT

## 2025-07-09 PROCEDURE — 93356 MYOCRD STRAIN IMG SPCKL TRCK: CPT | Performed by: INTERNAL MEDICINE

## 2025-07-09 PROCEDURE — 78452 HT MUSCLE IMAGE SPECT MULT: CPT | Performed by: INTERNAL MEDICINE

## 2025-07-09 PROCEDURE — 85025 COMPLETE CBC W/AUTO DIFF WBC: CPT

## 2025-07-09 PROCEDURE — 99222 1ST HOSP IP/OBS MODERATE 55: CPT | Performed by: INTERNAL MEDICINE

## 2025-07-09 PROCEDURE — 76705 ECHO EXAM OF ABDOMEN: CPT

## 2025-07-09 PROCEDURE — 25010000002 REGADENOSON 0.4 MG/5ML SOLUTION: Performed by: INTERNAL MEDICINE

## 2025-07-09 PROCEDURE — 83735 ASSAY OF MAGNESIUM: CPT

## 2025-07-09 PROCEDURE — 83690 ASSAY OF LIPASE: CPT | Performed by: PHYSICIAN ASSISTANT

## 2025-07-09 PROCEDURE — 99223 1ST HOSP IP/OBS HIGH 75: CPT | Mod: FS | Performed by: PHYSICIAN ASSISTANT

## 2025-07-09 PROCEDURE — 93005 ELECTROCARDIOGRAM TRACING: CPT

## 2025-07-09 PROCEDURE — 93306 TTE W/DOPPLER COMPLETE: CPT

## 2025-07-09 PROCEDURE — 93356 MYOCRD STRAIN IMG SPCKL TRCK: CPT

## 2025-07-09 PROCEDURE — 76376 3D RENDER W/INTRP POSTPROCES: CPT | Performed by: INTERNAL MEDICINE

## 2025-07-09 RX ORDER — DOXEPIN HYDROCHLORIDE 10 MG/1
10 CAPSULE ORAL NIGHTLY
Status: DISCONTINUED | OUTPATIENT
Start: 2025-07-09 | End: 2025-07-12 | Stop reason: HOSPADM

## 2025-07-09 RX ORDER — OXYBUTYNIN CHLORIDE 10 MG/1
10 TABLET, EXTENDED RELEASE ORAL DAILY
Status: DISCONTINUED | OUTPATIENT
Start: 2025-07-09 | End: 2025-07-12 | Stop reason: HOSPADM

## 2025-07-09 RX ORDER — SODIUM CHLORIDE 9 MG/ML
30 INJECTION, SOLUTION INTRAVENOUS CONTINUOUS PRN
OUTPATIENT
Start: 2025-07-09 | End: 2025-07-10

## 2025-07-09 RX ORDER — TAMSULOSIN HYDROCHLORIDE 0.4 MG/1
0.4 CAPSULE ORAL NIGHTLY
Status: DISCONTINUED | OUTPATIENT
Start: 2025-07-09 | End: 2025-07-12 | Stop reason: HOSPADM

## 2025-07-09 RX ORDER — REGADENOSON 0.08 MG/ML
0.4 INJECTION, SOLUTION INTRAVENOUS
Status: COMPLETED | OUTPATIENT
Start: 2025-07-09 | End: 2025-07-09

## 2025-07-09 RX ADMIN — REGADENOSON 0.4 MG: 0.08 INJECTION, SOLUTION INTRAVENOUS at 10:44

## 2025-07-09 RX ADMIN — Medication 10 ML: at 08:11

## 2025-07-09 RX ADMIN — TETROFOSMIN 1 DOSE: 1.38 INJECTION, POWDER, LYOPHILIZED, FOR SOLUTION INTRAVENOUS at 10:44

## 2025-07-09 RX ADMIN — DOXEPIN HYDROCHLORIDE 10 MG: 10 CAPSULE ORAL at 21:08

## 2025-07-09 RX ADMIN — TAMSULOSIN HYDROCHLORIDE 0.4 MG: 0.4 CAPSULE ORAL at 23:06

## 2025-07-09 RX ADMIN — PANTOPRAZOLE SODIUM 40 MG: 40 INJECTION, POWDER, FOR SOLUTION INTRAVENOUS at 21:08

## 2025-07-09 RX ADMIN — Medication 10 ML: at 21:09

## 2025-07-09 RX ADMIN — TETROFOSMIN 1 DOSE: 1.38 INJECTION, POWDER, LYOPHILIZED, FOR SOLUTION INTRAVENOUS at 09:51

## 2025-07-09 RX ADMIN — ENOXAPARIN SODIUM 90 MG: 100 INJECTION SUBCUTANEOUS at 05:21

## 2025-07-09 NOTE — NURSING NOTE
CT tech requesting the ct abd/pelvis be changed to with contrast secondary to a ct chest being added by provider. Jennyfer MIX gave the ok and CT tech was changing the order.

## 2025-07-09 NOTE — PROGRESS NOTES
"Hospitalist Service   Daily Progress Note      Patient Name: Giovani Moseley  : 1950  MRN: [id number]  Primary Care Physician:  Yvonne Santa MD  Date of admission: 2025      Subjective        Patient seen and examined this morning.  Feeling much better this morning, no further significant chest pain reported.  No nausea or vomiting.  Overall feels better.  NST pending, echo pending.      ROS: Pertinent positives as noted in HPI/subjective.  All other systems were reviewed and are negative.      Objective      Vitals:   Temp:  [97.6 °F (36.4 °C)-98.7 °F (37.1 °C)] 98.4 °F (36.9 °C)  Heart Rate:  [] 85  Resp:  [16-23] 20  BP: (118-180)/() 118/68    Physical Exam:    General: Awake, alert, NAD  Eyes: PERRL, EOMI, conjunctivae are clear  Cardiovascular: Regular rate and rhythm, no murmurs  Respiratory: Clear to auscultation bilaterally, no wheezing or rales, unlabored breathing  Abdomen: Soft, nontender, positive bowel sounds, no guarding  Neurologic: A&O, CN grossly intact, moves all extremities spontaneously  Musculoskeletal: Normal range of motion, no other gross deformities  Skin: Warm, dry         Result Review    Result Review:  I have personally reviewed the results from the time of this admission to 2025 09:32 EDT and agree with these findings:  []  Laboratory  []  Microbiology  []  Radiology  []  EKG/Telemetry   []  Cardiology/Vascular   []  Pathology  []  Old records  []  Other:          Assessment & Plan      Brief Patient Summary:  Giovani Moseley is a 75 y.o. male with a CMH of AAA without rupture, hypertension, hyperlipidemia, OAB, BPH, leg cramps who presented to [facility] on 2025 with chest pressure.  Patient reported he has had symptoms all day, describes as \" a lot of discomfort, feels like pressure up here\" pointing to epigastric region.  Patient reported for the past couple of weeks he has been having leg cramping at nighttime, so he cut back on his dose of " losartan and nexlizet to half dose, and reported sometimes he skips doses.  Patient reported he vomited 3 times in the emergency department.  ACS ruled out, troponin negative.  Due to AAA history, CTA chest abdomen pelvis negative for any acute findings.  Cardiology consulted.  Stress test and echo ordered.      amLODIPine, 2.5 mg, Oral, Q24H  enoxaparin sodium, 1 mg/kg, Subcutaneous, BID  losartan, 25 mg, Oral, Q24H  oxybutynin XL, 10 mg, Oral, Daily  pantoprazole, 40 mg, Intravenous, Nightly  sodium chloride, 10 mL, Intravenous, Q12H  tamsulosin, 0.4 mg, Oral, Nightly             I have utilized all available, immediate resources to obtain, update, or review the patient's current medications including all prescriptions, over-the-counter products, herbals, cannabis/cannabidiol products, and vitamin.mineral/dietary (nutritional) supplements.    Active Hospital Problems:  Active Hospital Problems    Diagnosis     **Unstable angina        Assessment/Plan:     Substernal chest pain/pressure  -ACS ruled out, troponin negative  -CTA chest abdomen pelvis negative for any acute findings  -Echo and stress test pending  -Continue aspirin, statin  -Cardiology evaluation pending today    Hypertension  Hyperlipidemia  BPH  -Continue home meds as tolerated, monitor    VTE Prophylaxis:  Pharmacologic VTE prophylaxis orders are present.        CODE STATUS:    Code Status (Patient has no pulse and is not breathing): CPR (Attempt to Resuscitate)  Medical Interventions (Patient has pulse or is breathing): Full Support  Level Of Support Discussed With: Patient      Disposition Planning:     Barriers to Discharge: Stress test, cardiac evaluation  Anticipated Date of Discharge: 7/10  Place of Discharge: Home    Electronically signed by Salvador Pimentel DO, 07/09/25, 09:32 EDT.  [facility] Hospitalist Team      Part of this note may be an electronic transcription/translation of spoken language to printed text using the Dragon Dictation  System.

## 2025-07-09 NOTE — NURSING NOTE
Spoke with CT tech, ct tech inquiring on when patient can come down. This nurse informed her patient could come down immediately, ct tech is putting in for transport for patient to be transported via bed.

## 2025-07-09 NOTE — PLAN OF CARE
Goal Outcome Evaluation:  Plan of Care Reviewed With: patient        Progress: no change             Patient alert and able to make needs known. Patient just found standing at the side of the bed attempting to use the urinal unassisted. This nurse was alerted to the patient's room by heart monitor with a pulse rate in the 130's. Patient was struggling to get back into the bed when this nurse entered the room. PCA and this nurse assisted patient to the bed without incident and pulled him up in the bed repositioning him for comfort. Patient reports his chest/abdomen/flank/right shoulder pain is not worse but not better. Patient reports his nausea has improved post compazine. Once back in bed and several minutes had passed patient's heart rate settled to 90-95 bpm. Patient has been NPO since midnight. Call light in reach and bed alarm turned on secondary to patient being found up at the side of the bed after receiving multiple doses of iv valium. Plans of care on going.

## 2025-07-09 NOTE — NURSING NOTE
Patient is reporting chest pain is unrelieved by the nitro gtt, which is running at 40mcg/,minute. Patient also reporting pain, first identified as chest pain is moving down in his abdomen. Upon assessment patient has hiccups, and is reporting all of this started after he ate lunch today. Patient states he had a normal formed bowel movement this morning and ate a chicken salad sandwich for lunch. Patient is verbalizing severe anxiety stating he does believe the anxiety is worsening the pain. This nurse noted a documented history of AAA on his chart and did not see any abdominal imaging performed yet this admission. This nurse reached out to on call provider reporting all current complaints. On call provider MARIA DEL ROSARIO Mccray added the APRN that assessed patient previously to the chat. All pertinent information was relayed and new orders received to stop the nitro gtt, added prn morphine, and a STAT abdominal?aorta ultrasound was ordered. While this nurse was at the nurses station communicating with providers the wife approached reporting patient is now reporting the pain is acutely in his right shoulder. Providers also made aware of this complaint and a stat EKG was ordered. Wife and patient updated on current plans of care and changes in orders. Providers also state they will come to the bedside to speak with wife and patient in hopes of relieving some anxiety.

## 2025-07-09 NOTE — CASE MANAGEMENT/SOCIAL WORK
Discharge Planning Assessment   [facility]     Patient Name: Giovani Moseley  MRN: [id number]  Today's Date: 7/9/2025    Admit Date: 7/8/2025    Plan: Routine home with spouse   Discharge Needs Assessment       Row Name 07/09/25 1059       Living Environment    People in Home spouse    Name(s) of People in Home [place]    Current Living Arrangements home    Potentially Unsafe Housing Conditions none    In the past 12 months has the electric, gas, oil, or water company threatened to shut off services in your home? No    Primary Care Provided by self    Provides Primary Care For no one    Family Caregiver if Needed spouse    Family Caregiver Names [place]ia    Quality of Family Relationships helpful;involved    Able to Return to Prior Arrangements yes       Resource/Environmental Concerns    Resource/Environmental Concerns none    Transportation Concerns none       Transportation Needs    In the past 12 months, has lack of transportation kept you from medical appointments or from getting medications? no    In the past 12 months, has lack of transportation kept you from meetings, work, or from getting things needed for daily living? No       Food Insecurity    Within the past 12 months, you worried that your food would run out before you got the money to buy more. Never true    Within the past 12 months, the food you bought just didn't last and you didn't have money to get more. Never true       Transition Planning    Patient/Family Anticipates Transition to home with family    Patient/Family Anticipated Services at Transition none    Transportation Anticipated family or friend will provide       Discharge Needs Assessment    Readmission Within the Last 30 Days no previous admission in last 30 days    Equipment Currently Used at Home cpap;bp cuff;pulse ox    Concerns to be Addressed denies needs/concerns at this time    Do you want help finding or keeping work or a job? Patient declined    Do you want help with school or training?  For example, starting or completing job training or getting a high school diploma, GED or equivalent Patient declined    Anticipated Changes Related to Illness none    Equipment Needed After Discharge none                   Discharge Plan       Row Name 07/09/25 1100       Plan    Plan Routine home with spouse    Patient/Family in Agreement with Plan yes    Plan Comments CM met with patient's wife [place]ia at bedside due to patient being off the unit for stress test. Confirmed PCP, insurance, and pharmacy.  Meds to beds enrolled. Patient has a CPAP but unsure of company. Patient denies any difficulty affording medications. Patient not current with any therapies. Patient performs ADL independently. Confirmed transportation at discharge will be his wife. D/C Barriers: stress test, cardiology consult                  Demographic Summary       Row Name 07/09/25 1057       General Information    Admission Type inpatient    Arrived From emergency department    Referral Source admission list    Reason for Consult discharge planning    Preferred Language English       Contact Information    Permission Granted to Share Info With                    Functional Status       Row Name 07/09/25 1058       Functional Status    Usual Activity Tolerance good    Current Activity Tolerance good       Functional Status, IADL    Medications independent    Meal Preparation independent    Housekeeping independent    Laundry independent    Shopping independent    If for any reason you need help with day-to-day activities such as bathing, preparing meals, shopping, managing finances, etc., do you get the help you need? I get all the help I need               Lorelei Sosa RN     Office: [phone]

## 2025-07-09 NOTE — CONSULTS
Referring Provider: Salvador Pimentel DO    Reason for Consultation:  unstable angina      Patient Care Team:  Yvonne Santa MD as PCP - General (Family Medicine)  Patrick Crane MD as Consulting Physician (Urology)  Seipel, Joseph F, MD as Consulting Physician (Sleep Medicine)  Winnie Farley OD (Optometry)  Brandon Barrios MD as Consulting Physician (Cardiology)  Jacqui David MD as Consulting Physician (Dermatology)  Hollis Junior MD as Consulting Physician (Neurosurgery)  Michael Munoz MD as Consulting Physician (Urology)  Tl Huang, RAMOS as Ambulatory  ([facility])      SUBJECTIVE     Chief Complaint:  chest pain    History of present illness:  Giovani Moseley is a 75 y.o. male with a history of AAA without rupture, hypertension, hyperlipidemia, OAB, BPH, leg cramps  who presented to [facility] with complaint of chest pressure. Patient reported he felt sudden chest pressure in the epigastric region yesterday. He denies every having GERD or reflux before. On exam the patient is comfortable in bed without acute distress. He denies any current chest pain, palpitations or shortness of breath. Nitro drip was started and is now off. CT abdomen,pelvis and CTA chest negative, chest xray negative. Troponin also negative    Previous cardiac history includes   Echo 11/2019 Left ventricular systolic function is normal. Calculated EF = 69.0%. Estimated EF was in agreement with the calculated EF. Normal left ventricular cavity size noted. Left ventricular wall thickness is consistent with mild concentric hypertrophy. Left ventricular diastolic function is normal.  The valve appears trileaflet. The aortic valve is abnormal in structure. There is mild calcification of the aortic valve mainly affecting the non, left and right coronary cusp(s).Trace-to-mild aortic valve regurgitation is present. No aortic valve stenosis is present.  The mitral valve is grossly  -- CST Phone (Incoming) Suzie Crowder [phone]            normal in structure. Mild mitral valve regurgitation is present.  The tricuspid valve is grossly normal. Mild tricuspid valve regurgitation is present. Estimated right ventricular systolic pressure from tricuspid regurgitation is normal (<35 mmHg).   Coronary CT 3/2024   Left main (LM): 0  Left anterior descending (LAD): 49.6  Left circumflex (CX): 0  Right coronary artery (RCA): 38.3  Total: 87.9    Chest pain very atypical continuous in nature without cardiac biomarker elevation or EKG changes  Reassurance discussed with the patient and will pursue noninvasive stress testing and if unremarkable would consult GI, he sees Dr. Benitez  Blood pressures are much better today  Chest pain still continuous off nitro drip again without biomarker, EKG changes hemodynamic or electrical instability      Review of Systems   Constitutional:  Negative for activity change, appetite change and fatigue.   HENT: Negative.     Eyes: Negative.    Respiratory:  Positive for chest tightness. Negative for cough, shortness of breath, wheezing and stridor.    Cardiovascular:  Negative for chest pain, palpitations and leg swelling.   Gastrointestinal:  Negative for abdominal distention, abdominal pain, nausea and indigestion.   Endocrine: Negative.    Genitourinary: Negative.    Musculoskeletal: Negative.    Skin:  Negative for color change and pallor.   Allergic/Immunologic: Negative.    Neurological:  Negative for dizziness, weakness, light-headedness and headache.   Hematological: Negative.    Psychiatric/Behavioral: Negative.               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 Stephens    COLONOSCOPY      EYE SURGERY Bilateral 10/3 and 10/15    PROSTATE SURGERY  Dr. Payne ([place])    2019    TONSILLECTOMY         Family History   Problem Relation Age of Onset    Cancer Mother         Breast/ Pancreatic/    Pancreatic cancer Mother     Vision loss Mother         Mascular    Cancer Father         Prostrate/    Alzheimer's disease Father     Cancer Brother         lung cancer (alive)    Kidney disease Brother        Social History     Tobacco Use    Smoking status: Former     Current packs/day: 0.00     Average packs/day: 1 pack/day for 35.0 years (35.0 ttl pk-yrs)     Types: Cigarettes     Start date: 1965     Quit date: 2000     Years since quittin.5     Passive exposure: Past    Smokeless tobacco: Never    Tobacco comments:     Stopped roughly in beginning of . light to moderate smoker throughout my history   Vaping Use    Vaping status: Never Used   Substance Use Topics    Alcohol use: Yes     Alcohol/week: 1.0 standard drink of alcohol     Types: 1 Cans of beer per week     Comment: light drinker when socializing    Drug use: No        Home meds:  Prior to Admission medications    Medication Sig Start Date End Date Taking? Authorizing Provider   Bempedoic Acid-Ezetimibe 180-10 MG tablet Take 1 tablet by mouth Daily. 25  Yes Brandon Barrios MD   Coenzyme Q10 (CoQ10) 100 MG capsule Take 1 capsule by mouth Daily. 10/6/21  Yes Provider, MD Alejo   doxepin (SINEquan) 10 MG capsule Take 1 capsule by mouth As Needed for Sleep. prn 25  Yes Seipel, Joseph F, MD   losartan (COZAAR) 25 MG tablet Take 1 tablet by mouth Daily. 25  Yes Brandon Barrios MD  "  silodosin (RAPAFLO) 8 MG capsule capsule Take 1 capsule by mouth Daily. 10/8/20  Yes Provider, MD Alejo   Vibegron (Gemtesa) 75 MG tablet Take 1 tablet by mouth Daily. 11/21/24  Yes Provider, Alejo, MD       Allergies:     Patient has no known allergies.    Scheduled Meds:amLODIPine, 2.5 mg, Oral, Q24H  enoxaparin sodium, 1 mg/kg, Subcutaneous, BID  losartan, 25 mg, Oral, Q24H  oxybutynin XL, 10 mg, Oral, Daily  pantoprazole, 40 mg, Intravenous, Nightly  sodium chloride, 10 mL, Intravenous, Q12H  tamsulosin, 0.4 mg, Oral, Nightly      Continuous Infusions:   PRN Meds:  acetaminophen **OR** acetaminophen **OR** acetaminophen    senna-docusate sodium **AND** polyethylene glycol **AND** bisacodyl **AND** bisacodyl    Calcium Replacement - Follow Nurse / BPA Driven Protocol    Magnesium Standard Dose Replacement - Follow Nurse / BPA Driven Protocol    Morphine    nitroglycerin    ondansetron    Phosphorus Replacement - Follow Nurse / BPA Driven Protocol    Potassium Replacement - Follow Nurse / BPA Driven Protocol    [COMPLETED] Insert Peripheral IV **AND** sodium chloride    sodium chloride      OBJECTIVE    Vital Signs  Vitals:    07/08/25 2131 07/09/25 0005 07/09/25 0344 07/09/25 0753   BP: 147/77 133/77 128/74 118/68   BP Location: Right arm Right arm Right arm Right arm   Patient Position: Lying Lying Lying Lying   Pulse: 63 102 106 85   Resp: 23 20 19 20   Temp: 98.2 °F (36.8 °C) 97.8 °F (36.6 °C) 98.7 °F (37.1 °C) 98.4 °F (36.9 °C)   TempSrc: Oral Oral Oral Oral   SpO2: 98% 94% 95% 94%   Weight:   90.1 kg (198 lb 10.2 oz)    Height:           Flowsheet Rows      Flowsheet Row First Filed Value   Admission Height 170.2 cm (67\") Documented at 07/08/2025 1350   Admission Weight 89.4 kg (197 lb) Documented at 07/08/2025 1350              Intake/Output Summary (Last 24 hours) at 7/9/2025 1002  Last data filed at 7/9/2025 0629  Gross per 24 hour   Intake --   Output 1150 ml   Net -1150 ml        Telemetry: "  Sinus rhythm     Physical Exam     Constitutional:       General: He is not in acute distress.     Appearance: Normal appearance. .   HENT:      Head: Normocephalic and atraumatic.   Eyes:      Extraocular Movements: Extraocular movements intact.      Pupils: Pupils are equal, round, and reactive to light.   Cardiovascular:      Rate and Rhythm: Normal rate and regular rhythm.   Pulmonary:      Effort: Pulmonary effort is normal.      Breath sounds: Normal breath sounds.   Abdominal:      General: Abdomen not distended. Bowel sounds are normal.      Palpations: Abdomen is soft.   Musculoskeletal:      Cervical back: Normal range of motion.   Skin:     General: Skin is warm and dry.   Neurological:      General: No focal deficit present.      Mental Status: He is alert and oriented to person, place, and time. Mental status is at baseline.   Psychiatric:         Mood and Affect: Mood normal.         Behavior: Behavior normal.         Thought Content: Thought content normal.   Scribe findings above  Results Review:  I have personally reviewed the results from the time of this admission to 7/9/2025 10:02 EDT and agree with these findings:  [x]  Laboratory  [x]  Microbiology  [x]  Radiology  [x]  EKG/Telemetry   [x]  Cardiology/Vascular   [x]  Pathology  [x]  Old records  []  Other:    Most notable findings include:     Lab Results (last 24 hours)       Procedure Component Value Units Date/Time    Basic Metabolic Panel [[id number]]  (Abnormal) Collected: 07/09/25 0443    Specimen: Blood from Arm, Left Updated: 07/09/25 0524     Glucose 118 mg/dL      BUN 11.8 mg/dL      Creatinine 0.90 mg/dL      Sodium 137 mmol/L      Potassium 3.8 mmol/L      Comment: Specimen hemolyzed.  Result may be falsely elevated.        Chloride 102 mmol/L      CO2 22.9 mmol/L      Calcium 9.3 mg/dL      BUN/Creatinine Ratio 13.1     Anion Gap 12.1 mmol/L      eGFR 89.6 mL/min/1.73     Narrative:      GFR Categories in Chronic Kidney Disease  (CKD)              GFR Category          GFR (mL/min/1.73)    Interpretation  G1                    90 or greater        Normal or high (1)  G2                    60-89                Mild decrease (1)  G3a                   45-59                Mild to moderate decrease  G3b                   30-44                Moderate to severe decrease  G4                    15-29                Severe decrease  G5                    14 or less           Kidney failure    (1)In the absence of evidence of kidney disease, neither GFR category G1 or G2 fulfill the criteria for CKD.    eGFR calculation 2021 CKD-EPI creatinine equation, which does not include race as a factor    Magnesium [[id number]]  (Normal) Collected: 07/09/25 0443    Specimen: Blood from Arm, Left Updated: 07/09/25 0524     Magnesium 1.9 mg/dL     Phosphorus [[id number]]  (Normal) Collected: 07/09/25 0443    Specimen: Blood from Arm, Left Updated: 07/09/25 0517     Phosphorus 3.6 mg/dL     CBC & Differential [[id number]]  (Abnormal) Collected: 07/09/25 0443    Specimen: Blood from Arm, Left Updated: 07/09/25 0509    Narrative:      The following orders were created for panel order CBC & Differential.  Procedure                               Abnormality         Status                     ---------                               -----------         ------                     CBC Auto Differential[[id number]]        Abnormal            Final result                 Please view results for these tests on the individual orders.    CBC Auto Differential [[id number]]  (Abnormal) Collected: 07/09/25 0443    Specimen: Blood from Arm, Left Updated: 07/09/25 0509     WBC 13.63 10*3/mm3      RBC 4.59 10*6/mm3      Hemoglobin 15.0 g/dL      Hematocrit 43.4 %      MCV 94.6 fL      MCH 32.7 pg      MCHC 34.6 g/dL      RDW 12.6 %      RDW-SD 43.9 fl      MPV 9.8 fL      Platelets 230 10*3/mm3      Neutrophil % 81.5 %      Lymphocyte % 8.7 %      Monocyte % 9.2 %      Eosinophil %  0.0 %      Basophil % 0.3 %      Immature Grans % 0.3 %      Neutrophils, Absolute 11.11 10*3/mm3      Lymphocytes, Absolute 1.19 10*3/mm3      Monocytes, Absolute 1.25 10*3/mm3      Eosinophils, Absolute 0.00 10*3/mm3      Basophils, Absolute 0.04 10*3/mm3      Immature Grans, Absolute 0.04 10*3/mm3      nRBC 0.0 /100 WBC     Lipid Panel [[id number]] Collected: 07/08/25 1521    Specimen: Blood Updated: 07/08/25 1712     Total Cholesterol 134 mg/dL      Triglycerides 141 mg/dL      HDL Cholesterol 47 mg/dL      LDL Cholesterol  63 mg/dL      VLDL Cholesterol 24 mg/dL      LDL/HDL Ratio 1.25    Narrative:      Cholesterol Reference Ranges  (U.S. Department of Health and Human Services ATP III Classifications)    Desirable          <200 mg/dL  Borderline High    200-239 mg/dL  High Risk          >240 mg/dL      Triglyceride Reference Ranges  (U.S. Department of Health and Human Services ATP III Classifications)    Normal           <150 mg/dL  Borderline High  150-199 mg/dL  High             200-499 mg/dL  Very High        >500 mg/dL    HDL Reference Ranges  (U.S. Department of Health and Human Services ATP III Classifications)    Low     <40 mg/dl (major risk factor for CHD)  High    >60 mg/dl ('negative' risk factor for CHD)        LDL Reference Ranges  (U.S. Department of Health and Human Services ATP III Classifications)    Optimal          <100 mg/dL  Near Optimal     100-129 mg/dL  Borderline High  130-159 mg/dL  High             160-189 mg/dL  Very High        >189 mg/dL    LDL is calculated using the NIH LDL-C calculation.      TSH [[id number]]  (Normal) Collected: 07/08/25 1521    Specimen: Blood Updated: 07/08/25 1712     TSH 1.920 uIU/mL     Hemoglobin A1c [[id number]]  (Abnormal) Collected: 07/08/25 1413    Specimen: Blood Updated: 07/08/25 1706     Hemoglobin A1C 5.81 %     Narrative:      Hemoglobin A1C Ranges:    Increased Risk for Diabetes  5.7% to 6.4%  Diabetes                     >= 6.5%  Diabetic Goal                 < 7.0%    High Sensitivity Troponin T 1Hr [[id number]]  (Normal) Collected: 07/08/25 1521    Specimen: Blood Updated: 07/08/25 1554     HS Troponin T 8 ng/L      Troponin T Numeric Delta -1 ng/L     Narrative:      High Sensitive Troponin T Reference Range:  <14.0 ng/L- Negative Female for AMI  <22.0 ng/L- Negative Male for AMI  >=14 - Abnormal Female indicating possible myocardial injury.  >=22 - Abnormal Male indicating possible myocardial injury.   Clinicians would have to utilize clinical acumen, EKG, Troponin, and serial changes to determine if it is an Acute Myocardial Infarction or myocardial injury due to an underlying chronic condition.         High Sensitivity Troponin T [[id number]]  (Normal) Collected: 07/08/25 1413    Specimen: Blood Updated: 07/08/25 1446     HS Troponin T 9 ng/L     Narrative:      High Sensitive Troponin T Reference Range:  <14.0 ng/L- Negative Female for AMI  <22.0 ng/L- Negative Male for AMI  >=14 - Abnormal Female indicating possible myocardial injury.  >=22 - Abnormal Male indicating possible myocardial injury.   Clinicians would have to utilize clinical acumen, EKG, Troponin, and serial changes to determine if it is an Acute Myocardial Infarction or myocardial injury due to an underlying chronic condition.         Basic Metabolic Panel [[id number]]  (Normal) Collected: 07/08/25 1413    Specimen: Blood Updated: 07/08/25 1446     Glucose 95 mg/dL      BUN 14.2 mg/dL      Creatinine 0.88 mg/dL      Sodium 140 mmol/L      Potassium 4.0 mmol/L      Chloride 103 mmol/L      CO2 26.5 mmol/L      Calcium 9.5 mg/dL      BUN/Creatinine Ratio 16.1     Anion Gap 10.5 mmol/L      eGFR 90.2 mL/min/1.73     Narrative:      GFR Categories in Chronic Kidney Disease (CKD)              GFR Category          GFR (mL/min/1.73)    Interpretation  G1                    90 or greater        Normal or high (1)  G2                    60-89                Mild decrease (1)  G3a                    45-59                Mild to moderate decrease  G3b                   30-44                Moderate to severe decrease  G4                    15-29                Severe decrease  G5                    14 or less           Kidney failure    (1)In the absence of evidence of kidney disease, neither GFR category G1 or G2 fulfill the criteria for CKD.    eGFR calculation 2021 CKD-EPI creatinine equation, which does not include race as a factor    Extra Tubes [[id number]] Collected: 07/08/25 1413    Specimen: Blood, Venous Line Updated: 07/08/25 1430    Narrative:      The following orders were created for panel order Extra Tubes.  Procedure                               Abnormality         Status                     ---------                               -----------         ------                     Gold Top - SST[[id number]]                                   Final result               Light Blue Top[[id number]]                                   Final result                 Please view results for these tests on the individual orders.    Gold Top - SST [[id number]] Collected: 07/08/25 1413    Specimen: Blood Updated: 07/08/25 1430     Extra Tube Hold for add-ons.     Comment: Auto resulted.       Light Blue Top [[id number]] Collected: 07/08/25 1413    Specimen: Blood Updated: 07/08/25 1430     Extra Tube Hold for add-ons.     Comment: Auto resulted       CBC & Differential [[id number]]  (Normal) Collected: 07/08/25 1413    Specimen: Blood Updated: 07/08/25 1421    Narrative:      The following orders were created for panel order CBC & Differential.  Procedure                               Abnormality         Status                     ---------                               -----------         ------                     CBC Auto Differential[[id number]]        Normal              Final result                 Please view results for these tests on the individual orders.    CBC Auto Differential [[id number]]  (Normal)  Collected: 07/08/25 1413    Specimen: Blood Updated: 07/08/25 1421     WBC 5.91 10*3/mm3      RBC 4.54 10*6/mm3      Hemoglobin 14.7 g/dL      Hematocrit 42.9 %      MCV 94.5 fL      MCH 32.4 pg      MCHC 34.3 g/dL      RDW 12.6 %      RDW-SD 43.6 fl      MPV 9.4 fL      Platelets 243 10*3/mm3      Neutrophil % 60.6 %      Lymphocyte % 27.2 %      Monocyte % 9.8 %      Eosinophil % 1.4 %      Basophil % 0.7 %      Immature Grans % 0.3 %      Neutrophils, Absolute 3.58 10*3/mm3      Lymphocytes, Absolute 1.61 10*3/mm3      Monocytes, Absolute 0.58 10*3/mm3      Eosinophils, Absolute 0.08 10*3/mm3      Basophils, Absolute 0.04 10*3/mm3      Immature Grans, Absolute 0.02 10*3/mm3      nRBC 0.0 /100 WBC             Imaging Results (Last 24 Hours)       Procedure Component Value Units Date/Time    CT Angiogram Chest [[id number]] Collected: 07/08/25 2129     Updated: 07/08/25 2144    Narrative:      CT ANGIOGRAM CHEST, CT ABDOMEN PELVIS W CONTRAST    Date of Exam: 7/8/2025 9:00 PM EDT    Indication: unstable angina.    Comparison: CXR 7/8/2025    Technique: CTA of the chest was performed before and after the uneventful intravenous administration of iodinated contrast. Reconstructed coronal and sagittal images were also obtained. In addition, a 3-D volume rendered image was created for   interpretation. Automated exposure control and iterative reconstruction methods were used.      Findings:  The pulmonary arteries are well opacified. No filling defects are seen. There are no findings of PE.    Subsegmental atelectasis and/are linear fibrosis is seen at the lung bases.    No abnormality is seen at the thoracic inlet. Mediastinal windows reveal no mediastinal, hilar, or axillary adenopathy. Moderate coronary artery calcifications are evident. A small hiatal hernia is seen.    The liver is of normal size. The liver is of diffusely diminished density consistent with mild fatty infiltration. Scattered cysts are evident, the  largest measure 1.4 cm. The gallbladder is not abnormally distended. No pancreatic or adrenal mass is   evident. The spleen is of normal size    Excreted contrast is seen in the renal collecting systems and ureters. There is no evidence of hydronephrosis. The urinary bladder is not abnormally distended. The prostate gland measures 7.2 cm in greatest transverse dimension. Metallic densities are   consistent with radiation therapy.    The stomach is not abnormally distended. Small bowel loops are of normal caliber. The appendix is normal. Mild diverticulosis of the descending colon and sigmoid colon is evident.    Small umbilical hernia and bilateral inguinal hernias containing fat are evident.    Degenerative spurring is seen in the spine.      Impression:      Impression:  CT scan of the chest with IV contrast demonstrating no findings of PE.    Coronary artery calcifications.    CT scan of the abdomen and pelvis demonstrating fatty liver.    Enlarged prostate gland with metallic densities consistent with radiation therapy.    Mild diverticulosis of the descending colon and sigmoid colon without diverticulitis.        Electronically Signed: Se Bowles MD    7/8/2025 9:42 PM EDT    Workstation ID: [id number]    CT Abdomen Pelvis With Contrast [[id number]] Collected: 07/08/25 2129     Updated: 07/08/25 2144    Narrative:      CT ANGIOGRAM CHEST, CT ABDOMEN PELVIS W CONTRAST    Date of Exam: 7/8/2025 9:00 PM EDT    Indication: unstable angina.    Comparison: CXR 7/8/2025    Technique: CTA of the chest was performed before and after the uneventful intravenous administration of iodinated contrast. Reconstructed coronal and sagittal images were also obtained. In addition, a 3-D volume rendered image was created for   interpretation. Automated exposure control and iterative reconstruction methods were used.      Findings:  The pulmonary arteries are well opacified. No filling defects are seen. There are no findings of  PE.    Subsegmental atelectasis and/are linear fibrosis is seen at the lung bases.    No abnormality is seen at the thoracic inlet. Mediastinal windows reveal no mediastinal, hilar, or axillary adenopathy. Moderate coronary artery calcifications are evident. A small hiatal hernia is seen.    The liver is of normal size. The liver is of diffusely diminished density consistent with mild fatty infiltration. Scattered cysts are evident, the largest measure 1.4 cm. The gallbladder is not abnormally distended. No pancreatic or adrenal mass is   evident. The spleen is of normal size    Excreted contrast is seen in the renal collecting systems and ureters. There is no evidence of hydronephrosis. The urinary bladder is not abnormally distended. The prostate gland measures 7.2 cm in greatest transverse dimension. Metallic densities are   consistent with radiation therapy.    The stomach is not abnormally distended. Small bowel loops are of normal caliber. The appendix is normal. Mild diverticulosis of the descending colon and sigmoid colon is evident.    Small umbilical hernia and bilateral inguinal hernias containing fat are evident.    Degenerative spurring is seen in the spine.      Impression:      Impression:  CT scan of the chest with IV contrast demonstrating no findings of PE.    Coronary artery calcifications.    CT scan of the abdomen and pelvis demonstrating fatty liver.    Enlarged prostate gland with metallic densities consistent with radiation therapy.    Mild diverticulosis of the descending colon and sigmoid colon without diverticulitis.        Electronically Signed: Se Bowles MD    7/8/2025 9:42 PM EDT    Workstation ID: [id number]    XR Chest 1 View [[id number]] Collected: 07/08/25 1427     Updated: 07/08/25 1430    Narrative:      XR CHEST 1 VW    Date of Exam: 7/8/2025 2:14 PM EDT    Indication: Chest pain    Comparison: 2/17/2025    Findings:  The lungs are clear bilaterally. Pleural spaces are normal.  Cardiac and mediastinal contours are within normal limits. Regional skeleton is within normal limits for age.      Impression:      Impression:  No acute cardiopulmonary disease.        Electronically Signed: Charbel Harvey MD    7/8/2025 2:28 PM EDT    Workstation ID: [id number]            LAB RESULTS (LAST 7 DAYS)    CBC  Results from last 7 days   Lab Units 07/09/25  0443 07/08/25  1413   WBC 10*3/mm3 13.63* 5.91   RBC 10*6/mm3 4.59 4.54   HEMOGLOBIN g/dL 15.0 14.7   HEMATOCRIT % 43.4 42.9   MCV fL 94.6 94.5   PLATELETS 10*3/mm3 230 243       BMP  Results from last 7 days   Lab Units 07/09/25  0443 07/08/25  1413   SODIUM mmol/L 137 140   POTASSIUM mmol/L 3.8 4.0   CHLORIDE mmol/L 102 103   CO2 mmol/L 22.9 26.5   BUN mg/dL 11.8 14.2   CREATININE mg/dL 0.90 0.88   GLUCOSE mg/dL 118* 95   MAGNESIUM mg/dL 1.9  --    PHOSPHORUS mg/dL 3.6  --        CMP   Results from last 7 days   Lab Units 07/09/25  0443 07/08/25  1413   SODIUM mmol/L 137 140   POTASSIUM mmol/L 3.8 4.0   CHLORIDE mmol/L 102 103   CO2 mmol/L 22.9 26.5   BUN mg/dL 11.8 14.2   CREATININE mg/dL 0.90 0.88   GLUCOSE mg/dL 118* 95       BNP        TROPONIN  Results from last 7 days   Lab Units 07/08/25  1521   HSTROP T ng/L 8       CoAg        Creatinine Clearance  Estimated Creatinine Clearance: 77.1 mL/min (by C-G formula based on SCr of 0.9 mg/dL).    ABG          Radiology  CT Angiogram Chest  Result Date: 7/8/2025  Impression: CT scan of the chest with IV contrast demonstrating no findings of PE. Coronary artery calcifications. CT scan of the abdomen and pelvis demonstrating fatty liver. Enlarged prostate gland with metallic densities consistent with radiation therapy. Mild diverticulosis of the descending colon and sigmoid colon without diverticulitis. Electronically Signed: Se Bowles MD  7/8/2025 9:42 PM EDT  Workstation ID: [id number]    CT Abdomen Pelvis With Contrast  Result Date: 7/8/2025  Impression: CT scan of the chest with IV contrast  demonstrating no findings of PE. Coronary artery calcifications. CT scan of the abdomen and pelvis demonstrating fatty liver. Enlarged prostate gland with metallic densities consistent with radiation therapy. Mild diverticulosis of the descending colon and sigmoid colon without diverticulitis. Electronically Signed: Se Bowles MD  7/8/2025 9:42 PM EDT  Workstation ID: [id number]    XR Chest 1 View  Result Date: 7/8/2025  Impression: No acute cardiopulmonary disease. Electronically Signed: Charbel Harvey MD  7/8/2025 2:28 PM EDT  Workstation ID: [id number]        EKG  I personally viewed and interpreted the patient's EKG/Telemetry data:  Telemetry Scan   Final Result      ECG 12 Lead Chest Pain   Preliminary Result   HEART RATE=98  bpm   RR Pxmgykfw=315  ms   VA Alskvvhy=522  ms   P Horizontal Axis=47  deg   P Front Axis=53  deg   QRSD Yufpjhri=424  ms   QT Iwayescv=587  ms   ZAkC=445  ms   QRS Axis=-29  deg   T Wave Axis=-3  deg   - NORMAL ECG -   Sinus rhythm   Date and Time of Study:2025-07-09 05:58:09      Telemetry Scan   Final Result      Telemetry Scan   Final Result      ECG 12 Lead Chest Pain   Final Result   HEART RATE=62  bpm   RR Dngfepkc=686  ms   VA Gcmkazqp=604  ms   P Horizontal Axis=-56  deg   P Front Axis=46  deg   QRSD Yndwxfnt=370  ms   QT Yjidnmep=345  ms   KOiL=619  ms   QRS Axis=-46  deg   T Wave Axis=11  deg   - ABNORMAL ECG -   Sinus rhythm   Nonspecific  IVCD with LAD   Left ventricular hypertrophy   When compared with ECG of 08-Jul-2025 13:52:35,   No significant change   Electronically Signed By: Troy Benedict ([facility]) 2025-07-09 06:13:16   Date and Time of Study:2025-07-08 20:27:51      Telemetry Scan   Final Result      Telemetry Scan   Final Result      Telemetry Scan   Final Result      ECG 12 Lead Chest Pain   Final Result   HEART RATE=57  bpm   RR Cvhdmcrs=2104  ms   VA Ojwrhtny=562  ms   P Horizontal Axis=12  deg   P Front Axis=46  deg   QRSD Xwagjcby=539  ms   QT Rlilkepf=839  ms    SWxG=483  ms   QRS Axis=-36  deg   T Wave Axis=32  deg   - ABNORMAL ECG -   Sinus bradycardia   Ventricular premature complex   Nonspecific  IVCD with LAD   No previous ECG available for comparison   Electronically Signed By: Yvan Triana ([facility]) 2025-07-09 06:28:54   Date and Time of Study:2025-07-08 13:52:35            Echocardiogram:    Results for orders placed during the hospital encounter of 11/13/19    Adult Transthoracic Echo Complete W/ Cont if Necessary Per Protocol 11/15/2019 0956    Interpretation Summary  · Left ventricular systolic function is normal. Calculated EF = 69.0%. Estimated EF was in agreement with the calculated EF. Normal left ventricular cavity size noted. Left ventricular wall thickness is consistent with mild concentric hypertrophy. Left ventricular diastolic function is normal.  · The valve appears trileaflet. The aortic valve is abnormal in structure. There is mild calcification of the aortic valve mainly affecting the non, left and right coronary cusp(s).Trace-to-mild aortic valve regurgitation is present. No aortic valve stenosis is present.  · The mitral valve is grossly normal in structure. Mild mitral valve regurgitation is present.  · The tricuspid valve is grossly normal. Mild tricuspid valve regurgitation is present. Estimated right ventricular systolic pressure from tricuspid regurgitation is normal (<35 mmHg).          Other:    ASCVD Risk Score::  The 10-year ASCVD risk score (Jhon MCKEON, et al., 2019) is: 21%    Values used to calculate the score:      Age: 75 years      Sex: Male      Is Non- : No      Diabetic: No      Tobacco smoker: No      Systolic Blood Pressure: 118 mmHg      Is BP treated: Yes      HDL Cholesterol: 47 mg/dL      Total Cholesterol: 134 mg/dL          ASSESSMENT & PLAN:    Principal Problem:    Unstable angina    Unstable angina  AAA without rupture  Hypertension  Hyperlipidemia  OAB/BPH  leg cramps   MIREYA    Plan  Blood pressure  stable on losartan and amlodipine, patient held these this morning  Off nitroglycerin  Echo pending rule out pericardial effusion  Stress test pending for ischemic evaluation, chest pain somewhat atypical continuous in nature without EKG changes or cardiac biomarker elevation unlikely for ACS or ischemic etiology with ongoing discomfort  If unremarkable would consult GI for upper endoscopy and evaluation  Electrolytes stable today- monitor and replace when needed  Continue HLD medications when able, monitor leg cramps  AAA stable- recommended surveillance       Further recommendations to follow findings and clinical course  Brandon Barrios MD, PhD

## 2025-07-09 NOTE — NURSING NOTE
Transport on unit to transport patient by bed to CT. Patient remains alert and oriented, vitals remain WDL, and patient remains free of signs and or symptoms of acute distress. Pain continues to abdomen and right shoulder he rates it a 5-7 out of 10.

## 2025-07-09 NOTE — NURSING NOTE
MARIA DEL ROSARIO Burger arrived to bedside to assess patient. Patient reporting severe abdominal pain upon palpation of the abdomen. Stat CT ordered, without contrast. Wife at bedside as well and patient and wife are aware of new orders and current plans of care. All vitals remain wdl. No signs or symptoms of acute distress.

## 2025-07-09 NOTE — NURSING NOTE
Transport just arrived with 2101 back from CT scan. Will administer ordered medications and obtain a set of vitals.

## 2025-07-09 NOTE — CONSULTS
GI CONSULT  NOTE:    Referring Provider: Dr. Pimentel    Chief complaint: Epigastric pain, cardiac workup negative    Subjective .     History of present illness: Giovani Moseley is a 75-year-old male with history of AAA without rupture, hypertension, and fatty liver presented to [facility] ED on 7/8 with chest pressure.  Cardiac workup negative.  GI was consulted for epigastric pain.    Patient reports that he developed tightness and pressure in the epigastric area that radiated to his right shoulder, prompting evaluation in the ER.  Cardiac evaluation has been negative.  He denies having any similar pain like this in the past.  He did have vomiting while in the ER.  His pain is improved since admission and he is tolerating diet.  No previous abdominal surgeries.      Endo History:  12/2023 colonoscopy: SSA, HP, mild diverticulosis of the sigmoid.  Recall 5 years.  11/2018 colonoscopy: Mild diverticulosis and internal hemorrhoids  6/2015 colonoscopy: 6 polyps, no path report available.  Internal hemorrhoids and diverticulosis of the sigmoid colon.  6/2004 colonoscopy: 2 polyps, 1 available.  Internal hemorrhoids and diverticulosis of the sigmoid colon.    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 Stephens    COLONOSCOPY      EYE SURGERY Bilateral 10/3 and 10/15    PROSTATE SURGERY  Dr. Payne  ([place])    2019    TONSILLECTOMY         Social History:  Social History     Tobacco Use    Smoking status: Former     Current packs/day: 0.00     Average packs/day: 1 pack/day for 35.0 years (35.0 ttl pk-yrs)     Types: Cigarettes     Start date: 1965     Quit date: 2000     Years since quittin.5     Passive exposure: Past    Smokeless tobacco: Never    Tobacco comments:     Stopped roughly in beginning of . light to moderate smoker throughout my history   Vaping Use    Vaping status: Never Used   Substance Use Topics    Alcohol use: Yes     Alcohol/week: 1.0 standard drink of alcohol     Types: 1 Cans of beer per week     Comment: light drinker when socializing    Drug use: No       Family History:  Family History   Problem Relation Age of Onset    Cancer Mother         Breast/ Pancreatic/    Pancreatic cancer Mother     Vision loss Mother         Mascular    Cancer Father         Prostrate/    Alzheimer's disease Father     Cancer Brother         lung cancer (alive)    Kidney disease Brother        Medications:  Medications Prior to Admission   Medication Sig Dispense Refill Last Dose/Taking    Bempedoic Acid-Ezetimibe 180-10 MG tablet Take 1 tablet by mouth Daily. 90 tablet 3 2025    Coenzyme Q10 (CoQ10) 100 MG capsule Take 1 capsule by mouth Daily.   2025    doxepin (SINEquan) 10 MG capsule Take 1 capsule by mouth As Needed for Sleep. prn 90 capsule 3 2025    losartan (COZAAR) 25 MG tablet Take 1 tablet by mouth Daily. 90 tablet 3 2025    silodosin (RAPAFLO) 8 MG capsule capsule Take 1 capsule by mouth Daily.   2025    Vibegron (Gemtesa) 75 MG tablet Take 1 tablet by mouth Daily.   2025       Scheduled Meds:amLODIPine, 2.5 mg, Oral, Q24H  enoxaparin sodium, 1 mg/kg, Subcutaneous, BID  losartan, 25 mg, Oral, Q24H  oxybutynin XL, 10 mg, Oral, Daily  pantoprazole, 40 mg, Intravenous, Nightly  sodium chloride, 10 mL, Intravenous, Q12H  tamsulosin,  "0.4 mg, Oral, Nightly      Continuous Infusions:   PRN Meds:.  acetaminophen **OR** acetaminophen **OR** acetaminophen    senna-docusate sodium **AND** polyethylene glycol **AND** bisacodyl **AND** bisacodyl    Calcium Replacement - Follow Nurse / BPA Driven Protocol    Magnesium Standard Dose Replacement - Follow Nurse / BPA Driven Protocol    Morphine    nitroglycerin    ondansetron    Phosphorus Replacement - Follow Nurse / BPA Driven Protocol    Potassium Replacement - Follow Nurse / BPA Driven Protocol    [COMPLETED] Insert Peripheral IV **AND** sodium chloride    sodium chloride    ALLERGIES:  Patient has no known allergies.    ROS:  The following systems were reviewed and negative;   Constitution:  No fevers, chills, no unintentional weight loss  Skin: no rash, no jaundice  Eyes:  No blurry vision, no eye pain  HENT:  No change in hearing or smell  Resp:  No dyspnea or cough  CV:  + chest pain, no palpitations  :  No dysuria, hematuria  Musculoskeletal:  + leg cramps  Neuro:  No tremor, no numbness  Psych:  No depression or confusion    Objective     Vital Signs:   Vitals:    07/09/25 0005 07/09/25 0344 07/09/25 0753 07/09/25 1420   BP: 133/77 128/74 118/68 119/64   BP Location: Right arm Right arm Right arm Right arm   Patient Position: Lying Lying Lying Lying   Pulse: 102 106 85 77   Resp: 20 19 20 18   Temp: 97.8 °F (36.6 °C) 98.7 °F (37.1 °C) 98.4 °F (36.9 °C) 98.1 °F (36.7 °C)   TempSrc: Oral Oral Oral Oral   SpO2: 94% 95% 94% 95%   Weight:  90.1 kg (198 lb 10.2 oz) 90.1 kg (198 lb 10.2 oz)    Height:   170.2 cm (67\")        Physical Exam:       General Appearance:    Awake and alert, in no acute distress   Head:    Normocephalic, without obvious abnormality, atraumatic       Lungs:     Respirations regular, even and unlabored   Chest Wall:    No abnormalities observed   Abdomen:     Soft, mild tenderness to palpation right upper quadrant, small umbilical hernia, no rebound or guarding, non-distended "       Extremities:   Moves all extremities, no edema, no cyanosis       Skin:   No rash, no jaundice       Neurologic:   Cranial nerves 2 - 12 grossly intact       Results Review:   I reviewed the patient's labs and imaging.  Results from last 7 days   Lab Units 07/09/25  0443 07/08/25  1413   WBC 10*3/mm3 13.63* 5.91   HEMOGLOBIN g/dL 15.0 14.7   PLATELETS 10*3/mm3 230 243     Results from last 7 days   Lab Units 07/09/25  0443 07/08/25  1413   SODIUM mmol/L 137 140   POTASSIUM mmol/L 3.8 4.0   CHLORIDE mmol/L 102 103   CO2 mmol/L 22.9 26.5   BUN mg/dL 11.8 14.2   CREATININE mg/dL 0.90 0.88   GLUCOSE mg/dL 118* 95   MAGNESIUM mg/dL 1.9  --    PHOSPHORUS mg/dL 3.6  --      Estimated Creatinine Clearance: 77.1 mL/min (by C-G formula based on SCr of 0.9 mg/dL).  Lab Results   Component Value Date    HGBA1C 5.81 (H) 07/08/2025    HGBA1C 5.70 (H) 01/14/2025    HGBA1C 5.70 (H) 01/10/2024     Results from last 7 days   Lab Units 07/08/25  1521 07/08/25  1413   HSTROP T ng/L 8 9     Infection     UA      Microbiology Results (last 10 days)       ** No results found for the last 240 hours. **          Imaging Results (Last 72 Hours)       Procedure Component Value Units Date/Time    CT Angiogram Chest [[id number]] Collected: 07/08/25 2129     Updated: 07/08/25 2144    Narrative:      CT ANGIOGRAM CHEST, CT ABDOMEN PELVIS W CONTRAST    Date of Exam: 7/8/2025 9:00 PM EDT    Indication: unstable angina.    Comparison: CXR 7/8/2025    Technique: CTA of the chest was performed before and after the uneventful intravenous administration of iodinated contrast. Reconstructed coronal and sagittal images were also obtained. In addition, a 3-D volume rendered image was created for   interpretation. Automated exposure control and iterative reconstruction methods were used.      Findings:  The pulmonary arteries are well opacified. No filling defects are seen. There are no findings of PE.    Subsegmental atelectasis and/are linear  fibrosis is seen at the lung bases.    No abnormality is seen at the thoracic inlet. Mediastinal windows reveal no mediastinal, hilar, or axillary adenopathy. Moderate coronary artery calcifications are evident. A small hiatal hernia is seen.    The liver is of normal size. The liver is of diffusely diminished density consistent with mild fatty infiltration. Scattered cysts are evident, the largest measure 1.4 cm. The gallbladder is not abnormally distended. No pancreatic or adrenal mass is   evident. The spleen is of normal size    Excreted contrast is seen in the renal collecting systems and ureters. There is no evidence of hydronephrosis. The urinary bladder is not abnormally distended. The prostate gland measures 7.2 cm in greatest transverse dimension. Metallic densities are   consistent with radiation therapy.    The stomach is not abnormally distended. Small bowel loops are of normal caliber. The appendix is normal. Mild diverticulosis of the descending colon and sigmoid colon is evident.    Small umbilical hernia and bilateral inguinal hernias containing fat are evident.    Degenerative spurring is seen in the spine.      Impression:      Impression:  CT scan of the chest with IV contrast demonstrating no findings of PE.    Coronary artery calcifications.    CT scan of the abdomen and pelvis demonstrating fatty liver.    Enlarged prostate gland with metallic densities consistent with radiation therapy.    Mild diverticulosis of the descending colon and sigmoid colon without diverticulitis.        Electronically Signed: Se Bowles MD    7/8/2025 9:42 PM EDT    Workstation ID: [id number]    CT Abdomen Pelvis With Contrast [[id number]] Collected: 07/08/25 2129     Updated: 07/08/25 2144    Narrative:      CT ANGIOGRAM CHEST, CT ABDOMEN PELVIS W CONTRAST    Date of Exam: 7/8/2025 9:00 PM EDT    Indication: unstable angina.    Comparison: CXR 7/8/2025    Technique: CTA of the chest was performed before and after  the uneventful intravenous administration of iodinated contrast. Reconstructed coronal and sagittal images were also obtained. In addition, a 3-D volume rendered image was created for   interpretation. Automated exposure control and iterative reconstruction methods were used.      Findings:  The pulmonary arteries are well opacified. No filling defects are seen. There are no findings of PE.    Subsegmental atelectasis and/are linear fibrosis is seen at the lung bases.    No abnormality is seen at the thoracic inlet. Mediastinal windows reveal no mediastinal, hilar, or axillary adenopathy. Moderate coronary artery calcifications are evident. A small hiatal hernia is seen.    The liver is of normal size. The liver is of diffusely diminished density consistent with mild fatty infiltration. Scattered cysts are evident, the largest measure 1.4 cm. The gallbladder is not abnormally distended. No pancreatic or adrenal mass is   evident. The spleen is of normal size    Excreted contrast is seen in the renal collecting systems and ureters. There is no evidence of hydronephrosis. The urinary bladder is not abnormally distended. The prostate gland measures 7.2 cm in greatest transverse dimension. Metallic densities are   consistent with radiation therapy.    The stomach is not abnormally distended. Small bowel loops are of normal caliber. The appendix is normal. Mild diverticulosis of the descending colon and sigmoid colon is evident.    Small umbilical hernia and bilateral inguinal hernias containing fat are evident.    Degenerative spurring is seen in the spine.      Impression:      Impression:  CT scan of the chest with IV contrast demonstrating no findings of PE.    Coronary artery calcifications.    CT scan of the abdomen and pelvis demonstrating fatty liver.    Enlarged prostate gland with metallic densities consistent with radiation therapy.    Mild diverticulosis of the descending colon and sigmoid colon without  diverticulitis.        Electronically Signed: Se Bowles MD    7/8/2025 9:42 PM EDT    Workstation ID: [id number]    XR Chest 1 View [[id number]] Collected: 07/08/25 1427     Updated: 07/08/25 1430    Narrative:      XR CHEST 1 VW    Date of Exam: 7/8/2025 2:14 PM EDT    Indication: Chest pain    Comparison: 2/17/2025    Findings:  The lungs are clear bilaterally. Pleural spaces are normal. Cardiac and mediastinal contours are within normal limits. Regional skeleton is within normal limits for age.      Impression:      Impression:  No acute cardiopulmonary disease.        Electronically Signed: Charbel Harvey MD    7/8/2025 2:28 PM EDT    Workstation ID: [id number]          75-year-old male with history of AAA without rupture, hypertension, and fatty liver presented to [facility] ED on 7/8 with chest pressure.  Cardiac workup negative.  GI was consulted for epigastric pain.    ASSESSMENT:  Epigastric pain  Right shoulder pain  History of AAA without rupture  Leukocytosis    PLAN:  - Check RUQ ultrasound to evaluate gallbladder.  - Check LFTs, lipase, amylase.  - Will proceed with EGD in the morning for further evaluation of epigastric pain.  - N.p.o. after midnight.  - Consider HIDA scan if workup above is negative.  - Continue pantoprazole.       I discussed the patients findings and my recommendations with the patient.  Patient was seen with attending physician, addendum to follow.     We appreciate the referral    Electronically signed by Verito Mittal PA-C, 07/09/25, 3:54 PM EDT.

## 2025-07-09 NOTE — PLAN OF CARE
Goal Outcome Evaluation:  Plan of Care Reviewed With: patient        Progress: no change  Outcome Evaluation: Echo and stress test done today. Patient not complaining of any pain today. Will continue to monitor.

## 2025-07-10 ENCOUNTER — INPATIENT HOSPITAL (OUTPATIENT)
Dept: URBAN - METROPOLITAN AREA HOSPITAL 84 | Facility: HOSPITAL | Age: 75
End: 2025-07-10
Payer: MEDICARE

## 2025-07-10 ENCOUNTER — APPOINTMENT (OUTPATIENT)
Dept: MRI IMAGING | Facility: HOSPITAL | Age: 75
DRG: 418 | End: 2025-07-10
Payer: MEDICARE

## 2025-07-10 DIAGNOSIS — K82.8 OTHER SPECIFIED DISEASES OF GALLBLADDER: ICD-10-CM

## 2025-07-10 DIAGNOSIS — K85.10 BILIARY ACUTE PANCREATITIS WITHOUT NECROSIS OR INFECTION: ICD-10-CM

## 2025-07-10 LAB
ANION GAP SERPL CALCULATED.3IONS-SCNC: 13.1 MMOL/L (ref 5–15)
BUN SERPL-MCNC: 16.2 MG/DL (ref 8–23)
BUN/CREAT SERPL: 17.8 (ref 7–25)
CALCIUM SPEC-SCNC: 9.2 MG/DL (ref 8.6–10.5)
CHLORIDE SERPL-SCNC: 103 MMOL/L (ref 98–107)
CO2 SERPL-SCNC: 22.9 MMOL/L (ref 22–29)
CREAT SERPL-MCNC: 0.91 MG/DL (ref 0.76–1.27)
CRP SERPL-MCNC: 9.85 MG/DL (ref 0–0.5)
EGFRCR SERPLBLD CKD-EPI 2021: 88.4 ML/MIN/1.73
GLUCOSE SERPL-MCNC: 109 MG/DL (ref 65–99)
MAGNESIUM SERPL-MCNC: 2 MG/DL (ref 1.6–2.4)
PHOSPHATE SERPL-MCNC: 2.5 MG/DL (ref 2.5–4.5)
POTASSIUM SERPL-SCNC: 4.3 MMOL/L (ref 3.5–5.2)
SODIUM SERPL-SCNC: 139 MMOL/L (ref 136–145)

## 2025-07-10 PROCEDURE — 74183 MRI ABD W/O CNTR FLWD CNTR: CPT

## 2025-07-10 PROCEDURE — A9579 GAD-BASE MR CONTRAST NOS,1ML: HCPCS | Performed by: INTERNAL MEDICINE

## 2025-07-10 PROCEDURE — 99222 1ST HOSP IP/OBS MODERATE 55: CPT | Performed by: STUDENT IN AN ORGANIZED HEALTH CARE EDUCATION/TRAINING PROGRAM

## 2025-07-10 PROCEDURE — 25010000002 ENOXAPARIN PER 10 MG

## 2025-07-10 PROCEDURE — 99233 SBSQ HOSP IP/OBS HIGH 50: CPT | Performed by: PHYSICIAN ASSISTANT

## 2025-07-10 PROCEDURE — 25810000003 LACTATED RINGERS PER 1000 ML: Performed by: INTERNAL MEDICINE

## 2025-07-10 PROCEDURE — 25010000002 GADOTERIDOL PER 1 ML: Performed by: INTERNAL MEDICINE

## 2025-07-10 PROCEDURE — 99232 SBSQ HOSP IP/OBS MODERATE 35: CPT | Performed by: NURSE PRACTITIONER

## 2025-07-10 RX ORDER — SODIUM CHLORIDE, SODIUM LACTATE, POTASSIUM CHLORIDE, CALCIUM CHLORIDE 600; 310; 30; 20 MG/100ML; MG/100ML; MG/100ML; MG/100ML
150 INJECTION, SOLUTION INTRAVENOUS CONTINUOUS
Status: DISPENSED | OUTPATIENT
Start: 2025-07-10 | End: 2025-07-10

## 2025-07-10 RX ORDER — GADOTERIDOL 279.3 MG/ML
20 INJECTION INTRAVENOUS
Status: COMPLETED | OUTPATIENT
Start: 2025-07-10 | End: 2025-07-10

## 2025-07-10 RX ADMIN — TAMSULOSIN HYDROCHLORIDE 0.4 MG: 0.4 CAPSULE ORAL at 21:17

## 2025-07-10 RX ADMIN — GADOTERIDOL 19 ML: 279.3 INJECTION, SOLUTION INTRAVENOUS at 14:12

## 2025-07-10 RX ADMIN — Medication 10 ML: at 08:07

## 2025-07-10 RX ADMIN — ENOXAPARIN SODIUM 90 MG: 100 INJECTION SUBCUTANEOUS at 17:11

## 2025-07-10 RX ADMIN — SODIUM CHLORIDE, POTASSIUM CHLORIDE, SODIUM LACTATE AND CALCIUM CHLORIDE 150 ML/HR: 600; 310; 30; 20 INJECTION, SOLUTION INTRAVENOUS at 08:06

## 2025-07-10 RX ADMIN — DOXEPIN HYDROCHLORIDE 10 MG: 10 CAPSULE ORAL at 21:17

## 2025-07-10 RX ADMIN — Medication 10 ML: at 21:17

## 2025-07-10 RX ADMIN — PANTOPRAZOLE SODIUM 40 MG: 40 INJECTION, POWDER, FOR SOLUTION INTRAVENOUS at 21:17

## 2025-07-10 NOTE — PLAN OF CARE
Problem: Adult Inpatient Plan of Care  Goal: Plan of Care Review  Outcome: Progressing  Goal: Patient-Specific Goal (Individualized)  Outcome: Progressing  Goal: Absence of Hospital-Acquired Illness or Injury  Outcome: Progressing  Intervention: Identify and Manage Fall Risk  Recent Flowsheet Documentation  Taken 7/10/2025 0400 by Rosamaria May RN  Safety Promotion/Fall Prevention:   safety round/check completed   room organization consistent   nonskid shoes/slippers when out of bed   clutter free environment maintained   assistive device/personal items within reach  Taken 7/10/2025 0000 by Rosamaria May RN  Safety Promotion/Fall Prevention:   safety round/check completed   room organization consistent   clutter free environment maintained   assistive device/personal items within reach  Taken 7/9/2025 2000 by Rosamaria May RN  Safety Promotion/Fall Prevention:   safety round/check completed   room organization consistent   clutter free environment maintained   assistive device/personal items within reach  Intervention: Prevent Skin Injury  Recent Flowsheet Documentation  Taken 7/10/2025 0400 by Rosamaria May RN  Body Position: position changed independently  Skin Protection: transparent dressing maintained  Taken 7/10/2025 0000 by Rosamaria May RN  Body Position: position changed independently  Taken 7/9/2025 2000 by Rosamaria May RN  Body Position: position changed independently  Skin Protection: transparent dressing maintained  Intervention: Prevent and Manage VTE (Venous Thromboembolism) Risk  Recent Flowsheet Documentation  Taken 7/9/2025 2000 by Rosamaria May RN  VTE Prevention/Management: (lovenox) other (see comments)  Intervention: Prevent Infection  Recent Flowsheet Documentation  Taken 7/10/2025 0400 by Rosamaria May RN  Infection Prevention:   single patient room provided   rest/sleep promoted   personal protective equipment utilized   hand hygiene promoted   equipment surfaces  disinfected   environmental surveillance performed  Taken 7/10/2025 0000 by Rosamaria May RN  Infection Prevention:   single patient room provided   rest/sleep promoted   personal protective equipment utilized   hand hygiene promoted   equipment surfaces disinfected   environmental surveillance performed  Taken 7/9/2025 2000 by Rosamaria May RN  Infection Prevention:   single patient room provided   rest/sleep promoted   personal protective equipment utilized   hand hygiene promoted   equipment surfaces disinfected   environmental surveillance performed  Goal: Optimal Comfort and Wellbeing  Outcome: Progressing  Intervention: Provide Person-Centered Care  Recent Flowsheet Documentation  Taken 7/10/2025 0400 by Rosamaria May RN  Trust Relationship/Rapport:   care explained   choices provided   questions answered  Taken 7/10/2025 0000 by Rosamaria May RN  Trust Relationship/Rapport:   care explained   choices provided   questions answered  Taken 7/9/2025 2000 by Rosamaria May RN  Trust Relationship/Rapport:   care explained   choices provided   questions answered  Goal: Readiness for Transition of Care  Outcome: Progressing   Goal Outcome Evaluation:

## 2025-07-10 NOTE — PROGRESS NOTES
"Hospitalist Service   Daily Progress Note      Patient Name: Giovani Moseley  : 1950  MRN: [id number]  Primary Care Physician:  Yvonne Santa MD  Date of admission: 2025      Subjective        Patient seen and examined this morning.  Doing well this morning, symptoms have improved.  Ultrasound results discussed with him, MRCP ordered by GI and pending.    ROS: Pertinent positives as noted in HPI/subjective.  All other systems were reviewed and are negative.      Objective      Vitals:   Temp:  [97.6 °F (36.4 °C)-98.9 °F (37.2 °C)] 97.6 °F (36.4 °C)  Heart Rate:  [58-85] 79  Resp:  [16-20] 16  BP: (112-136)/(64-75) 112/75    Physical Exam:    General: Awake, alert, NAD  Eyes: PERRL, EOMI, conjunctivae are clear  Cardiovascular: Regular rate and rhythm, no murmurs  Respiratory: Clear to auscultation bilaterally, no wheezing or rales, unlabored breathing  Abdomen: Soft, nontender, positive bowel sounds, no guarding  Neurologic: A&O, CN grossly intact, moves all extremities spontaneously  Musculoskeletal: Normal range of motion, no other gross deformities  Skin: Warm, dry         Result Review    Result Review:  I have personally reviewed the results from the time of this admission to 7/10/2025 10:15 EDT and agree with these findings:  [x]  Laboratory  [x]  Microbiology  [x]  Radiology  []  EKG/Telemetry   []  Cardiology/Vascular   []  Pathology  []  Old records  []  Other:          Assessment & Plan      Brief Patient Summary:  Giovani Moseley is a 75 y.o. male with a CMH of AAA without rupture, hypertension, hyperlipidemia, OAB, BPH, leg cramps who presented to [facility] on 2025 with chest pressure.  Patient reported he has had symptoms all day, describes as \" a lot of discomfort, feels like pressure up here\" pointing to epigastric region.  Patient reported for the past couple of weeks he has been having leg cramping at nighttime, so he cut back on his dose of losartan and nexlizet to half dose, " and reported sometimes he skips doses.  Patient reported he vomited 3 times in the emergency department.  ACS ruled out, troponin negative.  Due to AAA history, CTA chest abdomen pelvis negative for any acute findings.  Cardiology consulted.  Stress test and echo ordered.      amLODIPine, 2.5 mg, Oral, Q24H  doxepin, 10 mg, Oral, Nightly  enoxaparin sodium, 1 mg/kg, Subcutaneous, BID  losartan, 25 mg, Oral, Q24H  oxybutynin XL, 10 mg, Oral, Daily  pantoprazole, 40 mg, Intravenous, Nightly  sodium chloride, 10 mL, Intravenous, Q12H  tamsulosin, 0.4 mg, Oral, Nightly       lactated ringers, 150 mL/hr, Last Rate: 150 mL/hr (07/10/25 0806)         I have utilized all available, immediate resources to obtain, update, or review the patient's current medications including all prescriptions, over-the-counter products, herbals, cannabis/cannabidiol products, and vitamin.mineral/dietary (nutritional) supplements.    Active Hospital Problems:  Active Hospital Problems    Diagnosis     **Unstable angina     Epigastric abdominal pain        Assessment/Plan:     Substernal chest pain/pressure, improved  -ACS ruled out, troponin negative  -CTA chest abdomen pelvis negative for any acute findings  -Stress test negative, echo with preserved EF noted  -Continue aspirin, statin  -Cardiology following    Epigastric pain  -Right upper quadrant ultrasound shows gallbladder stones and sludge but no acute cholecystitis or obstruction  -GI following and plan for MRCP today    Hypertension  Hyperlipidemia  BPH  -Continue home meds as tolerated, monitor    VTE Prophylaxis:  Pharmacologic VTE prophylaxis orders are present.        CODE STATUS:    Code Status (Patient has no pulse and is not breathing): CPR (Attempt to Resuscitate)  Medical Interventions (Patient has pulse or is breathing): Full Support  Level Of Support Discussed With: Patient      Disposition Planning:     Barriers to Discharge: GI testing  Anticipated Date of Discharge:  7/11  Place of Discharge: Home    Electronically signed by Salvador Pimentel DO, 07/10/25, 10:15 EDT.  [facility] Hospitalist Team      Part of this note may be an electronic transcription/translation of spoken language to printed text using the Dragon Dictation System.

## 2025-07-10 NOTE — PROGRESS NOTES
LOS: 2 days   Patient Care Team:  Yvonne Santa MD as PCP - General (Family Medicine)  Patrick Crane MD as Consulting Physician (Urology)  Seipel, Joseph F, MD as Consulting Physician (Sleep Medicine)  Winnie Farley OD (Optometry)  Brandon Barrios MD as Consulting Physician (Cardiology)  Jacqui David MD as Consulting Physician (Dermatology)  Hollis Junior MD as Consulting Physician (Neurosurgery)  Michael Munoz MD as Consulting Physician (Urology)  Tl Huang RN as Ambulatory  ([facility])      Subjective     Interval History:     Subjective: Patient denies abdominal pain but is tender to palpation in the epigastric area.  No nausea or vomiting.  Planning for MRI today for further evaluation of the pancreas.      ROS:   No chest pain, shortness of breath, or cough.        Medication Review:     Current Facility-Administered Medications:     acetaminophen (TYLENOL) tablet 650 mg, 650 mg, Oral, Q4H PRN **OR** acetaminophen (TYLENOL) 160 MG/5ML oral solution 650 mg, 650 mg, Oral, Q4H PRN **OR** acetaminophen (TYLENOL) suppository 650 mg, 650 mg, Rectal, Q4H PRN, Jennyfer Gomez, APRN    amLODIPine (NORVASC) tablet 2.5 mg, 2.5 mg, Oral, Q24H, Brandon Barrios MD, 2.5 mg at 07/08/25 1947    sennosides-docusate (PERICOLACE) 8.6-50 MG per tablet 2 tablet, 2 tablet, Oral, BID PRN **AND** polyethylene glycol (MIRALAX) packet 17 g, 17 g, Oral, Daily PRN **AND** bisacodyl (DULCOLAX) EC tablet 5 mg, 5 mg, Oral, Daily PRN **AND** bisacodyl (DULCOLAX) suppository 10 mg, 10 mg, Rectal, Daily PRN, Jennyfer Gomez, APRN    Calcium Replacement - Follow Nurse / BPA Driven Protocol, , Not Applicable, PRN, Jennyfer Gomez, APRN    doxepin (SINEquan) capsule 10 mg, 10 mg, Oral, Nightly, Pimentel, Jalaram, DO, 10 mg at 07/09/25 0859    enoxaparin sodium (LOVENOX) syringe 90 mg, 1 mg/kg, Subcutaneous, BID, Jennyfer Gomez APRN, 90 mg at 07/09/25 9594     lactated ringers infusion, 150 mL/hr, Intravenous, Continuous, Brandon Dumont MD, Last Rate: 150 mL/hr at 07/10/25 0806, 150 mL/hr at 07/10/25 0806    losartan (COZAAR) tablet 25 mg, 25 mg, Oral, Q24H, Brandon Barrios MD    Magnesium Standard Dose Replacement - Follow Nurse / BPA Driven Protocol, , Not Applicable, PRN, Jennyfer Gomez APRN    nitroglycerin (NITROSTAT) SL tablet 0.4 mg, 0.4 mg, Sublingual, Q5 Min PRN, Jennyfer Gomez APRN    ondansetron (ZOFRAN) injection 4 mg, 4 mg, Intravenous, Q6H PRN, Jennyfer Gomez APRN    oxybutynin XL (DITROPAN-XL) 24 hr tablet 10 mg, 10 mg, Oral, Daily, Pimentel, Jalaram, DO    pantoprazole (PROTONIX) injection 40 mg, 40 mg, Intravenous, Nightly, Jennyfer Gomez APRN, 40 mg at 07/09/25 2108    Phosphorus Replacement - Follow Nurse / BPA Driven Protocol, , Not Applicable, PRN, Jennyfer Gomez APRN    Potassium Replacement - Follow Nurse / BPA Driven Protocol, , Not Applicable, PRN, Jennyfer Gomez APRN    [COMPLETED] Insert Peripheral IV, , , Once **AND** sodium chloride 0.9 % flush 10 mL, 10 mL, Intravenous, PRN, Yvan Triana MD    sodium chloride 0.9 % flush 10 mL, 10 mL, Intravenous, Q12H, Jennyfer Gomez APRN, 10 mL at 07/10/25 0807    sodium chloride 0.9 % infusion 40 mL, 40 mL, Intravenous, PRN, Jennyfer Gomez APRN    tamsulosin (FLOMAX) 24 hr capsule 0.4 mg, 0.4 mg, Oral, Nightly, Pimentel, Jalaram, DO, 0.4 mg at 07/09/25 2306      Objective     Vital Signs  Vitals:    07/09/25 2108 07/09/25 2334 07/10/25 0427 07/10/25 0759   BP: 136/73 125/74 126/75 112/75   BP Location:  Right arm Right arm Right arm   Patient Position:  Lying Lying Lying   Pulse: 77 58 82 79   Resp:  20 16 16   Temp:  98.3 °F (36.8 °C) 98.9 °F (37.2 °C) 97.6 °F (36.4 °C)   TempSrc:  Oral Oral Oral   SpO2:  95%     Weight:   91.9 kg (202 lb 9.6 oz)    Height:           Physical Exam:     General Appearance:    Awake and alert, in no acute distress    Head:    Normocephalic, without obvious abnormality   Eyes:          Anicteric sclera       Neck:   No JVD   Lungs:     Respirations regular, even and unlabored   Abdomen:     Soft, epigastric tenderness, no rebound or guarding, non-distended       Extremities:   No edema, no cyanosis   Skin:   No bruising or rash, no jaundice        Results Review:    Results from last 7 days   Lab Units 07/09/25  2326 07/09/25  0443 07/08/25  1413   WBC 10*3/mm3 9.90 13.63* 5.91   HEMOGLOBIN g/dL 15.1 15.0 14.7   PLATELETS 10*3/mm3 234 230 243     Results from last 7 days   Lab Units 07/09/25  2326 07/09/25  0443 07/08/25  1413   SODIUM mmol/L 139 139  137 140   POTASSIUM mmol/L 4.3 4.1  3.8 4.0   CHLORIDE mmol/L 103 103  102 103   CO2 mmol/L 22.9 20.7*  22.9 26.5   BUN mg/dL 16.2 11.7  11.8 14.2   CREATININE mg/dL 0.91 0.89  0.90 0.88   GLUCOSE mg/dL 109* 116*  118* 95   ALBUMIN g/dL  --  4.4  --    BILIRUBIN mg/dL  --  1.7*  --    ALK PHOS U/L  --  34*  --    AST (SGOT) U/L  --  31  --    ALT (SGPT) U/L  --  31  --    MAGNESIUM mg/dL 2.0 1.9  --    PHOSPHORUS mg/dL 2.5 3.6  --    AMYLASE U/L  --  302*  --    LIPASE U/L  --  816*  --    CRP mg/dL 9.85*  --   --      Estimated Creatinine Clearance: 77 mL/min (by C-G formula based on SCr of 0.91 mg/dL).  Lab Results   Component Value Date    HGBA1C 5.81 (H) 07/08/2025    HGBA1C 5.70 (H) 01/14/2025    HGBA1C 5.70 (H) 01/10/2024     Results from last 7 days   Lab Units 07/08/25  1521 07/08/25  1413   HSTROP T ng/L 8 9     Infection     UA      Microbiology Results (last 10 days)       ** No results found for the last 240 hours. **          Imaging Results (Last 72 Hours)       Procedure Component Value Units Date/Time    US Abdomen Limited [[id number]] Collected: 07/09/25 2332     Updated: 07/09/25 2337    Narrative:      US ABDOMEN LIMITED    INDICATION:  Epigastric pain.    COMPARISON:  CT abdomen pelvis 7/8/2025    FINDINGS:  PANCREAS: The pancreas is obscured due to  overlying bowel gas.    LIVER: The echogenicity and echotexture of the hepatic parenchyma is within normal limits.  No hepatic mass. There is a cyst in the left hepatic lobe measuring up to 1.8 cm. The intrahepatic bile ducts are normal in caliber. The common duct is normal in   size at the rory hepatis measuring 3-4 mm.    GALLBLADDER: Distended. Stones and sludge are noted. No gallbladder wall thickening. Negative sonographic Ames's sign.    RIGHT KIDNEY: The right kidney measures 11.9 cm. Renal cortical thickness and echogenicity are normal. No hydronephrosis.    OTHER: Trace free fluid adjacent to the liver. Main portal vein is patent with a normal hepatopetal direction of flow.      Impression:      1.Gallbladder is distended with stones and sludge. No gallbladder wall thickening. Negative sonographic Ames's sign. No biliary obstruction.  2.Trace free fluid adjacent to the liver.        Electronically Signed: Giovani Abel MD    7/9/2025 11:35 PM EDT    Workstation ID: [id number]    CT Angiogram Chest [[id number]] Collected: 07/08/25 2129     Updated: 07/08/25 2144    Narrative:      CT ANGIOGRAM CHEST, CT ABDOMEN PELVIS W CONTRAST    Date of Exam: 7/8/2025 9:00 PM EDT    Indication: unstable angina.    Comparison: CXR 7/8/2025    Technique: CTA of the chest was performed before and after the uneventful intravenous administration of iodinated contrast. Reconstructed coronal and sagittal images were also obtained. In addition, a 3-D volume rendered image was created for   interpretation. Automated exposure control and iterative reconstruction methods were used.      Findings:  The pulmonary arteries are well opacified. No filling defects are seen. There are no findings of PE.    Subsegmental atelectasis and/are linear fibrosis is seen at the lung bases.    No abnormality is seen at the thoracic inlet. Mediastinal windows reveal no mediastinal, hilar, or axillary adenopathy. Moderate coronary artery  calcifications are evident. A small hiatal hernia is seen.    The liver is of normal size. The liver is of diffusely diminished density consistent with mild fatty infiltration. Scattered cysts are evident, the largest measure 1.4 cm. The gallbladder is not abnormally distended. No pancreatic or adrenal mass is   evident. The spleen is of normal size    Excreted contrast is seen in the renal collecting systems and ureters. There is no evidence of hydronephrosis. The urinary bladder is not abnormally distended. The prostate gland measures 7.2 cm in greatest transverse dimension. Metallic densities are   consistent with radiation therapy.    The stomach is not abnormally distended. Small bowel loops are of normal caliber. The appendix is normal. Mild diverticulosis of the descending colon and sigmoid colon is evident.    Small umbilical hernia and bilateral inguinal hernias containing fat are evident.    Degenerative spurring is seen in the spine.      Impression:      Impression:  CT scan of the chest with IV contrast demonstrating no findings of PE.    Coronary artery calcifications.    CT scan of the abdomen and pelvis demonstrating fatty liver.    Enlarged prostate gland with metallic densities consistent with radiation therapy.    Mild diverticulosis of the descending colon and sigmoid colon without diverticulitis.        Electronically Signed: Se Bowles MD    7/8/2025 9:42 PM EDT    Workstation ID: [id number]    CT Abdomen Pelvis With Contrast [[id number]] Collected: 07/08/25 2129     Updated: 07/08/25 2144    Narrative:      CT ANGIOGRAM CHEST, CT ABDOMEN PELVIS W CONTRAST    Date of Exam: 7/8/2025 9:00 PM EDT    Indication: unstable angina.    Comparison: CXR 7/8/2025    Technique: CTA of the chest was performed before and after the uneventful intravenous administration of iodinated contrast. Reconstructed coronal and sagittal images were also obtained. In addition, a 3-D volume rendered image was created for    interpretation. Automated exposure control and iterative reconstruction methods were used.      Findings:  The pulmonary arteries are well opacified. No filling defects are seen. There are no findings of PE.    Subsegmental atelectasis and/are linear fibrosis is seen at the lung bases.    No abnormality is seen at the thoracic inlet. Mediastinal windows reveal no mediastinal, hilar, or axillary adenopathy. Moderate coronary artery calcifications are evident. A small hiatal hernia is seen.    The liver is of normal size. The liver is of diffusely diminished density consistent with mild fatty infiltration. Scattered cysts are evident, the largest measure 1.4 cm. The gallbladder is not abnormally distended. No pancreatic or adrenal mass is   evident. The spleen is of normal size    Excreted contrast is seen in the renal collecting systems and ureters. There is no evidence of hydronephrosis. The urinary bladder is not abnormally distended. The prostate gland measures 7.2 cm in greatest transverse dimension. Metallic densities are   consistent with radiation therapy.    The stomach is not abnormally distended. Small bowel loops are of normal caliber. The appendix is normal. Mild diverticulosis of the descending colon and sigmoid colon is evident.    Small umbilical hernia and bilateral inguinal hernias containing fat are evident.    Degenerative spurring is seen in the spine.      Impression:      Impression:  CT scan of the chest with IV contrast demonstrating no findings of PE.    Coronary artery calcifications.    CT scan of the abdomen and pelvis demonstrating fatty liver.    Enlarged prostate gland with metallic densities consistent with radiation therapy.    Mild diverticulosis of the descending colon and sigmoid colon without diverticulitis.        Electronically Signed: Se Bowles MD    7/8/2025 9:42 PM EDT    Workstation ID: [id number]    XR Chest 1 View [[id number]] Collected: 07/08/25 3166     Updated:  07/08/25 1430    Narrative:      XR CHEST 1 VW    Date of Exam: 7/8/2025 2:14 PM EDT    Indication: Chest pain    Comparison: 2/17/2025    Findings:  The lungs are clear bilaterally. Pleural spaces are normal. Cardiac and mediastinal contours are within normal limits. Regional skeleton is within normal limits for age.      Impression:      Impression:  No acute cardiopulmonary disease.        Electronically Signed: Charbel Harvye MD    7/8/2025 2:28 PM EDT    Workstation ID: [id number]            Assessment & Plan     75-year-old male with history of AAA without rupture, hypertension, and fatty liver presented to [facility] ED on 7/8 with chest pressure.  Cardiac workup negative.  GI was consulted for epigastric pain.     ASSESSMENT:  Gallstone pancreatitis  Epigastric pain  Right shoulder pain  History of AAA without rupture  Leukocytosis     PLAN:  - RUQ ultrasound 7/9: Distended gallbladder with stones and sludge.  - Total bilirubin 1.7.  AST and ALT normal.  - Lipase 816, amylase 302.  - WBC 9.9 from 13.63.  - Lipid panel normal.  - Proceed with MRI abdomen to further evaluate pancreas and bile ducts.  N.p.o. pending results.  - If choledocholithiasis on imaging, proceed with ERCP.  - General Surgery consult.  - Check CRP.  - Cancel EGD due to findings above.  - Continue pantoprazole.    Electronically signed by Verito Mittal PA-C, 07/10/25, 12:48 PM EDT.

## 2025-07-10 NOTE — PROGRESS NOTES
CARDIOLOGY FOLLOW-UP PROGRESS NOTE      Reason for follow-up:    Unstable angina     Attending: Salvador Pimentel DO Subjective .     Patient is laying in bed today with some abdominal and epigastric pain. Cardiac workup was negative and is now being seen by GI and general surgery     Review of Systems   Gastrointestinal:  Positive for abdominal pain.     14 point system reviewed and negative except what is noted and in HPI    Allergies: Patient has no known allergies.    Scheduled Meds:amLODIPine, 2.5 mg, Oral, Q24H  doxepin, 10 mg, Oral, Nightly  enoxaparin sodium, 1 mg/kg, Subcutaneous, BID  losartan, 25 mg, Oral, Q24H  oxybutynin XL, 10 mg, Oral, Daily  pantoprazole, 40 mg, Intravenous, Nightly  sodium chloride, 10 mL, Intravenous, Q12H  tamsulosin, 0.4 mg, Oral, Nightly        Continuous Infusions:lactated ringers, 150 mL/hr, Last Rate: 150 mL/hr (07/10/25 0806)        PRN Meds:.  acetaminophen **OR** acetaminophen **OR** acetaminophen    senna-docusate sodium **AND** polyethylene glycol **AND** bisacodyl **AND** bisacodyl    Calcium Replacement - Follow Nurse / BPA Driven Protocol    Magnesium Standard Dose Replacement - Follow Nurse / BPA Driven Protocol    nitroglycerin    ondansetron    Phosphorus Replacement - Follow Nurse / BPA Driven Protocol    Potassium Replacement - Follow Nurse / BPA Driven Protocol    [COMPLETED] Insert Peripheral IV **AND** sodium chloride    sodium chloride    Objective     VITAL SIGNS  Patient Vitals for the past 24 hrs:   BP Temp Temp src Pulse Resp SpO2 Weight   07/10/25 0759 112/75 97.6 °F (36.4 °C) Oral 79 16 -- --   07/10/25 0427 126/75 98.9 °F (37.2 °C) Oral 82 16 -- 91.9 kg (202 lb 9.6 oz)   07/09/25 2334 125/74 98.3 °F (36.8 °C) Oral 58 20 95 % --   07/09/25 2108 136/73 -- -- 77 -- -- --   07/09/25 2009 136/73 98.5 °F (36.9 °C) Oral 70 16 96 % --   07/09/25 1609 117/71 98.2 °F (36.8 °C) Oral 85 17 95 % --   07/09/25 1420 119/64 98.1 °F (36.7 °C) Oral 77 18 95 % --     "    Flowsheet Rows      Flowsheet Row First Filed Value   Admission Height 170.2 cm (67\") Documented at 07/08/2025 1350   Admission Weight 89.4 kg (197 lb) Documented at 07/08/2025 1350            Body mass index is 31.73 kg/m².      Intake/Output Summary (Last 24 hours) at 7/10/2025 1139  Last data filed at 7/10/2025 0427  Gross per 24 hour   Intake --   Output 425 ml   Net -425 ml        TELEMETRY:     Sinus rhythm    Physical Exam:  Physical Exam     Constitutional:       General: He is not in acute distress.     Appearance: Normal appearance.   HENT:      Head: Normocephalic and atraumatic.   Eyes:      Extraocular Movements: Extraocular movements intact.      Pupils: Pupils are equal, round, and reactive to light.   Cardiovascular:      Rate and Rhythm: Normal rate and regular rhythm.   Pulmonary:      Effort: Pulmonary effort is normal.      Breath sounds: Normal breath sounds.   Abdominal:      General: Abdomen slightly distended. Bowel sounds are normal.      Palpations: Abdomen is soft.   Musculoskeletal:      Cervical back: Normal range of motion.   Skin:     General: Skin is warm and dry.   Neurological:      General: No focal deficit present.      Mental Status: He is alert and oriented to person, place, and time. Mental status is at baseline.   Psychiatric:         Mood and Affect: Mood normal.         Behavior: Behavior normal.         Thought Content: Thought content normal.     Results Review:   I reviewed the patient's new clinical results.    CBC    Results from last 7 days   Lab Units 07/09/25  2326 07/09/25  0443 07/08/25  1413   WBC 10*3/mm3 9.90 13.63* 5.91   HEMOGLOBIN g/dL 15.1 15.0 14.7   PLATELETS 10*3/mm3 234 230 243     BMP   Results from last 7 days   Lab Units 07/09/25  2326 07/09/25  0443 07/08/25  1413   SODIUM mmol/L 139 139  137 140   POTASSIUM mmol/L 4.3 4.1  3.8 4.0   CHLORIDE mmol/L 103 103  102 103   CO2 mmol/L 22.9 20.7*  22.9 26.5   BUN mg/dL 16.2 11.7  11.8 14.2 " "  CREATININE mg/dL 0.91 0.89  0.90 0.88   GLUCOSE mg/dL 109* 116*  118* 95   MAGNESIUM mg/dL 2.0 1.9  --    PHOSPHORUS mg/dL 2.5 3.6  --      Cr Clearance Estimated Creatinine Clearance: 77 mL/min (by C-G formula based on SCr of 0.91 mg/dL).  Coag     HbA1C   Lab Results   Component Value Date    HGBA1C 5.81 (H) 07/08/2025    HGBA1C 5.70 (H) 01/14/2025    HGBA1C 5.70 (H) 01/10/2024     Blood Glucose No results found for: \"POCGLU\"  Infection     CMP   Results from last 7 days   Lab Units 07/09/25 2326 07/09/25 0443 07/08/25  1413   SODIUM mmol/L 139 139  137 140   POTASSIUM mmol/L 4.3 4.1  3.8 4.0   CHLORIDE mmol/L 103 103  102 103   CO2 mmol/L 22.9 20.7*  22.9 26.5   BUN mg/dL 16.2 11.7  11.8 14.2   CREATININE mg/dL 0.91 0.89  0.90 0.88   GLUCOSE mg/dL 109* 116*  118* 95   ALBUMIN g/dL  --  4.4  --    BILIRUBIN mg/dL  --  1.7*  --    ALK PHOS U/L  --  34*  --    AST (SGOT) U/L  --  31  --    ALT (SGPT) U/L  --  31  --    AMYLASE U/L  --  302*  --    LIPASE U/L  --  816*  --      ABG      UA      SANTY  No results found for: \"POCMETH\", \"POCAMPHET\", \"POCBARBITUR\", \"POCBENZO\", \"POCCOCAINE\", \"POCOPIATES\", \"POCOXYCODO\", \"POCPHENCYC\", \"POCPROPOXY\", \"POCTHC\", \"POCTRICYC\"  Lysis Labs   Results from last 7 days   Lab Units 07/09/25 2326 07/09/25 0443 07/08/25  1413   HEMOGLOBIN g/dL 15.1 15.0 14.7   PLATELETS 10*3/mm3 234 230 243   CREATININE mg/dL 0.91 0.89  0.90 0.88     Radiology(recent) US Abdomen Limited  Result Date: 7/9/2025  1.Gallbladder is distended with stones and sludge. No gallbladder wall thickening. Negative sonographic Ames's sign. No biliary obstruction. 2.Trace free fluid adjacent to the liver. Electronically Signed: Giovani Abel MD  7/9/2025 11:35 PM EDT  Workstation ID: [id number]    CT Angiogram Chest  Result Date: 7/8/2025  Impression: CT scan of the chest with IV contrast demonstrating no findings of PE. Coronary artery calcifications. CT scan of the abdomen and pelvis demonstrating " fatty liver. Enlarged prostate gland with metallic densities consistent with radiation therapy. Mild diverticulosis of the descending colon and sigmoid colon without diverticulitis. Electronically Signed: Se Bowles MD  7/8/2025 9:42 PM EDT  Workstation ID: [id number]    CT Abdomen Pelvis With Contrast  Result Date: 7/8/2025  Impression: CT scan of the chest with IV contrast demonstrating no findings of PE. Coronary artery calcifications. CT scan of the abdomen and pelvis demonstrating fatty liver. Enlarged prostate gland with metallic densities consistent with radiation therapy. Mild diverticulosis of the descending colon and sigmoid colon without diverticulitis. Electronically Signed: Se Bowles MD  7/8/2025 9:42 PM EDT  Workstation ID: [id number]    XR Chest 1 View  Result Date: 7/8/2025  Impression: No acute cardiopulmonary disease. Electronically Signed: Charbel Harvey MD  7/8/2025 2:28 PM EDT  Workstation ID: [id number]      Imaging Results (Last 24 Hours)       Procedure Component Value Units Date/Time    US Abdomen Limited [[id number]] Collected: 07/09/25 2332     Updated: 07/09/25 2337    Narrative:      US ABDOMEN LIMITED    INDICATION:  Epigastric pain.    COMPARISON:  CT abdomen pelvis 7/8/2025    FINDINGS:  PANCREAS: The pancreas is obscured due to overlying bowel gas.    LIVER: The echogenicity and echotexture of the hepatic parenchyma is within normal limits.  No hepatic mass. There is a cyst in the left hepatic lobe measuring up to 1.8 cm. The intrahepatic bile ducts are normal in caliber. The common duct is normal in   size at the rory hepatis measuring 3-4 mm.    GALLBLADDER: Distended. Stones and sludge are noted. No gallbladder wall thickening. Negative sonographic Ames's sign.    RIGHT KIDNEY: The right kidney measures 11.9 cm. Renal cortical thickness and echogenicity are normal. No hydronephrosis.    OTHER: Trace free fluid adjacent to the liver. Main portal vein is patent with a normal  hepatopetal direction of flow.      Impression:      1.Gallbladder is distended with stones and sludge. No gallbladder wall thickening. Negative sonographic Ames's sign. No biliary obstruction.  2.Trace free fluid adjacent to the liver.        Electronically Signed: Giovani Abel MD    7/9/2025 11:35 PM EDT    Workstation ID: [id number]            Results from last 7 days   Lab Units 07/08/25  1521   HSTROP T ng/L 8       EKG         I personally viewed and interpreted the patient's EKG/Telemetry data:        ECHOCARDIOGRAM:     Results for orders placed during the hospital encounter of 07/08/25    Adult Transthoracic Echo Complete w/ Color, Spectral and Contrast if necessary per protocol 07/09/2025 2035    Interpretation Summary    Left ventricular systolic function is normal. Left ventricular ejection fraction appears to be 61 - 65%.    Left ventricular diastolic function is consistent with (grade I) impaired relaxation.    There is moderate calcification of the aortic valve mainly affecting the right coronary cusp(s).    Estimated right ventricular systolic pressure from tricuspid regurgitation is normal (<35 mmHg).            Assessment & Plan            Unstable angina    Epigastric abdominal pain        ASSESSMENT:    Unstable angina    Gallstones/sludge/pancreatitis   Abdominal ultrasound shows 1.Gallbladder is distended with stones and sludge. No gallbladder wall thickening. Negative sonographic Ames's sign. No biliary obstruction.  2.Trace free fluid adjacent to the liver.  AAA without rupture  Hypertension  Hyperlipidemia  OAB/BPH  leg cramps   MIREYA    PLAN:    Blood pressure stable on losartan and amlodipine,   Off nitroglycerin  Echo, stress test negative along with negative cardiac enzymes  Unlikely ACS or ischemia  GI consulted for new found gallbladder stones and sludge causing acute pancreatitis   Pending abdominal MRI  General surgery consulted  Electrolytes stable today- monitor and replace when  needed  Continue HLD medications when able, monitor leg cramps  AAA stable- recommended surveillance   Amylase and lipase elevated      Additional recommendations per Dr. Barrios          Electronically signed by MARIA DEL ROSARIO Segovia, 07/10/25, 11:39 AM EDT.          MARIA DEL ROSARIO Segovia  07/10/25  11:39 EDT

## 2025-07-11 ENCOUNTER — APPOINTMENT (OUTPATIENT)
Dept: GENERAL RADIOLOGY | Facility: HOSPITAL | Age: 75
DRG: 418 | End: 2025-07-11
Payer: MEDICARE

## 2025-07-11 ENCOUNTER — INPATIENT HOSPITAL (OUTPATIENT)
Dept: URBAN - METROPOLITAN AREA HOSPITAL 84 | Facility: HOSPITAL | Age: 75
End: 2025-07-11
Payer: MEDICARE

## 2025-07-11 ENCOUNTER — ANESTHESIA EVENT (OUTPATIENT)
Dept: PERIOP | Facility: HOSPITAL | Age: 75
End: 2025-07-11
Payer: MEDICARE

## 2025-07-11 ENCOUNTER — ANESTHESIA (OUTPATIENT)
Dept: PERIOP | Facility: HOSPITAL | Age: 75
End: 2025-07-11
Payer: MEDICARE

## 2025-07-11 DIAGNOSIS — K85.10 BILIARY ACUTE PANCREATITIS WITHOUT NECROSIS OR INFECTION: ICD-10-CM

## 2025-07-11 DIAGNOSIS — K82.8 OTHER SPECIFIED DISEASES OF GALLBLADDER: ICD-10-CM

## 2025-07-11 LAB
ALBUMIN SERPL-MCNC: 3.6 G/DL (ref 3.5–5.2)
ALBUMIN/GLOB SERPL: 1.7 G/DL
ALP SERPL-CCNC: 30 U/L (ref 39–117)
ALT SERPL W P-5'-P-CCNC: 19 U/L (ref 1–41)
ANION GAP SERPL CALCULATED.3IONS-SCNC: 7.9 MMOL/L (ref 5–15)
AST SERPL-CCNC: 18 U/L (ref 1–40)
BASOPHILS # BLD AUTO: 0.03 10*3/MM3 (ref 0–0.2)
BASOPHILS NFR BLD AUTO: 0.5 % (ref 0–1.5)
BILIRUB SERPL-MCNC: 1.1 MG/DL (ref 0–1.2)
BUN SERPL-MCNC: 16 MG/DL (ref 8–23)
BUN/CREAT SERPL: 20 (ref 7–25)
CALCIUM SPEC-SCNC: 8.7 MG/DL (ref 8.6–10.5)
CHLORIDE SERPL-SCNC: 104 MMOL/L (ref 98–107)
CO2 SERPL-SCNC: 25.1 MMOL/L (ref 22–29)
CREAT SERPL-MCNC: 0.8 MG/DL (ref 0.76–1.27)
DEPRECATED RDW RBC AUTO: 43.5 FL (ref 37–54)
EGFRCR SERPLBLD CKD-EPI 2021: 92.9 ML/MIN/1.73
EOSINOPHIL # BLD AUTO: 0.2 10*3/MM3 (ref 0–0.4)
EOSINOPHIL NFR BLD AUTO: 3.7 % (ref 0.3–6.2)
ERYTHROCYTE [DISTWIDTH] IN BLOOD BY AUTOMATED COUNT: 12.5 % (ref 12.3–15.4)
GLOBULIN UR ELPH-MCNC: 2.1 GM/DL
GLUCOSE SERPL-MCNC: 116 MG/DL (ref 65–99)
HCT VFR BLD AUTO: 40 % (ref 37.5–51)
HGB BLD-MCNC: 13.5 G/DL (ref 13–17.7)
IMM GRANULOCYTES # BLD AUTO: 0.01 10*3/MM3 (ref 0–0.05)
IMM GRANULOCYTES NFR BLD AUTO: 0.2 % (ref 0–0.5)
LIPASE SERPL-CCNC: 48 U/L (ref 13–60)
LYMPHOCYTES # BLD AUTO: 1.57 10*3/MM3 (ref 0.7–3.1)
LYMPHOCYTES NFR BLD AUTO: 28.7 % (ref 19.6–45.3)
MAGNESIUM SERPL-MCNC: 1.9 MG/DL (ref 1.6–2.4)
MCH RBC QN AUTO: 31.9 PG (ref 26.6–33)
MCHC RBC AUTO-ENTMCNC: 33.8 G/DL (ref 31.5–35.7)
MCV RBC AUTO: 94.6 FL (ref 79–97)
MONOCYTES # BLD AUTO: 0.67 10*3/MM3 (ref 0.1–0.9)
MONOCYTES NFR BLD AUTO: 12.2 % (ref 5–12)
NEUTROPHILS NFR BLD AUTO: 2.99 10*3/MM3 (ref 1.7–7)
NEUTROPHILS NFR BLD AUTO: 54.7 % (ref 42.7–76)
NRBC BLD AUTO-RTO: 0 /100 WBC (ref 0–0.2)
PHOSPHATE SERPL-MCNC: 3.1 MG/DL (ref 2.5–4.5)
PLATELET # BLD AUTO: 204 10*3/MM3 (ref 140–450)
PMV BLD AUTO: 9.4 FL (ref 6–12)
POTASSIUM SERPL-SCNC: 3.7 MMOL/L (ref 3.5–5.2)
PROT SERPL-MCNC: 5.7 G/DL (ref 6–8.5)
RBC # BLD AUTO: 4.23 10*6/MM3 (ref 4.14–5.8)
SODIUM SERPL-SCNC: 137 MMOL/L (ref 136–145)
WBC NRBC COR # BLD AUTO: 5.47 10*3/MM3 (ref 3.4–10.8)

## 2025-07-11 PROCEDURE — 25010000002 FENTANYL CITRATE (PF) 100 MCG/2ML SOLUTION: Performed by: NURSE ANESTHETIST, CERTIFIED REGISTERED

## 2025-07-11 PROCEDURE — 25010000002 LIDOCAINE PF 2% 2 % SOLUTION: Performed by: NURSE ANESTHETIST, CERTIFIED REGISTERED

## 2025-07-11 PROCEDURE — 25010000002 PROPOFOL 200 MG/20ML EMULSION: Performed by: NURSE ANESTHETIST, CERTIFIED REGISTERED

## 2025-07-11 PROCEDURE — 25010000002 HYDROMORPHONE 1 MG/ML SOLUTION: Performed by: NURSE ANESTHETIST, CERTIFIED REGISTERED

## 2025-07-11 PROCEDURE — 85025 COMPLETE CBC W/AUTO DIFF WBC: CPT

## 2025-07-11 PROCEDURE — 47563 LAPARO CHOLECYSTECTOMY/GRAPH: CPT | Performed by: STUDENT IN AN ORGANIZED HEALTH CARE EDUCATION/TRAINING PROGRAM

## 2025-07-11 PROCEDURE — 25010000002 MAGNESIUM SULFATE PER 500 MG OF MAGNESIUM: Performed by: NURSE ANESTHETIST, CERTIFIED REGISTERED

## 2025-07-11 PROCEDURE — 25810000003 LACTATED RINGERS PER 1000 ML: Performed by: STUDENT IN AN ORGANIZED HEALTH CARE EDUCATION/TRAINING PROGRAM

## 2025-07-11 PROCEDURE — 25010000002 FENTANYL CITRATE (PF) 50 MCG/ML SOLUTION: Performed by: NURSE ANESTHETIST, CERTIFIED REGISTERED

## 2025-07-11 PROCEDURE — 25810000003 LACTATED RINGERS PER 1000 ML: Performed by: NURSE ANESTHETIST, CERTIFIED REGISTERED

## 2025-07-11 PROCEDURE — 25010000002 DEXAMETHASONE PER 1 MG: Performed by: NURSE ANESTHETIST, CERTIFIED REGISTERED

## 2025-07-11 PROCEDURE — 25510000002 IOHEXOL 300 MG/ML SOLUTION: Performed by: STUDENT IN AN ORGANIZED HEALTH CARE EDUCATION/TRAINING PROGRAM

## 2025-07-11 PROCEDURE — 25010000002 INDOCYANINE GREEN 25 MG RECONSTITUTED SOLUTION

## 2025-07-11 PROCEDURE — 25810000003 SODIUM CHLORIDE 0.9 % SOLUTION: Performed by: NURSE ANESTHETIST, CERTIFIED REGISTERED

## 2025-07-11 PROCEDURE — 83690 ASSAY OF LIPASE: CPT | Performed by: PHYSICIAN ASSISTANT

## 2025-07-11 PROCEDURE — BF141ZZ FLUOROSCOPY OF GALLBLADDER, BILE DUCTS AND PANCREATIC DUCTS USING LOW OSMOLAR CONTRAST: ICD-10-PCS | Performed by: STUDENT IN AN ORGANIZED HEALTH CARE EDUCATION/TRAINING PROGRAM

## 2025-07-11 PROCEDURE — 99232 SBSQ HOSP IP/OBS MODERATE 35: CPT | Mod: FS | Performed by: PHYSICIAN ASSISTANT

## 2025-07-11 PROCEDURE — 25010000002 METRONIDAZOLE 500 MG/100ML SOLUTION: Performed by: STUDENT IN AN ORGANIZED HEALTH CARE EDUCATION/TRAINING PROGRAM

## 2025-07-11 PROCEDURE — 80053 COMPREHEN METABOLIC PANEL: CPT | Performed by: PHYSICIAN ASSISTANT

## 2025-07-11 PROCEDURE — 25010000002 SUGAMMADEX 200 MG/2ML SOLUTION: Performed by: NURSE ANESTHETIST, CERTIFIED REGISTERED

## 2025-07-11 PROCEDURE — 74300 X-RAY BILE DUCTS/PANCREAS: CPT

## 2025-07-11 PROCEDURE — 25010000002 CEFTRIAXONE PER 250 MG: Performed by: STUDENT IN AN ORGANIZED HEALTH CARE EDUCATION/TRAINING PROGRAM

## 2025-07-11 PROCEDURE — 88304 TISSUE EXAM BY PATHOLOGIST: CPT | Performed by: STUDENT IN AN ORGANIZED HEALTH CARE EDUCATION/TRAINING PROGRAM

## 2025-07-11 PROCEDURE — 84100 ASSAY OF PHOSPHORUS: CPT

## 2025-07-11 PROCEDURE — 0FT44ZZ RESECTION OF GALLBLADDER, PERCUTANEOUS ENDOSCOPIC APPROACH: ICD-10-PCS | Performed by: STUDENT IN AN ORGANIZED HEALTH CARE EDUCATION/TRAINING PROGRAM

## 2025-07-11 PROCEDURE — 8E0W4CZ ROBOTIC ASSISTED PROCEDURE OF TRUNK REGION, PERCUTANEOUS ENDOSCOPIC APPROACH: ICD-10-PCS | Performed by: STUDENT IN AN ORGANIZED HEALTH CARE EDUCATION/TRAINING PROGRAM

## 2025-07-11 PROCEDURE — 83735 ASSAY OF MAGNESIUM: CPT

## 2025-07-11 PROCEDURE — 99232 SBSQ HOSP IP/OBS MODERATE 35: CPT | Performed by: NURSE PRACTITIONER

## 2025-07-11 PROCEDURE — 25010000002 ONDANSETRON PER 1 MG: Performed by: NURSE ANESTHETIST, CERTIFIED REGISTERED

## 2025-07-11 DEVICE — CLIP LIG HEMOLOK PA LG 6CT PRP: Type: IMPLANTABLE DEVICE | Site: ABDOMEN | Status: FUNCTIONAL

## 2025-07-11 RX ORDER — SODIUM CHLORIDE, SODIUM LACTATE, POTASSIUM CHLORIDE, CALCIUM CHLORIDE 600; 310; 30; 20 MG/100ML; MG/100ML; MG/100ML; MG/100ML
INJECTION, SOLUTION INTRAVENOUS CONTINUOUS PRN
Status: DISCONTINUED | OUTPATIENT
Start: 2025-07-11 | End: 2025-07-11 | Stop reason: SURG

## 2025-07-11 RX ORDER — DIPHENHYDRAMINE HYDROCHLORIDE 50 MG/ML
12.5 INJECTION, SOLUTION INTRAMUSCULAR; INTRAVENOUS
Status: DISCONTINUED | OUTPATIENT
Start: 2025-07-11 | End: 2025-07-11 | Stop reason: HOSPADM

## 2025-07-11 RX ORDER — ONDANSETRON 2 MG/ML
4 INJECTION INTRAMUSCULAR; INTRAVENOUS ONCE AS NEEDED
Status: DISCONTINUED | OUTPATIENT
Start: 2025-07-11 | End: 2025-07-11 | Stop reason: HOSPADM

## 2025-07-11 RX ORDER — ONDANSETRON 2 MG/ML
INJECTION INTRAMUSCULAR; INTRAVENOUS AS NEEDED
Status: DISCONTINUED | OUTPATIENT
Start: 2025-07-11 | End: 2025-07-11 | Stop reason: SURG

## 2025-07-11 RX ORDER — OXYCODONE HYDROCHLORIDE 5 MG/1
10 TABLET ORAL EVERY 4 HOURS PRN
Status: DISCONTINUED | OUTPATIENT
Start: 2025-07-11 | End: 2025-07-11 | Stop reason: HOSPADM

## 2025-07-11 RX ORDER — LIDOCAINE HYDROCHLORIDE 10 MG/ML
0.5 INJECTION, SOLUTION EPIDURAL; INFILTRATION; INTRACAUDAL; PERINEURAL ONCE AS NEEDED
Status: DISCONTINUED | OUTPATIENT
Start: 2025-07-11 | End: 2025-07-11 | Stop reason: HOSPADM

## 2025-07-11 RX ORDER — CEFTRIAXONE 1 G/1
2 INJECTION, POWDER, FOR SOLUTION INTRAMUSCULAR; INTRAVENOUS EVERY 24 HOURS
Status: DISCONTINUED | OUTPATIENT
Start: 2025-07-12 | End: 2025-07-12 | Stop reason: HOSPADM

## 2025-07-11 RX ORDER — PROPOFOL 10 MG/ML
INJECTION, EMULSION INTRAVENOUS AS NEEDED
Status: DISCONTINUED | OUTPATIENT
Start: 2025-07-11 | End: 2025-07-11 | Stop reason: SURG

## 2025-07-11 RX ORDER — NALOXONE HCL 0.4 MG/ML
0.4 VIAL (ML) INJECTION AS NEEDED
Status: DISCONTINUED | OUTPATIENT
Start: 2025-07-11 | End: 2025-07-11 | Stop reason: HOSPADM

## 2025-07-11 RX ORDER — INDOCYANINE GREEN AND WATER 25 MG
2.5 KIT INJECTION ONCE
Status: COMPLETED | OUTPATIENT
Start: 2025-07-11 | End: 2025-07-11

## 2025-07-11 RX ORDER — DIPHENHYDRAMINE HYDROCHLORIDE 50 MG/ML
12.5 INJECTION, SOLUTION INTRAMUSCULAR; INTRAVENOUS ONCE AS NEEDED
Status: DISCONTINUED | OUTPATIENT
Start: 2025-07-11 | End: 2025-07-11 | Stop reason: HOSPADM

## 2025-07-11 RX ORDER — EPHEDRINE SULFATE 5 MG/ML
INJECTION INTRAVENOUS AS NEEDED
Status: DISCONTINUED | OUTPATIENT
Start: 2025-07-11 | End: 2025-07-11 | Stop reason: SURG

## 2025-07-11 RX ORDER — PHENYLEPHRINE HCL IN 0.9% NACL 1 MG/10 ML
SYRINGE (ML) INTRAVENOUS AS NEEDED
Status: DISCONTINUED | OUTPATIENT
Start: 2025-07-11 | End: 2025-07-11 | Stop reason: SURG

## 2025-07-11 RX ORDER — FENTANYL CITRATE 50 UG/ML
INJECTION, SOLUTION INTRAMUSCULAR; INTRAVENOUS AS NEEDED
Status: DISCONTINUED | OUTPATIENT
Start: 2025-07-11 | End: 2025-07-11 | Stop reason: SURG

## 2025-07-11 RX ORDER — LIDOCAINE HYDROCHLORIDE 20 MG/ML
INJECTION, SOLUTION EPIDURAL; INFILTRATION; INTRACAUDAL; PERINEURAL AS NEEDED
Status: DISCONTINUED | OUTPATIENT
Start: 2025-07-11 | End: 2025-07-11 | Stop reason: SURG

## 2025-07-11 RX ORDER — LABETALOL HYDROCHLORIDE 5 MG/ML
5 INJECTION, SOLUTION INTRAVENOUS
Status: DISCONTINUED | OUTPATIENT
Start: 2025-07-11 | End: 2025-07-11 | Stop reason: HOSPADM

## 2025-07-11 RX ORDER — METRONIDAZOLE 500 MG/100ML
500 INJECTION, SOLUTION INTRAVENOUS EVERY 8 HOURS
Status: DISCONTINUED | OUTPATIENT
Start: 2025-07-12 | End: 2025-07-12 | Stop reason: HOSPADM

## 2025-07-11 RX ORDER — EPHEDRINE SULFATE 5 MG/ML
5 INJECTION INTRAVENOUS ONCE AS NEEDED
Status: DISCONTINUED | OUTPATIENT
Start: 2025-07-11 | End: 2025-07-11 | Stop reason: HOSPADM

## 2025-07-11 RX ORDER — MAGNESIUM SULFATE HEPTAHYDRATE 500 MG/ML
INJECTION, SOLUTION INTRAMUSCULAR; INTRAVENOUS AS NEEDED
Status: DISCONTINUED | OUTPATIENT
Start: 2025-07-11 | End: 2025-07-11 | Stop reason: SURG

## 2025-07-11 RX ORDER — HYDRALAZINE HYDROCHLORIDE 20 MG/ML
5 INJECTION INTRAMUSCULAR; INTRAVENOUS
Status: DISCONTINUED | OUTPATIENT
Start: 2025-07-11 | End: 2025-07-11 | Stop reason: HOSPADM

## 2025-07-11 RX ORDER — OXYCODONE HYDROCHLORIDE 5 MG/1
10 TABLET ORAL EVERY 4 HOURS PRN
Status: DISCONTINUED | OUTPATIENT
Start: 2025-07-11 | End: 2025-07-12 | Stop reason: HOSPADM

## 2025-07-11 RX ORDER — OXYCODONE HYDROCHLORIDE 5 MG/1
5 TABLET ORAL ONCE AS NEEDED
Status: DISCONTINUED | OUTPATIENT
Start: 2025-07-11 | End: 2025-07-11 | Stop reason: HOSPADM

## 2025-07-11 RX ORDER — DEXAMETHASONE SODIUM PHOSPHATE 4 MG/ML
INJECTION, SOLUTION INTRA-ARTICULAR; INTRALESIONAL; INTRAMUSCULAR; INTRAVENOUS; SOFT TISSUE AS NEEDED
Status: DISCONTINUED | OUTPATIENT
Start: 2025-07-11 | End: 2025-07-11 | Stop reason: SURG

## 2025-07-11 RX ORDER — IPRATROPIUM BROMIDE AND ALBUTEROL SULFATE 2.5; .5 MG/3ML; MG/3ML
3 SOLUTION RESPIRATORY (INHALATION) ONCE AS NEEDED
Status: DISCONTINUED | OUTPATIENT
Start: 2025-07-11 | End: 2025-07-11 | Stop reason: HOSPADM

## 2025-07-11 RX ORDER — CEFTRIAXONE 1 G/1
2 INJECTION, POWDER, FOR SOLUTION INTRAMUSCULAR; INTRAVENOUS ONCE
Status: COMPLETED | OUTPATIENT
Start: 2025-07-11 | End: 2025-07-11

## 2025-07-11 RX ORDER — SODIUM CHLORIDE, SODIUM LACTATE, POTASSIUM CHLORIDE, CALCIUM CHLORIDE 600; 310; 30; 20 MG/100ML; MG/100ML; MG/100ML; MG/100ML
1000 INJECTION, SOLUTION INTRAVENOUS ONCE
Status: COMPLETED | OUTPATIENT
Start: 2025-07-11 | End: 2025-07-11

## 2025-07-11 RX ORDER — METRONIDAZOLE 500 MG/100ML
500 INJECTION, SOLUTION INTRAVENOUS ONCE
Status: COMPLETED | OUTPATIENT
Start: 2025-07-11 | End: 2025-07-11

## 2025-07-11 RX ORDER — DEXTROSE MONOHYDRATE, SODIUM CHLORIDE, AND POTASSIUM CHLORIDE 50; 1.49; 4.5 G/1000ML; G/1000ML; G/1000ML
125 INJECTION, SOLUTION INTRAVENOUS CONTINUOUS
Status: DISCONTINUED | OUTPATIENT
Start: 2025-07-11 | End: 2025-07-11

## 2025-07-11 RX ORDER — HYDROMORPHONE HYDROCHLORIDE 1 MG/ML
0.5 INJECTION, SOLUTION INTRAMUSCULAR; INTRAVENOUS; SUBCUTANEOUS EVERY 4 HOURS PRN
Status: DISCONTINUED | OUTPATIENT
Start: 2025-07-11 | End: 2025-07-12 | Stop reason: HOSPADM

## 2025-07-11 RX ORDER — FLUMAZENIL 0.1 MG/ML
0.2 INJECTION INTRAVENOUS AS NEEDED
Status: DISCONTINUED | OUTPATIENT
Start: 2025-07-11 | End: 2025-07-11 | Stop reason: HOSPADM

## 2025-07-11 RX ORDER — SODIUM CHLORIDE 0.9 % (FLUSH) 0.9 %
10 SYRINGE (ML) INJECTION AS NEEDED
Status: DISCONTINUED | OUTPATIENT
Start: 2025-07-11 | End: 2025-07-11 | Stop reason: HOSPADM

## 2025-07-11 RX ORDER — ONDANSETRON 4 MG/1
4 TABLET, FILM COATED ORAL DAILY PRN
Qty: 30 TABLET | Refills: 0 | Status: SHIPPED | OUTPATIENT
Start: 2025-07-11 | End: 2025-07-12

## 2025-07-11 RX ORDER — ACETAMINOPHEN 325 MG/1
650 TABLET ORAL EVERY 4 HOURS PRN
Qty: 30 TABLET | Refills: 0 | Status: SHIPPED | OUTPATIENT
Start: 2025-07-11 | End: 2025-07-12

## 2025-07-11 RX ORDER — FENTANYL CITRATE 50 UG/ML
50 INJECTION, SOLUTION INTRAMUSCULAR; INTRAVENOUS
Status: DISCONTINUED | OUTPATIENT
Start: 2025-07-11 | End: 2025-07-11 | Stop reason: HOSPADM

## 2025-07-11 RX ORDER — OXYCODONE HYDROCHLORIDE 5 MG/1
5 TABLET ORAL EVERY 8 HOURS PRN
Qty: 15 TABLET | Refills: 0 | Status: SHIPPED | OUTPATIENT
Start: 2025-07-11 | End: 2025-07-12

## 2025-07-11 RX ORDER — OXYCODONE HYDROCHLORIDE 5 MG/1
5 TABLET ORAL EVERY 4 HOURS PRN
Status: DISCONTINUED | OUTPATIENT
Start: 2025-07-11 | End: 2025-07-12 | Stop reason: HOSPADM

## 2025-07-11 RX ORDER — ROCURONIUM BROMIDE 10 MG/ML
INJECTION, SOLUTION INTRAVENOUS AS NEEDED
Status: DISCONTINUED | OUTPATIENT
Start: 2025-07-11 | End: 2025-07-11 | Stop reason: SURG

## 2025-07-11 RX ORDER — SODIUM CHLORIDE 9 MG/ML
INJECTION, SOLUTION INTRAVENOUS CONTINUOUS PRN
Status: DISCONTINUED | OUTPATIENT
Start: 2025-07-11 | End: 2025-07-11 | Stop reason: SURG

## 2025-07-11 RX ADMIN — Medication 100 MCG: at 15:21

## 2025-07-11 RX ADMIN — HYDROMORPHONE HYDROCHLORIDE 1 MG: 1 INJECTION, SOLUTION INTRAMUSCULAR; INTRAVENOUS; SUBCUTANEOUS at 17:37

## 2025-07-11 RX ADMIN — SUGAMMADEX 200 MG: 100 INJECTION, SOLUTION INTRAVENOUS at 17:03

## 2025-07-11 RX ADMIN — DEXMEDETOMIDINE HYDROCHLORIDE 8 MCG: 400 INJECTION INTRAVENOUS at 16:30

## 2025-07-11 RX ADMIN — PROPOFOL 150 MG: 10 INJECTION, EMULSION INTRAVENOUS at 15:07

## 2025-07-11 RX ADMIN — TAMSULOSIN HYDROCHLORIDE 0.4 MG: 0.4 CAPSULE ORAL at 20:39

## 2025-07-11 RX ADMIN — DEXAMETHASONE SODIUM PHOSPHATE 4 MG: 4 INJECTION, SOLUTION INTRA-ARTICULAR; INTRALESIONAL; INTRAMUSCULAR; INTRAVENOUS; SOFT TISSUE at 15:17

## 2025-07-11 RX ADMIN — POTASSIUM CHLORIDE, DEXTROSE MONOHYDRATE AND SODIUM CHLORIDE 125 ML/HR: 150; 5; 450 INJECTION, SOLUTION INTRAVENOUS at 07:58

## 2025-07-11 RX ADMIN — SODIUM CHLORIDE, POTASSIUM CHLORIDE, SODIUM LACTATE AND CALCIUM CHLORIDE 1000 ML: 600; 310; 30; 20 INJECTION, SOLUTION INTRAVENOUS at 12:59

## 2025-07-11 RX ADMIN — FENTANYL CITRATE 50 MCG: 50 INJECTION, SOLUTION INTRAMUSCULAR; INTRAVENOUS at 15:03

## 2025-07-11 RX ADMIN — MAGNESIUM SULFATE HEPTAHYDRATE 2 G: 500 INJECTION, SOLUTION INTRAMUSCULAR; INTRAVENOUS at 15:25

## 2025-07-11 RX ADMIN — ROCURONIUM BROMIDE 10 MG: 10 INJECTION INTRAVENOUS at 16:25

## 2025-07-11 RX ADMIN — FENTANYL CITRATE 50 MCG: 50 INJECTION, SOLUTION INTRAMUSCULAR; INTRAVENOUS at 18:04

## 2025-07-11 RX ADMIN — DEXMEDETOMIDINE HYDROCHLORIDE 4 MCG: 400 INJECTION INTRAVENOUS at 16:04

## 2025-07-11 RX ADMIN — CEFTRIAXONE SODIUM 2 G: 2 INJECTION, POWDER, FOR SOLUTION INTRAMUSCULAR; INTRAVENOUS at 14:54

## 2025-07-11 RX ADMIN — FENTANYL CITRATE 50 MCG: 50 INJECTION, SOLUTION INTRAMUSCULAR; INTRAVENOUS at 15:40

## 2025-07-11 RX ADMIN — Medication 10 ML: at 20:39

## 2025-07-11 RX ADMIN — ONDANSETRON 4 MG: 2 INJECTION, SOLUTION INTRAMUSCULAR; INTRAVENOUS at 15:17

## 2025-07-11 RX ADMIN — SODIUM CHLORIDE, SODIUM LACTATE, POTASSIUM CHLORIDE, AND CALCIUM CHLORIDE: .6; .31; .03; .02 INJECTION, SOLUTION INTRAVENOUS at 15:00

## 2025-07-11 RX ADMIN — DOXEPIN HYDROCHLORIDE 10 MG: 10 CAPSULE ORAL at 22:30

## 2025-07-11 RX ADMIN — ROCURONIUM BROMIDE 30 MG: 10 INJECTION INTRAVENOUS at 15:39

## 2025-07-11 RX ADMIN — Medication 10 ML: at 07:59

## 2025-07-11 RX ADMIN — EPHEDRINE SULFATE 5 MG: 5 INJECTION INTRAVENOUS at 15:21

## 2025-07-11 RX ADMIN — ROCURONIUM BROMIDE 50 MG: 10 INJECTION INTRAVENOUS at 15:07

## 2025-07-11 RX ADMIN — METRONIDAZOLE 500 MG: 500 INJECTION, SOLUTION INTRAVENOUS at 12:57

## 2025-07-11 RX ADMIN — SODIUM CHLORIDE: 9 INJECTION, SOLUTION INTRAVENOUS at 16:04

## 2025-07-11 RX ADMIN — DEXMEDETOMIDINE HYDROCHLORIDE 4 MCG: 400 INJECTION INTRAVENOUS at 15:49

## 2025-07-11 RX ADMIN — HYDROMORPHONE HYDROCHLORIDE 0.5 MG: 1 INJECTION, SOLUTION INTRAMUSCULAR; INTRAVENOUS; SUBCUTANEOUS at 17:01

## 2025-07-11 RX ADMIN — LIDOCAINE HYDROCHLORIDE 100 MG: 20 INJECTION, SOLUTION EPIDURAL; INFILTRATION; INTRACAUDAL; PERINEURAL at 15:07

## 2025-07-11 RX ADMIN — DEXMEDETOMIDINE HYDROCHLORIDE 12 MCG: 400 INJECTION INTRAVENOUS at 15:35

## 2025-07-11 RX ADMIN — INDOCYANINE GREEN AND WATER 2.5 MG: KIT at 11:11

## 2025-07-11 RX ADMIN — HYDROMORPHONE HYDROCHLORIDE 0.5 MG: 1 INJECTION, SOLUTION INTRAMUSCULAR; INTRAVENOUS; SUBCUTANEOUS at 17:03

## 2025-07-11 NOTE — ANESTHESIA PREPROCEDURE EVALUATION
Anesthesia Evaluation     Patient summary reviewed and Nursing notes reviewed   NPO Solid Status: > 8 hours  NPO Liquid Status: > 8 hours           Airway   Mallampati: I  TM distance: >3 FB  Neck ROM: full  No difficulty expected  Dental      Pulmonary - normal exam    breath sounds clear to auscultation  (+) a smoker Former,sleep apnea  Cardiovascular - normal exam  Exercise tolerance: unable to assess    ECG reviewed  Rhythm: regular  Rate: normal    (+) hypertension, CAD, PVD, hyperlipidemia  (-) angina    ROS comment: NL ECHO & Stress    Neuro/Psych  (+) tremors  GI/Hepatic/Renal/Endo    (+) obesity    Musculoskeletal (-) negative ROS    Abdominal    Substance History - negative use     OB/GYN negative ob/gyn ROS         Other - negative ROS       ROS/Med Hx Other: Dr. Pimentel (Hospitalist)     Reason for Consult:  Cholecystitis     Chief Complaint:  Abdominal pain     History of Present Illness:  Giovani Moseley is a 75 y.o. male who presents to [facility] c/o chest pressure.  Acute coronary workup was negative.  Patient then began complaining of right shoulder pain for which workup, completed labs and imaging, demonstrate evidence of acute cholecystitis with hyperbilirubinemia.  Gastroenterology was consulted and MRCP was performed, which was negative for choledocholithiasis.  General surgery was then consulted for further recommendations.      Medical History  Past Medical History:  Diagnosis Date  · AAA (abdominal aortic aneurysm) without rupture 03/31/2021  · Abnormal ECG 01/18/2019    I believe past submitted records reflect such  · Benign prostate hyperplasia    · Cataract    · Congenital heart 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    S/A  · Coronary artery 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    S/A  · Hyperlipidemia    · Hypertension 01/10/2024    Dr. 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 Stephens  · COLONOSCOPY      · EYE SURGERY Bilateral 10/3 and 10/15  · PROSTATE SURGERY   Dr. Payne ([place])    2019  · TONSILLECTOMY           Family History  Problem Relation Age of Onset  · Cancer Mother          Breast/ Pancreatic/  · Pancreatic cancer Mother    · Vision loss Mother          Mascular  · Cancer Father          Prostrate/  · Alzheimer's disease Father    · Cancer Brother          lung cancer (alive)  · Kidney disease Brother       Social History                      Anesthesia Plan    ASA 3     general with block     (Patient understands anesthesia not responsible for dental damage.  ? [facility] Dz, NL ECHO    TAP)  intravenous induction     Anesthetic plan, risks, benefits, and alternatives have been provided, discussed and informed consent has been obtained with: patient.    Use of blood products discussed with patient .    Plan discussed with CRNA.      CODE STATUS:    Code Status (Patient has no pulse and is not breathing): CPR (Attempt to Resuscitate)  Medical Interventions (Patient has pulse or is breathing): Full Support  Level Of Support Discussed With: Patient

## 2025-07-11 NOTE — CONSULTS
General Surgery Consult Note  Requesting Provider:  Dr. Pimentel (Hospitalist)    Reason for Consult:  Cholecystitis    Chief Complaint:  Abdominal pain    History of Present Illness:  Giovani Moseley is a 75 y.o. male who presents to [facility] c/o chest pressure.  Acute coronary workup was negative.  Patient then began complaining of right shoulder pain for which workup, completed labs and imaging, demonstrate evidence of acute cholecystitis with hyperbilirubinemia.  Gastroenterology was consulted and MRCP was performed, which was negative for choledocholithiasis.  General surgery was then consulted for further recommendations.     Past Medical History:   Diagnosis Date    AAA (abdominal aortic aneurysm) without rupture 2021    Abnormal ECG 2019    I believe past submitted records reflect such    Benign prostate hyperplasia     Cataract     Congenital heart disease 2015    S/A    Coronary artery disease 2015    S/A    Hyperlipidemia     Hypertension 01/10/2024    Dr. 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 Stephens    COLONOSCOPY      EYE SURGERY Bilateral 10/3 and 10/15    PROSTATE SURGERY  Dr. Payne ([place])    2019    TONSILLECTOMY       Family History   Problem Relation Age of Onset    Cancer Mother         Breast/ Pancreatic/    Pancreatic cancer Mother     Vision loss Mother         Mascular    Cancer Father         Prostrate/    Alzheimer's disease Father     Cancer Brother         lung cancer (alive)    Kidney disease Brother      Social History     Socioeconomic History    Marital status:    Tobacco Use    Smoking status: Former     Current packs/day: 0.00     Average packs/day: 1 pack/day for 35.0 years (35.0 ttl pk-yrs)     Types:  Cigarettes     Start date: 1965     Quit date: 2000     Years since quittin.5     Passive exposure: Past    Smokeless tobacco: Never    Tobacco comments:     Stopped roughly in beginning of . light to moderate smoker throughout my history   Vaping Use    Vaping status: Never Used   Substance and Sexual Activity    Alcohol use: Yes     Alcohol/week: 1.0 standard drink of alcohol     Types: 1 Cans of beer per week     Comment: light drinker when socializing    Drug use: No    Sexual activity: Not Currently       Medications:  Medications Prior to Admission   Medication Sig Dispense Refill Last Dose/Taking    Bempedoic Acid-Ezetimibe 180-10 MG tablet Take 1 tablet by mouth Daily. 90 tablet 3 2025    Coenzyme Q10 (CoQ10) 100 MG capsule Take 1 capsule by mouth Daily.   2025    doxepin (SINEquan) 10 MG capsule Take 1 capsule by mouth As Needed for Sleep. prn 90 capsule 3 2025    losartan (COZAAR) 25 MG tablet Take 1 tablet by mouth Daily. 90 tablet 3 2025    silodosin (RAPAFLO) 8 MG capsule capsule Take 1 capsule by mouth Daily.   2025    Vibegron (Gemtesa) 75 MG tablet Take 1 tablet by mouth Daily.   2025      No Known Allergies    Review of Systems:  Negative in a 12 point ROS except for as above described.    Physical Examination:  Temp:  [97.5 °F (36.4 °C)-98.5 °F (36.9 °C)] 97.7 °F (36.5 °C)  Heart Rate:  [56-81] 56  Resp:  [10-21] 10  BP: (106-127)/(56-82) 118/82  I/O last 3 completed shifts:  In: 716 [P.O.:716]  Out: 425 [Urine:425]    General: No acute distress, conversant  Eyes: Anicteric sclerae  Lungs: normal respiratory effort, bilateral chest rise with inspiration  Abdomen: soft, mild right upper quadrant tenderness, negative Ames's, ND, dull to percussion; no guarding or rigidity  Skin: No jaundice.    Labs:  WBC   Date Value Ref Range Status   2025 5.47 3.40 - 10.80 10*3/mm3 Final     Hemoglobin   Date Value Ref Range Status   2025 13.5 13.0 - 17.7 g/dL  "Final     Hematocrit   Date Value Ref Range Status   07/11/2025 40.0 37.5 - 51.0 % Final     MCV   Date Value Ref Range Status   07/11/2025 94.6 79.0 - 97.0 fL Final     Platelets   Date Value Ref Range Status   07/11/2025 204 140 - 450 10*3/mm3 Final     Neutrophil %   Date Value Ref Range Status   07/11/2025 54.7 42.7 - 76.0 % Final     Lab Results   Component Value Date    GLUCOSE 116 (H) 07/11/2025    BUN 16.0 07/11/2025    CREATININE 0.80 07/11/2025     07/11/2025    K 3.7 07/11/2025     07/11/2025    CALCIUM 8.7 07/11/2025    PROTEINTOT 5.7 (L) 07/11/2025    ALBUMIN 3.6 07/11/2025    ALT 19 07/11/2025    AST 18 07/11/2025    ALKPHOS 30 (L) 07/11/2025    BILITOT 1.1 07/11/2025    GLOB 2.1 07/11/2025    AGRATIO 1.7 07/11/2025    BCR 20.0 07/11/2025    ANIONGAP 7.9 07/11/2025    EGFR 92.9 07/11/2025     No results found for: \"INR\", \"PROTIME\"      No results found for: \"BLOODCX\"    Images:  MRI Abdomen With & Without Contrast  Narrative: MRI ABDOMEN W WO CONTRAST    Date of Exam: 7/10/2025 11:50 AM EDT    Indication: pancreatitis, dilated pancreas duct - attention to pancreas please.     Comparison: CT abdomen and pelvis 7/8/2025. Right upper quadrant ultrasound 7/9/2025. CT angiography of the chest 7/8/2025.    Technique:  Routine multiplanar/multisequence images of the abdomen were obtained before and after the uneventful administration of 19 cc Prohance.    Findings:    The gallbladder is distended up to 4.9 cm transversely. The gallbladder wall is diffusely thickened and hyperenhancing, and pericholecystic fluid is present. Findings are consistent with the appearance of acute cholecystitis.    T1 precontrast high signal intensity in the gallbladder lumen consistent with gallbladder sludge. Multiple tiny gallstones are present.    No choledocholithiasis is seen.    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 Common bile duct caliber is within normal limits, 4 mm.   Common hepatic duct measures 5 to 6 mm. No intrahepatic biliary ductal dilation is identified.    Multiple small hepatic cysts are seen, dominant index cyst in the right hepatic lobe inferiorly measuring 12 mm. Liver size is normal. No evidence of cirrhosis. Transient hyperemic changes are seen in the right hepatic lobe adjacent to the gallbladder   fossa. No suspicious liver lesions are identified. No significant signal drop on opposed phase imaging, and no evidence of significant hepatic steatosis.    A cyst in the posterior left mid kidney measures 1.3 cm. Right kidney is within normal limits.  The spleen and adrenal glands are normal.    The pancreas maintains normal high T1 precontrast signal intensity. The pancreas does not appear particularly edematous or inflamed. Pancreatic duct caliber is within normal limits. No indication of pancreatic divisum morphology. No pseudocyst is seen.    SMA, splenic vein, portal vein, hepatic veins, IVC remain opacified and patent. 2.9 cm ectasia of the suprarenal abdominal aorta without justin aneurysm.    The stomach, and the imaged portions of the large and small bowel, do not appear thickened or inflamed. Diverticular changes are present in the descending colon.    Mild posterior right basilar atelectasis. Normal heart size.    Osseous structures are within normal limits.  Impression: 1. Acute cholecystitis. Gallbladder sludge and gallstones are present. No definite choledocholithiasis or biliary dilation is appreciated.  2. Unremarkable MR appearance of the pancreas. No evidence of active pancreatic inflammation, mass, ductal dilation, or pseudocyst.  3. Low confluence of the common hepatic duct and cystic duct near the level of the pancreatic head, which should be recognized if surgical intervention is contemplated.  4. Additional findings include: Mild  posterior right basilar atelectasis, hepatic cysts, left renal cyst, descending colonic diverticulosis.    Electronically Signed: Yoana Interiano MD    7/10/2025 2:32 PM EDT    Workstation ID: [id number]       I personally reviewed the available laboratory values and radiographic studies.      Assessment:  75-year-old male with acute cholecystitis and hyperbilirubinemia.  MRCP negative for choledocholithiasis.  Would benefit from cholecystectomy.    The risks (postoperative pain, nausea/vomiting, bleeding, hematoma/seroma, incisional hernia, injury to any underlying viscera, bile leak, biliary stricture, need for ERCP, need for conversion to open surgery, and complications associated with general anesthesia including stroke, heart attack, and death), benefits, and alternative therapies of robotic cholecystectomy with intraoperative cholangiogram were discussed in great detail with the patient and his wife. The patient voiced understanding and wishes to proceed with surgery.     Plan:  OR tomorrow  NPO at midnight   Continue IV antibiotics  Rest of care per primary team     Please do not hesitate to call me with any questions or concerns.  Thank you for involving me in the care of your patient, and I look forward to taking care of the patient with you.     Anton Delcid MD   General Surgery  07/11/25   11:40 EDT     Much of this encounter note is an electronic transcription/translation of spoken language to printed text.  The electronic translation of spoken language may permit erroneous, or at times, nonsensical words or phrases to be inadvertently transcribed.  Although I have reviewed the note for such errors, some may still exist.

## 2025-07-11 NOTE — PROGRESS NOTES
LOS: 3 days   Patient Care Team:  Yvonne Santa MD as PCP - General (Family Medicine)  Patrick Crane MD as Consulting Physician (Urology)  Seipel, Joseph F, MD as Consulting Physician (Sleep Medicine)  Winnie Farley OD (Optometry)  Brandon Barrios MD as Consulting Physician (Cardiology)  Jacqui David MD as Consulting Physician (Dermatology)  Hollis Junior MD as Consulting Physician (Neurosurgery)  Michael Munoz MD as Consulting Physician (Urology)  Tl Huang RN as Ambulatory  ([facility])      Subjective     Interval History:     Subjective: Patient doing well today.  Denies abdominal pain, nausea, or vomiting.  Scheduled for cholecystectomy today.      ROS:   No chest pain, shortness of breath, or cough.        Medication Review:     Current Facility-Administered Medications:     acetaminophen (TYLENOL) tablet 650 mg, 650 mg, Oral, Q4H PRN **OR** acetaminophen (TYLENOL) 160 MG/5ML oral solution 650 mg, 650 mg, Oral, Q4H PRN **OR** acetaminophen (TYLENOL) suppository 650 mg, 650 mg, Rectal, Q4H PRN, Keaffer, Jennyfer G, APRN    amLODIPine (NORVASC) tablet 2.5 mg, 2.5 mg, Oral, Q24H, Brandon Barrios MD, 2.5 mg at 07/08/25 1947    sennosides-docusate (PERICOLACE) 8.6-50 MG per tablet 2 tablet, 2 tablet, Oral, BID PRN **AND** polyethylene glycol (MIRALAX) packet 17 g, 17 g, Oral, Daily PRN **AND** bisacodyl (DULCOLAX) EC tablet 5 mg, 5 mg, Oral, Daily PRN **AND** bisacodyl (DULCOLAX) suppository 10 mg, 10 mg, Rectal, Daily PRN, Rodahaffer, Jennyfer G, APRN    Calcium Replacement - Follow Nurse / BPA Driven Protocol, , Not Applicable, PRN, Rodahaffer, Jennyfer G, APRN    dextrose 5 % and sodium chloride 0.45 % with KCl 20 mEq/L infusion, 125 mL/hr, Intravenous, Continuous, Anton Delcid MD, Last Rate: 125 mL/hr at 07/11/25 0852, 125 mL/hr at 07/11/25 0852    doxepin (SINEquan) capsule 10 mg, 10 mg, Oral, Nightly, Salvador Pimentel DO, 10 mg at  07/10/25 2117    enoxaparin sodium (LOVENOX) syringe 90 mg, 1 mg/kg, Subcutaneous, BID, Jennyfer Gomez APRN, 90 mg at 07/10/25 1711    losartan (COZAAR) tablet 25 mg, 25 mg, Oral, Q24H, Brandon Barrios MD    Magnesium Standard Dose Replacement - Follow Nurse / BPA Driven Protocol, , Not Applicable, PRN, Jennyfer Gomez, APRN    nitroglycerin (NITROSTAT) SL tablet 0.4 mg, 0.4 mg, Sublingual, Q5 Min PRN, Jennyfer Gomez, APRN    ondansetron (ZOFRAN) injection 4 mg, 4 mg, Intravenous, Q6H PRN, Jennyfer Gmoez APRN    oxybutynin XL (DITROPAN-XL) 24 hr tablet 10 mg, 10 mg, Oral, Daily, Salvador Pimentel,     Phosphorus Replacement - Follow Nurse / BPA Driven Protocol, , Not Applicable, PRN, Jennyfer Gomez, APRN    Potassium Replacement - Follow Nurse / BPA Driven Protocol, , Not Applicable, PRN, Jennyfer Gomez, APRN    [COMPLETED] Insert Peripheral IV, , , Once **AND** sodium chloride 0.9 % flush 10 mL, 10 mL, Intravenous, PRN, Yvan Triana MD    sodium chloride 0.9 % flush 10 mL, 10 mL, Intravenous, Q12H, Jennyfer Gomez, APRN, 10 mL at 07/11/25 0759    sodium chloride 0.9 % infusion 40 mL, 40 mL, Intravenous, PRN, Jennyfer Gomez, APRN    tamsulosin (FLOMAX) 24 hr capsule 0.4 mg, 0.4 mg, Oral, Nightly, Salvador Pimentel, , 0.4 mg at 07/10/25 2117      Objective     Vital Signs  Vitals:    07/10/25 2317 07/11/25 0431 07/11/25 0700 07/11/25 1100   BP: 127/71 114/74 122/67 118/82   BP Location: Right arm Right arm Right arm Left arm   Patient Position: Lying Lying Lying Lying   Pulse: 73 61 56 56   Resp: 21 17 13 10   Temp: 98.5 °F (36.9 °C) 97.9 °F (36.6 °C) 98 °F (36.7 °C) 97.7 °F (36.5 °C)   TempSrc: Oral Oral Oral Oral   SpO2: 93% 95%  95%   Weight:       Height:           Physical Exam:     General Appearance:    Awake and alert, in no acute distress   Head:    Normocephalic, without obvious abnormality   Eyes:          Conjunctivae normal, anicteric sclera       Neck:   No JVD    Lungs:     Respirations regular, even and unlabored   Abdomen:     Soft, non-tender, no rebound or guarding, non-distended       Extremities:   No edema, no cyanosis   Skin:   No bruising or rash, no jaundice        Results Review:    Results from last 7 days   Lab Units 07/11/25  0116 07/09/25  2326 07/09/25  0443 07/08/25  1413   WBC 10*3/mm3 5.47 9.90 13.63* 5.91   HEMOGLOBIN g/dL 13.5 15.1 15.0 14.7   PLATELETS 10*3/mm3 204 234 230 243     Results from last 7 days   Lab Units 07/11/25  0116 07/09/25  2326 07/09/25  0443 07/08/25  1413   SODIUM mmol/L 137 139 139  137 140   POTASSIUM mmol/L 3.7 4.3 4.1  3.8 4.0   CHLORIDE mmol/L 104 103 103  102 103   CO2 mmol/L 25.1 22.9 20.7*  22.9 26.5   BUN mg/dL 16.0 16.2 11.7  11.8 14.2   CREATININE mg/dL 0.80 0.91 0.89  0.90 0.88   GLUCOSE mg/dL 116* 109* 116*  118* 95   ALBUMIN g/dL 3.6  --  4.4  --    BILIRUBIN mg/dL 1.1  --  1.7*  --    ALK PHOS U/L 30*  --  34*  --    AST (SGOT) U/L 18  --  31  --    ALT (SGPT) U/L 19  --  31  --    MAGNESIUM mg/dL 1.9 2.0 1.9  --    PHOSPHORUS mg/dL 3.1 2.5 3.6  --    AMYLASE U/L  --   --  302*  --    LIPASE U/L 48  --  816*  --    CRP mg/dL  --  9.85*  --   --      Estimated Creatinine Clearance: 87.5 mL/min (by C-G formula based on SCr of 0.8 mg/dL).  Lab Results   Component Value Date    HGBA1C 5.81 (H) 07/08/2025    HGBA1C 5.70 (H) 01/14/2025    HGBA1C 5.70 (H) 01/10/2024     Results from last 7 days   Lab Units 07/08/25  1521 07/08/25  1413   HSTROP T ng/L 8 9     Infection     UA      Microbiology Results (last 10 days)       ** No results found for the last 240 hours. **          Imaging Results (Last 72 Hours)       Procedure Component Value Units Date/Time    MRI Abdomen With & Without Contrast [[id number]] Collected: 07/10/25 1416     Updated: 07/10/25 1434    Narrative:      MRI ABDOMEN W WO CONTRAST    Date of Exam: 7/10/2025 11:50 AM EDT    Indication: pancreatitis, dilated pancreas duct - attention to pancreas  please.     Comparison: CT abdomen and pelvis 7/8/2025. Right upper quadrant ultrasound 7/9/2025. CT angiography of the chest 7/8/2025.    Technique:  Routine multiplanar/multisequence images of the abdomen were obtained before and after the uneventful administration of 19 cc Prohance.      Findings:      The gallbladder is distended up to 4.9 cm transversely. The gallbladder wall is diffusely thickened and hyperenhancing, and pericholecystic fluid is present. Findings are consistent with the appearance of acute cholecystitis.    T1 precontrast high signal intensity in the gallbladder lumen consistent with gallbladder sludge. Multiple tiny gallstones are present.    No choledocholithiasis is seen.    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 Common bile duct caliber is within normal limits, 4 mm.   Common hepatic duct measures 5 to 6 mm. No intrahepatic biliary ductal dilation is identified.    Multiple small hepatic cysts are seen, dominant index cyst in the right hepatic lobe inferiorly measuring 12 mm. Liver size is normal. No evidence of cirrhosis. Transient hyperemic changes are seen in the right hepatic lobe adjacent to the gallbladder   fossa. No suspicious liver lesions are identified. No significant signal drop on opposed phase imaging, and no evidence of significant hepatic steatosis.    A cyst in the posterior left mid kidney measures 1.3 cm. Right kidney is within normal limits.  The spleen and adrenal glands are normal.    The pancreas maintains normal high T1 precontrast signal intensity. The pancreas does not appear particularly edematous or inflamed. Pancreatic duct caliber is within normal limits. No indication of pancreatic divisum morphology. No pseudocyst is seen.    SMA, splenic vein, portal vein, hepatic veins, IVC remain opacified and  patent. 2.9 cm ectasia of the suprarenal abdominal aorta without justin aneurysm.        The stomach, and the imaged portions of the large and small bowel, do not appear thickened or inflamed. Diverticular changes are present in the descending colon.    Mild posterior right basilar atelectasis. Normal heart size.    Osseous structures are within normal limits.      Impression:      1. Acute cholecystitis. Gallbladder sludge and gallstones are present. No definite choledocholithiasis or biliary dilation is appreciated.  2. Unremarkable MR appearance of the pancreas. No evidence of active pancreatic inflammation, mass, ductal dilation, or pseudocyst.  3. Low confluence of the common hepatic duct and cystic duct near the level of the pancreatic head, which should be recognized if surgical intervention is contemplated.  4. Additional findings include: Mild posterior right basilar atelectasis, hepatic cysts, left renal cyst, descending colonic diverticulosis.      Electronically Signed: Yoana Interiano MD    7/10/2025 2:32 PM EDT    Workstation ID: [id number]    US Abdomen Limited [[id number]] Collected: 07/09/25 2332     Updated: 07/09/25 2337    Narrative:      US ABDOMEN LIMITED    INDICATION:  Epigastric pain.    COMPARISON:  CT abdomen pelvis 7/8/2025    FINDINGS:  PANCREAS: The pancreas is obscured due to overlying bowel gas.    LIVER: The echogenicity and echotexture of the hepatic parenchyma is within normal limits.  No hepatic mass. There is a cyst in the left hepatic lobe measuring up to 1.8 cm. The intrahepatic bile ducts are normal in caliber. The common duct is normal in   size at the rory hepatis measuring 3-4 mm.    GALLBLADDER: Distended. Stones and sludge are noted. No gallbladder wall thickening. Negative sonographic Ames's sign.    RIGHT KIDNEY: The right kidney measures 11.9 cm. Renal cortical thickness and echogenicity are normal. No hydronephrosis.    OTHER: Trace free fluid adjacent to the liver.  Main portal vein is patent with a normal hepatopetal direction of flow.      Impression:      1.Gallbladder is distended with stones and sludge. No gallbladder wall thickening. Negative sonographic Ames's sign. No biliary obstruction.  2.Trace free fluid adjacent to the liver.        Electronically Signed: Giovani Abel MD    7/9/2025 11:35 PM EDT    Workstation ID: [id number]    CT Angiogram Chest [[id number]] Collected: 07/08/25 2129     Updated: 07/08/25 2144    Narrative:      CT ANGIOGRAM CHEST, CT ABDOMEN PELVIS W CONTRAST    Date of Exam: 7/8/2025 9:00 PM EDT    Indication: unstable angina.    Comparison: CXR 7/8/2025    Technique: CTA of the chest was performed before and after the uneventful intravenous administration of iodinated contrast. Reconstructed coronal and sagittal images were also obtained. In addition, a 3-D volume rendered image was created for   interpretation. Automated exposure control and iterative reconstruction methods were used.      Findings:  The pulmonary arteries are well opacified. No filling defects are seen. There are no findings of PE.    Subsegmental atelectasis and/are linear fibrosis is seen at the lung bases.    No abnormality is seen at the thoracic inlet. Mediastinal windows reveal no mediastinal, hilar, or axillary adenopathy. Moderate coronary artery calcifications are evident. A small hiatal hernia is seen.    The liver is of normal size. The liver is of diffusely diminished density consistent with mild fatty infiltration. Scattered cysts are evident, the largest measure 1.4 cm. The gallbladder is not abnormally distended. No pancreatic or adrenal mass is   evident. The spleen is of normal size    Excreted contrast is seen in the renal collecting systems and ureters. There is no evidence of hydronephrosis. The urinary bladder is not abnormally distended. The prostate gland measures 7.2 cm in greatest transverse dimension. Metallic densities are   consistent with  radiation therapy.    The stomach is not abnormally distended. Small bowel loops are of normal caliber. The appendix is normal. Mild diverticulosis of the descending colon and sigmoid colon is evident.    Small umbilical hernia and bilateral inguinal hernias containing fat are evident.    Degenerative spurring is seen in the spine.      Impression:      Impression:  CT scan of the chest with IV contrast demonstrating no findings of PE.    Coronary artery calcifications.    CT scan of the abdomen and pelvis demonstrating fatty liver.    Enlarged prostate gland with metallic densities consistent with radiation therapy.    Mild diverticulosis of the descending colon and sigmoid colon without diverticulitis.        Electronically Signed: Se Bowles MD    7/8/2025 9:42 PM EDT    Workstation ID: [id number]    CT Abdomen Pelvis With Contrast [[id number]] Collected: 07/08/25 2129     Updated: 07/08/25 2144    Narrative:      CT ANGIOGRAM CHEST, CT ABDOMEN PELVIS W CONTRAST    Date of Exam: 7/8/2025 9:00 PM EDT    Indication: unstable angina.    Comparison: CXR 7/8/2025    Technique: CTA of the chest was performed before and after the uneventful intravenous administration of iodinated contrast. Reconstructed coronal and sagittal images were also obtained. In addition, a 3-D volume rendered image was created for   interpretation. Automated exposure control and iterative reconstruction methods were used.      Findings:  The pulmonary arteries are well opacified. No filling defects are seen. There are no findings of PE.    Subsegmental atelectasis and/are linear fibrosis is seen at the lung bases.    No abnormality is seen at the thoracic inlet. Mediastinal windows reveal no mediastinal, hilar, or axillary adenopathy. Moderate coronary artery calcifications are evident. A small hiatal hernia is seen.    The liver is of normal size. The liver is of diffusely diminished density consistent with mild fatty infiltration. Scattered  cysts are evident, the largest measure 1.4 cm. The gallbladder is not abnormally distended. No pancreatic or adrenal mass is   evident. The spleen is of normal size    Excreted contrast is seen in the renal collecting systems and ureters. There is no evidence of hydronephrosis. The urinary bladder is not abnormally distended. The prostate gland measures 7.2 cm in greatest transverse dimension. Metallic densities are   consistent with radiation therapy.    The stomach is not abnormally distended. Small bowel loops are of normal caliber. The appendix is normal. Mild diverticulosis of the descending colon and sigmoid colon is evident.    Small umbilical hernia and bilateral inguinal hernias containing fat are evident.    Degenerative spurring is seen in the spine.      Impression:      Impression:  CT scan of the chest with IV contrast demonstrating no findings of PE.    Coronary artery calcifications.    CT scan of the abdomen and pelvis demonstrating fatty liver.    Enlarged prostate gland with metallic densities consistent with radiation therapy.    Mild diverticulosis of the descending colon and sigmoid colon without diverticulitis.        Electronically Signed: Se Bowles MD    7/8/2025 9:42 PM EDT    Workstation ID: [id number]    XR Chest 1 View [[id number]] Collected: 07/08/25 1427     Updated: 07/08/25 1430    Narrative:      XR CHEST 1 VW    Date of Exam: 7/8/2025 2:14 PM EDT    Indication: Chest pain    Comparison: 2/17/2025    Findings:  The lungs are clear bilaterally. Pleural spaces are normal. Cardiac and mediastinal contours are within normal limits. Regional skeleton is within normal limits for age.      Impression:      Impression:  No acute cardiopulmonary disease.        Electronically Signed: Charbel Harvey MD    7/8/2025 2:28 PM EDT    Workstation ID: [id number]            Assessment & Plan     75-year-old male with history of AAA without rupture, hypertension, and fatty liver presented to [facility] ED on  7/8 with chest pressure.  Cardiac workup negative.  GI was consulted for epigastric pain.     ASSESSMENT:  Gallstone pancreatitis  Epigastric pain  Right shoulder pain  History of AAA without rupture  Leukocytosis-resolved     PLAN:  - RUQ ultrasound 7/9: Distended gallbladder with stones and sludge.  - MRI abdomen 7/10: Acute cholecystitis.  Gallbladder sludge and gallstones present.  No definite choledocholithiasis or biliary dilation is appreciated.  Unremarkable appearance of the pancreas.  Low confluence of the common hepatic duct and cystic duct near the level of the pancreatic head which should be recognized if surgical intervention is contemplated.    - No need for ERCP as no evidence of choledocholithiasis.  - Plan is for cholecystectomy today.  - Total bilirubin 1.1 from 1.7.  AST and ALT normal.  - Lipase 48 from 816.  - Lipid panel normal.  - Continue pantoprazole.  - GI will see as needed.      Electronically signed by Verito Mittal PA-C, 07/11/25, 11:55 AM EDT.

## 2025-07-11 NOTE — CASE MANAGEMENT/SOCIAL WORK
[facility]ued Stay Note  [facility]     Patient Name: Giovani Moseley  MRN: [id number]  Today's Date: 7/11/2025    Admit Date: 7/8/2025    Plan: Routine home with spouse   Discharge Plan       Row Name 07/11/25 0850       Plan    Plan Comments DC Barriers: cholecystectomy planned today, IVFs, NPO             Queenie Manning RN      [facility]  Office: [phone]  Cell: [phone]  Fax # [phone]

## 2025-07-11 NOTE — DISCHARGE INSTRUCTIONS
Post-Operative Instructions - robotic cholecystectomy with intraoperative cholaniogram:  No heavy lifting (>10-15 lbs) for at least 2 weeks   No driving while under the influence of narcotics   Ok to shower, no bathing/swimming for 2 weeks   Dr. Delcid's office will schedule you for a post-op visit on 7/18/25  Empty and monitor drain output daily.  Call Dr. Delcid's office once drain output is not bilious for drain removal.  Call Dr. Delcid's office with any other questions or concerns

## 2025-07-11 NOTE — OP NOTE
General Surgery Operative Report  Date: 07/11/25   Patient: Giovani Moseley  Surgeon: Anton Delcid MD   Assistant: AYDEN Tian; This surgery was assisted and facilitated by a certified surgical first assistant, who directly resulted in decreased operative time, anesthetic time, exposure of surgical field and possibly of an operative wound infection thereby decreasing patient morbidity and ultimately total expenditures.   Pre-operative Diagnosis:   Acute cholecystitis  Hyperbilirubinemia  Post-operative Diagnosis:   Gangrenous cholecystitis  Duct of Luschka leak  Procedure: Robotic cholecystectomy with intraoperative cholangiogram  Findings:   Severely inflamed, markedly dilated gallbladder with mucosal evidence of necrosis consistent with gangrenous cholecystitis  1 to 2 mm tubular structure containing bile identified at the superolateral aspect of the cystic plate consistent with a duct of Luschk. This was inadvertently divided during dissection of the gallbladder from the cystic plate.  The duct had no appreciable ICG during this maneuver, but immediately produced the dye following division of the structure.  This was ligated using multiple interrupted 4-0 Prolene sutures.  Wound Class: IV  Estimated blood loss: minimal   Specimen: gallbladder   Implants: 19 Bengali Lito drain  Complications: none    Indications:   Giovani Moseley is a 75 y.o. male who was admitted to [facility] for acute cholecystitis.  Preoperative labs demonstrated hyperbilirubinemia for which gastroenterology was consulted.  MRCP was negative for choledocholithiasis for which she was advised undergo a cholecystectomy with intraoperative cholangiogram.    Consent:   The risks (postoperative pain, nausea/vomiting, bleeding, hematoma/seroma, incisional hernia, injury to any underlying viscera, biliary stricture, bile leak, need for ERCP, need for conversion to open surgery, and complications associated with general anesthesia including  stroke, heart attack, and death), benefits, and alternative therapies of robotic cholecystectomy with intraoperative cholangiogram were discussed in great detail with the patient and his wife. The patient voiced understanding and wishes to proceed with surgery.    Operative details:   The patient was brought to the operating room and placed in supine position. General anesthesia was induced and the patient was successfully intubated by Anesthesiology.  The patient underwent a TAP block, which was performed by Anesthesiology. The abdomen was prepped and draped in the usual sterile fashion, after which a brief time-out was held in which the patient, the procedure, preoperative antibiotics, and fire risk were identified and agreed upon by all available members of the operating room staff.    Using a #15 blade, a 8 mm transverse incision was made to the left of the umbilicus.  Utilizing Optiview technique, the peritoneal cavity was accessed and pneumoperitoneum was established to a pressure of 15 mmHg.  The abdomen was explored for evidence of entry trauma; none was identified.  The patient was placed in reverse Trendelenburg position with the table tilted towards the patient's left side.  Under direct visualization, 8 mm robotic ports were placed in the right mid abdomen along the midclavicular and anterior axillary lines.  A 12 mm robotic port was then placed at Huang's point, after which the Optiview port was exchanged for an additional 8 mm robotic port.  The robot was then brought to the patient's left side and appropriately docked.    The gallbladder identified at the inferior edge of the right lobe of the liver.  It was markedly dilated inflamed and dilated with notable adhesions to the omentum.  These were taken down with electrocautery.  Due to the distended nature of the gallbladder, it could not be initially grasped.  Therefore, it was decompressed using electrocautery, which yielded otherwise  normal-appearing bile.  However, the mucosa appeared to be necrotic consistent with gangrenous cholecystitis.  The fundus of the gallbladder was retracted cephalad over the right lobe of the liver, after which the infundibulum was retracted towards patient's right side until the triangle of Calot was exposed.  Electrocautery was used to divide the peritoneal reflections of the gallbladder.  A mixture of blunt dissection and electrocautery was used to circumferentially dissect both the cystic duct and artery until a critical view of safety was obtained.  This portion of the case performed under ICG fluorescent imaging.    Due to the patient's preoperative hyperbilirubinemia, decision was made to proceed with an intraoperative cholangiogram.  The cystic duct was ligated with a Hem-o-chance clip near the neck of the gallbladder and a small ductotomy was fashioned sharply using monopolar scissors.  A Serna cholangiogram catheter was placed in the abdomen through a 14-gauge Angiocath.  This catheter was then directed into the cystic duct and secured using a fenestrated bipolar forcep.  Using full-strength Isovue contrast, a cholangiogram was performed under fluoroscopy.  There was appropriate antegrade filling of the cystic duct, the common bile duct, and the duodenum, as well as appropriate retrograde filling of the common, right, and left hepatic ducts.  There is no appreciable filling defects or contrast extravasation consistent with a normal cholangiogram.    The cholangiogram catheter was removed from the patient, after which the cystic duct was ligated distal to the ductotomy using hemoclips.  The cystic artery was then ligated in similar fashion.  Both structures were then divided sharply using monopolar scissors.  Electrocautery was then used to divide the gallbladder from the cystic plate.  During this maneuver, a 1 to 2 mm tubular structure was encountered over the superolateral aspect of the cystic plate.  This  was inadvertently divided, as there was no appreciable ICG filling of the structure, which otherwise appeared to be part of the peritoneal reflection of the gallbladder.  Once this occurred, the tubular structure was noted to produce bile.  This was further explored and found to be in almost complete transection.  However, the duct was markedly smaller than the cystic and common bile ducts.  In comparison to the recent cholangiogram, this was thought to be a duct of Luschka as compared to a major bile duct.  Therefore, the structure was ligated using multiple interrupted 4-0 Prolene sutures, which led to the sensation of further bile leakage.  The gallbladder was then placed to an Endo Catch bag and passed off the field as a specimen.    The cystic plate was reexplored and found to have no evidence of further ongoing bile leakage.  Hemostasis was achieved with direct pressure and electrocautery.  The Huang's point fascial defect was closed laparoscopically using a #0 Vicryl suture.  At this point, the robot was undocked.  Given the possibility of a future occult bile leak, decision was made to place a drain.  This was placed in the abdomen through the right lateralmost port and directed into the subhepatic space.  This was done to the skin using a 2-0 nylon suture.  All instruments were removed from the abdomen and pneumoperitoneum was released.  Each incision was closed using buried erupted 4-0 Monocryl sutures.  Exofin skin glue was then applied.    All instruments, sponge, and needle counts were correct at the end of the case.  The patient tolerated the procedure without any immediate complications. The patient was extubated in the operating room and was transported to the PACU in stable condition.     Disposition:   Return to floor  Monitor drain  Follow-up morning LFTs    Anton Delcid MD   General Surgery  07/11/25   17:36 EDT     Much of this encounter note is an electronic transcription/translation of  spoken language to printed text.  The electronic translation of spoken language may permit erroneous, or at times, nonsensical words or phrases to be inadvertently transcribed.  Although I have reviewed the note for such errors, some may still exist.

## 2025-07-11 NOTE — BRIEF OP NOTE
CHOLECYSTECTOMY LAPAROSCOPIC INTRAOPERATIVE CHOLANGIOGRAM WITH DAVINCI ROBOT  Progress Note    Giovani Moseley  7/11/2025    Pre-op Diagnosis:   acute cholecystitis  biliary dilation        Post-Op Diagnosis Codes:  gangrenous cholecystitis  duct of Luscka leak     Procedure(s):      Procedure(s):  CHOLECYSTECTOMY LAPAROSCOPIC INTRAOPERATIVE CHOLANGIOGRAM WITH DAVINCI ROBOT              Surgeon(s):  Anton Delcid MD    Anesthesia: General with Block    Staff:   Circulator: Alva Fischer RN; Jessica Ma RN  Radiology Technologist: Jacque Broderick  Scrub Person: Keerthi June  Assistant: Antione Reinoso CSFA  Assistant: Antione Reinoso CSFA    Estimated Blood Loss: minimal    Urine Voided: * No values recorded between 7/11/2025  2:58 PM and 7/11/2025  5:15 PM *    Specimens:                Specimens       ID Source Type Tests Collected By Collected At Frozen?    A Gallbladder Tissue TISSUE PATHOLOGY EXAM   Anton Delcid MD 7/11/25 1248               Drains:   Closed/Suction Drain Lateral RLQ Bulb 19 Fr. (Active)   Site Description Unable to view 07/11/25 1731   Dressing Status Clean;Dry;Intact 07/11/25 1731   Drainage Appearance Bloody 07/11/25 1731   Status To bulb suction 07/11/25 1731       Findings: as above       Complications: Duct of Luschka leak    Assistant: Antione Reinoso CSFA  was responsible for performing the following activities: Retraction, Suction, Irrigation, Suturing, and Closing and their skilled assistance was necessary for the success of this case.    Anton Delcid MD     Date: 7/11/2025  Time: 17:35 EDT

## 2025-07-11 NOTE — ANESTHESIA POSTPROCEDURE EVALUATION
Patient: Giovani Moseley    Procedure Summary       Date: 07/11/25 Room / Location: [facility] OR 09 / [facility] MAIN OR    Anesthesia Start: 1459 Anesthesia Stop: 1715    Procedure: CHOLECYSTECTOMY LAPAROSCOPIC INTRAOPERATIVE CHOLANGIOGRAM WITH ZankINCI ROBOT (Abdomen) Diagnosis:     Surgeons: Anton Delcid MD Provider: Ap Wilkins MD    Anesthesia Type: general with block ASA Status: 3            Anesthesia Type: general with block    Vitals  Vitals Value Taken Time   /78 07/11/25 17:28   Temp 97.9 °F (36.6 °C) 07/11/25 17:16   Pulse 73 07/11/25 17:28   Resp 15 07/11/25 17:21   SpO2 100 % 07/11/25 17:28   Vitals shown include unfiled device data.        Post Anesthesia Care and Evaluation    Patient location during evaluation: PACU  Patient participation: complete - patient participated  Level of consciousness: awake  Pain scale: See nurse's notes for pain score.  Pain management: adequate    Airway patency: patent  Anesthetic complications: No anesthetic complications  PONV Status: none  Cardiovascular status: acceptable  Respiratory status: acceptable and spontaneous ventilation  Hydration status: acceptable    Comments: Patient seen and examined postoperatively; vital signs stable; SpO2 greater than or equal to 90%; cardiopulmonary status stable; nausea/vomiting adequately controlled; pain adequately controlled; no apparent anesthesia complications; patient discharged from anesthesia care when discharge criteria were met

## 2025-07-11 NOTE — PROGRESS NOTES
CARDIOLOGY FOLLOW-UP PROGRESS NOTE      Reason for follow-up:    Unstable angina     Attending: Salvador Pimentel DO Subjective .     Patient is laying in bed today with some abdominal and epigastric pain. No cardiac events over night. Denies chest pain, palpitations, shortness of breath.      Review of Systems   Gastrointestinal:  Positive for abdominal pain.     14 point system reviewed and negative except what is noted and in HPI    Allergies: Patient has no known allergies.    Scheduled Meds:amLODIPine, 2.5 mg, Oral, Q24H  [Transfer Hold] doxepin, 10 mg, Oral, Nightly  [Transfer Hold] enoxaparin sodium, 1 mg/kg, Subcutaneous, BID  lactated ringers, 1,000 mL, Intravenous, Once  losartan, 25 mg, Oral, Q24H  [Transfer Hold] oxybutynin XL, 10 mg, Oral, Daily  [Transfer Hold] sodium chloride, 10 mL, Intravenous, Q12H  [Transfer Hold] tamsulosin, 0.4 mg, Oral, Nightly        Continuous Infusions:dextrose 5 % and sodium chloride 0.45 % with KCl 20 mEq/L, 125 mL/hr, Last Rate: 125 mL/hr (07/11/25 0852)        PRN Meds:.  [Transfer Hold] acetaminophen **OR** [Transfer Hold] acetaminophen **OR** [Transfer Hold] acetaminophen    [Transfer Hold] senna-docusate sodium **AND** [Transfer Hold] polyethylene glycol **AND** [Transfer Hold] bisacodyl **AND** [Transfer Hold] bisacodyl    [Transfer Hold] Calcium Replacement - Follow Nurse / BPA Driven Protocol    lidocaine PF 1%    [Transfer Hold] Magnesium Standard Dose Replacement - Follow Nurse / BPA Driven Protocol    [Transfer Hold] nitroglycerin    [Transfer Hold] ondansetron    [Transfer Hold] Phosphorus Replacement - Follow Nurse / BPA Driven Protocol    Potassium Replacement - Follow Nurse / BPA Driven Protocol    [COMPLETED] Insert Peripheral IV **AND** [Transfer Hold] sodium chloride    sodium chloride    [Transfer Hold] sodium chloride    Objective     VITAL SIGNS  Patient Vitals for the past 24 hrs:   BP Temp Temp src Pulse Resp SpO2   07/11/25 1211 138/48 -- -- 67  "12 96 %   07/11/25 1100 118/82 97.7 °F (36.5 °C) Oral 56 10 95 %   07/11/25 0700 122/67 98 °F (36.7 °C) Oral 56 13 --   07/11/25 0431 114/74 97.9 °F (36.6 °C) Oral 61 17 95 %   07/10/25 2317 127/71 98.5 °F (36.9 °C) Oral 73 21 93 %   07/10/25 1931 123/77 97.6 °F (36.4 °C) Oral 81 -- 95 %   07/10/25 1900 123/77 97.6 °F (36.4 °C) Oral 74 16 97 %   07/10/25 1643 106/56 98.1 °F (36.7 °C) Oral 75 14 --   07/10/25 1313 125/79 97.5 °F (36.4 °C) Oral 73 19 --        Flowsheet Rows      Flowsheet Row First Filed Value   Admission Height 170.2 cm (67\") Documented at 07/08/2025 1350   Admission Weight 89.4 kg (197 lb) Documented at 07/08/2025 1350            Body mass index is 31.73 kg/m².      Intake/Output Summary (Last 24 hours) at 7/11/2025 1218  Last data filed at 7/10/2025 1931  Gross per 24 hour   Intake 716 ml   Output --   Net 716 ml        TELEMETRY:     Sinus rhythm    Physical Exam:  Physical Exam     Constitutional:       General: He is not in acute distress.     Appearance: Normal appearance.   HENT:      Head: Normocephalic and atraumatic.   Eyes:      Extraocular Movements: Extraocular movements intact.      Pupils: Pupils are equal, round, and reactive to light.   Cardiovascular:      Rate and Rhythm: Normal rate and regular rhythm.   Pulmonary:      Effort: Pulmonary effort is normal.      Breath sounds: Normal breath sounds.   Abdominal:      General: Abdomen not distended. Bowel sounds are normal.      Palpations: Abdomen is soft, some pain with palpation.   Musculoskeletal:      Cervical back: Normal range of motion.   Skin:     General: Skin is warm and dry.   Neurological:      General: No focal deficit present.      Mental Status: He is alert and oriented to person, place, and time. Mental status is at baseline.   Psychiatric:         Mood and Affect: Mood normal.         Behavior: Behavior normal.         Thought Content: Thought content normal.     Results Review:   I reviewed the patient's new " "clinical results.    CBC    Results from last 7 days   Lab Units 07/11/25  0116 07/09/25 2326 07/09/25 0443 07/08/25  1413   WBC 10*3/mm3 5.47 9.90 13.63* 5.91   HEMOGLOBIN g/dL 13.5 15.1 15.0 14.7   PLATELETS 10*3/mm3 204 234 230 243     BMP   Results from last 7 days   Lab Units 07/11/25  0116 07/09/25 2326 07/09/25 0443 07/08/25  1413   SODIUM mmol/L 137 139 139  137 140   POTASSIUM mmol/L 3.7 4.3 4.1  3.8 4.0   CHLORIDE mmol/L 104 103 103  102 103   CO2 mmol/L 25.1 22.9 20.7*  22.9 26.5   BUN mg/dL 16.0 16.2 11.7  11.8 14.2   CREATININE mg/dL 0.80 0.91 0.89  0.90 0.88   GLUCOSE mg/dL 116* 109* 116*  118* 95   MAGNESIUM mg/dL 1.9 2.0 1.9  --    PHOSPHORUS mg/dL 3.1 2.5 3.6  --      Cr Clearance Estimated Creatinine Clearance: 87.5 mL/min (by C-G formula based on SCr of 0.8 mg/dL).  Coag     HbA1C   Lab Results   Component Value Date    HGBA1C 5.81 (H) 07/08/2025    HGBA1C 5.70 (H) 01/14/2025    HGBA1C 5.70 (H) 01/10/2024     Blood Glucose No results found for: \"POCGLU\"  Infection     CMP   Results from last 7 days   Lab Units 07/11/25  0116 07/09/25 2326 07/09/25 0443 07/08/25  1413   SODIUM mmol/L 137 139 139  137 140   POTASSIUM mmol/L 3.7 4.3 4.1  3.8 4.0   CHLORIDE mmol/L 104 103 103  102 103   CO2 mmol/L 25.1 22.9 20.7*  22.9 26.5   BUN mg/dL 16.0 16.2 11.7  11.8 14.2   CREATININE mg/dL 0.80 0.91 0.89  0.90 0.88   GLUCOSE mg/dL 116* 109* 116*  118* 95   ALBUMIN g/dL 3.6  --  4.4  --    BILIRUBIN mg/dL 1.1  --  1.7*  --    ALK PHOS U/L 30*  --  34*  --    AST (SGOT) U/L 18  --  31  --    ALT (SGPT) U/L 19  --  31  --    AMYLASE U/L  --   --  302*  --    LIPASE U/L 48  --  816*  --      ABG      UA      SANTY  No results found for: \"POCMETH\", \"POCAMPHET\", \"POCBARBITUR\", \"POCBENZO\", \"POCCOCAINE\", \"POCOPIATES\", \"POCOXYCODO\", \"POCPHENCYC\", \"POCPROPOXY\", \"POCTHC\", \"POCTRICYC\"  Lysis Labs   Results from last 7 days   Lab Units 07/11/25  0116 07/09/25  2326 07/09/25  0443 07/08/25  1413 "   HEMOGLOBIN g/dL 13.5 15.1 15.0 14.7   PLATELETS 10*3/mm3 204 234 230 243   CREATININE mg/dL 0.80 0.91 0.89  0.90 0.88     Radiology(recent) MRI Abdomen With & Without Contrast  Result Date: 7/10/2025  1. Acute cholecystitis. Gallbladder sludge and gallstones are present. No definite choledocholithiasis or biliary dilation is appreciated. 2. Unremarkable MR appearance of the pancreas. No evidence of active pancreatic inflammation, mass, ductal dilation, or pseudocyst. 3. Low confluence of the common hepatic duct and cystic duct near the level of the pancreatic head, which should be recognized if surgical intervention is contemplated. 4. Additional findings include: Mild posterior right basilar atelectasis, hepatic cysts, left renal cyst, descending colonic diverticulosis. Electronically Signed: Yoana Interiano MD  7/10/2025 2:32 PM EDT  Workstation ID: [id number]    US Abdomen Limited  Result Date: 7/9/2025  1.Gallbladder is distended with stones and sludge. No gallbladder wall thickening. Negative sonographic Ames's sign. No biliary obstruction. 2.Trace free fluid adjacent to the liver. Electronically Signed: Giovnai Abel MD  7/9/2025 11:35 PM EDT  Workstation ID: [id number]      Imaging Results (Last 24 Hours)       Procedure Component Value Units Date/Time    MRI Abdomen With & Without Contrast [[id number]] Collected: 07/10/25 1416     Updated: 07/10/25 1434    Narrative:      MRI ABDOMEN W WO CONTRAST    Date of Exam: 7/10/2025 11:50 AM EDT    Indication: pancreatitis, dilated pancreas duct - attention to pancreas please.     Comparison: CT abdomen and pelvis 7/8/2025. Right upper quadrant ultrasound 7/9/2025. CT angiography of the chest 7/8/2025.    Technique:  Routine multiplanar/multisequence images of the abdomen were obtained before and after the uneventful administration of 19 cc Prohance.      Findings:      The gallbladder is distended up to 4.9 cm transversely. The gallbladder wall is diffusely  thickened and hyperenhancing, and pericholecystic fluid is present. Findings are consistent with the appearance of acute cholecystitis.    T1 precontrast high signal intensity in the gallbladder lumen consistent with gallbladder sludge. Multiple tiny gallstones are present.    No choledocholithiasis is seen.    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 Common bile duct caliber is within normal limits, 4 mm.   Common hepatic duct measures 5 to 6 mm. No intrahepatic biliary ductal dilation is identified.    Multiple small hepatic cysts are seen, dominant index cyst in the right hepatic lobe inferiorly measuring 12 mm. Liver size is normal. No evidence of cirrhosis. Transient hyperemic changes are seen in the right hepatic lobe adjacent to the gallbladder   fossa. No suspicious liver lesions are identified. No significant signal drop on opposed phase imaging, and no evidence of significant hepatic steatosis.    A cyst in the posterior left mid kidney measures 1.3 cm. Right kidney is within normal limits.  The spleen and adrenal glands are normal.    The pancreas maintains normal high T1 precontrast signal intensity. The pancreas does not appear particularly edematous or inflamed. Pancreatic duct caliber is within normal limits. No indication of pancreatic divisum morphology. No pseudocyst is seen.    SMA, splenic vein, portal vein, hepatic veins, IVC remain opacified and patent. 2.9 cm ectasia of the suprarenal abdominal aorta without justin aneurysm.        The stomach, and the imaged portions of the large and small bowel, do not appear thickened or inflamed. Diverticular changes are present in the descending colon.    Mild posterior right basilar atelectasis. Normal heart size.    Osseous structures are within normal limits.      Impression:      1. Acute  cholecystitis. Gallbladder sludge and gallstones are present. No definite choledocholithiasis or biliary dilation is appreciated.  2. Unremarkable MR appearance of the pancreas. No evidence of active pancreatic inflammation, mass, ductal dilation, or pseudocyst.  3. Low confluence of the common hepatic duct and cystic duct near the level of the pancreatic head, which should be recognized if surgical intervention is contemplated.  4. Additional findings include: Mild posterior right basilar atelectasis, hepatic cysts, left renal cyst, descending colonic diverticulosis.      Electronically Signed: Yoana Interiano MD    7/10/2025 2:32 PM EDT    Workstation ID: [id number]            Results from last 7 days   Lab Units 07/08/25  1521   HSTROP T ng/L 8       EKG         I personally viewed and interpreted the patient's EKG/Telemetry data:        ECHOCARDIOGRAM:     Results for orders placed during the hospital encounter of 07/08/25    Adult Transthoracic Echo Complete w/ Color, Spectral and Contrast if necessary per protocol 07/09/2025  8:35 PM    Interpretation Summary    Left ventricular systolic function is normal. Left ventricular ejection fraction appears to be 61 - 65%.    Left ventricular diastolic function is consistent with (grade I) impaired relaxation.    There is moderate calcification of the aortic valve mainly affecting the right coronary cusp(s).    Estimated right ventricular systolic pressure from tricuspid regurgitation is normal (<35 mmHg).            Assessment & Plan            Unstable angina    Epigastric abdominal pain        ASSESSMENT:    Unstable angina    Gallstones/sludge/pancreatitis   Abdominal ultrasound shows 1.Gallbladder is distended with stones and sludge. No gallbladder wall thickening. Negative sonographic Ames's sign. No biliary obstruction.  2.Trace free fluid adjacent to the liver.  AAA without rupture  Hypertension  Hyperlipidemia  OAB/BPH  leg cramps   MIREYA    PLAN:    Blood  pressure stable on losartan and amlodipine,   Off nitroglycerin  Echo, stress test negative along with negative cardiac enzymes  Unlikely ACS or ischemia  GI consulted for new found gallbladder stones and sludge causing acute pancreatitis - plan for OR today with general surgery  Electrolytes stable today- monitor and replace when needed  Continue HLD medications when able, monitor leg cramps  AAA stable- recommended surveillance   Amylase and lipase elevated  LFTs normal      Additional recommendations per Dr. Ferguson          Electronically signed by MARIA DEL ROSARIO Segovia, 07/10/25, 11:39 AM EDT.          MARIA DEL ROSARIO Segovia  07/11/25  12:18 EDT

## 2025-07-11 NOTE — PROGRESS NOTES
"Hospitalist Service   Daily Progress Note      Patient Name: Giovani Moseley  : 1950  MRN: [id number]  Primary Care Physician:  Yvonne Santa MD  Date of admission: 2025      Subjective        Patient seen and examined this morning.  Doing well this morning, denies any complaints.  Awaiting lap katlyn today.    ROS: Pertinent positives as noted in HPI/subjective.  All other systems were reviewed and are negative.      Objective      Vitals:   Temp:  [97.5 °F (36.4 °C)-98.5 °F (36.9 °C)] 98 °F (36.7 °C)  Heart Rate:  [56-81] 56  Resp:  [13-21] 13  BP: (106-127)/(56-79) 122/67    Physical Exam:    General: Awake, alert, NAD  Eyes: PERRL, EOMI, conjunctivae are clear  Cardiovascular: Regular rate and rhythm, no murmurs  Respiratory: Clear to auscultation bilaterally, no wheezing or rales, unlabored breathing  Abdomen: Soft, nontender, positive bowel sounds, no guarding  Neurologic: A&O, CN grossly intact, moves all extremities spontaneously  Musculoskeletal: Normal range of motion, no other gross deformities  Skin: Warm, dry         Result Review    Result Review:  I have personally reviewed the results from the time of this admission to 2025 09:53 EDT and agree with these findings:  [x]  Laboratory  []  Microbiology  [x]  Radiology  []  EKG/Telemetry   []  Cardiology/Vascular   []  Pathology  []  Old records  []  Other:          Assessment & Plan      Brief Patient Summary:  Giovani Moseley is a 75 y.o. male with a CMH of AAA without rupture, hypertension, hyperlipidemia, OAB, BPH, leg cramps who presented to [facility] on 2025 with chest pressure.  Patient reported he has had symptoms all day, describes as \" a lot of discomfort, feels like pressure up here\" pointing to epigastric region.  Patient reported for the past couple of weeks he has been having leg cramping at nighttime, so he cut back on his dose of losartan and nexlizet to half dose, and reported sometimes he skips doses.  Patient " reported he vomited 3 times in the emergency department.  ACS ruled out, troponin negative.  Due to AAA history, CTA chest abdomen pelvis negative for any acute findings.  Cardiology consulted.  Stress test and echo showing stable findings.  Further evaluation by GI was ordered.  MRCP positive for cholecystitis, general surgery consulted.      amLODIPine, 2.5 mg, Oral, Q24H  doxepin, 10 mg, Oral, Nightly  enoxaparin sodium, 1 mg/kg, Subcutaneous, BID  indocyanine green, 2.5 mg, Intravenous, Once  losartan, 25 mg, Oral, Q24H  oxybutynin XL, 10 mg, Oral, Daily  pantoprazole, 40 mg, Intravenous, Nightly  sodium chloride, 10 mL, Intravenous, Q12H  tamsulosin, 0.4 mg, Oral, Nightly       dextrose 5 % and sodium chloride 0.45 % with KCl 20 mEq/L, 125 mL/hr, Last Rate: 125 mL/hr (07/11/25 2841)         I have utilized all available, immediate resources to obtain, update, or review the patient's current medications including all prescriptions, over-the-counter products, herbals, cannabis/cannabidiol products, and vitamin.mineral/dietary (nutritional) supplements.    Active Hospital Problems:  Active Hospital Problems    Diagnosis     **Unstable angina     Epigastric abdominal pain        Assessment/Plan:       Epigastric pain secondary to cholelithiasis with cholecystitis  -Right upper quadrant ultrasound shows gallbladder stones and sludge but no acute cholecystitis or obstruction  -MRCP consistent with acute cholecystitis and gallbladder sludge but no definite choledocholithiasis or bile duct dilation  -General Surgery on board, plan for lap katlyn with IntraOp cholangiogram today  -GI following  -Continue supportive care    Substernal chest pain/pressure, improved  -ACS ruled out, troponin negative  -CTA chest abdomen pelvis negative for any acute findings  -Stress test negative, echo with preserved EF noted  -Continue aspirin, statin  -Cardiology following    Hypertension  Hyperlipidemia  BPH  -Continue home meds as  tolerated, monitor    VTE Prophylaxis:  Pharmacologic VTE prophylaxis orders are present.        CODE STATUS:    Code Status (Patient has no pulse and is not breathing): CPR (Attempt to Resuscitate)  Medical Interventions (Patient has pulse or is breathing): Full Support  Level Of Support Discussed With: Patient      Disposition Planning:     Barriers to Discharge: GI testing  Anticipated Date of Discharge: 7/12  Place of Discharge: Home    Electronically signed by Salvador Pimentel DO, 07/11/25, 09:53 EDT.  [facility], [place] Hospitalist Team      Part of this note may be an electronic transcription/translation of spoken language to printed text using the Dragon Dictation System.

## 2025-07-11 NOTE — ANESTHESIA PROCEDURE NOTES
Airway  Reason: elective    Date/Time: 7/11/2025 3:10 PM  Airway not difficult    General Information and Staff    Patient location during procedure: OR    Indications and Patient Condition  Indications for airway management: airway protection    Preoxygenated: yes    Mask difficulty assessment: 2 - vent by mask + OA or adjuvant +/- NMBA    Final Airway Details    Final airway type: endotracheal airway      Successful airway: ETT  Cuffed: yes   Successful intubation technique: direct laryngoscopy  Adjuncts used in placement: cricoid pressure  Blade: Mike  Blade size: 4  ETT size (mm): 7.5  Cormack-Lehane Classification: grade IIb - view of arytenoids or posterior of glottis only  Placement verified by: capnometry   Measured from: lips  ETT/EBT  to lips (cm): 23  Number of attempts at approach: 1  Assessment: lips, teeth, and gum same as pre-op

## 2025-07-11 NOTE — PLAN OF CARE
Pt A&OX4 this shift and pleasant, pt has been NPO since midnight. VSS at this time, pt waiting for procedure in the AM. No c/o pain overnight, call light within reach of pt, care plan ongoing.  Problem: Adult Inpatient Plan of Care  Goal: Plan of Care Review  Outcome: Progressing  Goal: Patient-Specific Goal (Individualized)  Outcome: Progressing  Goal: Absence of Hospital-Acquired Illness or Injury  Outcome: Progressing  Intervention: Identify and Manage Fall Risk  Recent Flowsheet Documentation  Taken 7/11/2025 0200 by Neelima White LPN  Safety Promotion/Fall Prevention:   assistive device/personal items within reach   clutter free environment maintained   nonskid shoes/slippers when out of bed   safety round/check completed  Taken 7/11/2025 0000 by Neelima White LPN  Safety Promotion/Fall Prevention:   assistive device/personal items within reach   clutter free environment maintained   nonskid shoes/slippers when out of bed   safety round/check completed  Taken 7/10/2025 2200 by Neelima White LPN  Safety Promotion/Fall Prevention:   assistive device/personal items within reach   clutter free environment maintained   nonskid shoes/slippers when out of bed   safety round/check completed  Taken 7/10/2025 2000 by Neelima White LPN  Safety Promotion/Fall Prevention:   assistive device/personal items within reach   clutter free environment maintained   nonskid shoes/slippers when out of bed   safety round/check completed  Intervention: Prevent Skin Injury  Recent Flowsheet Documentation  Taken 7/10/2025 2000 by Neelima White LPN  Skin Protection: transparent dressing maintained  Intervention: Prevent and Manage VTE (Venous Thromboembolism) Risk  Recent Flowsheet Documentation  Taken 7/10/2025 2000 by Neelima White LPN  VTE Prevention/Management: (On lovenox)   bilateral   SCDs (sequential compression devices) off   patient refused intervention   other (see comments)  Intervention: Prevent Infection  Recent  Flowsheet Documentation  Taken 7/11/2025 0200 by Neelima White LPN  Infection Prevention:   hand hygiene promoted   single patient room provided   rest/sleep promoted  Taken 7/11/2025 0000 by Neelima White LPN  Infection Prevention:   hand hygiene promoted   single patient room provided   rest/sleep promoted  Taken 7/10/2025 2200 by Neelima White LPN  Infection Prevention:   hand hygiene promoted   rest/sleep promoted   single patient room provided  Taken 7/10/2025 2000 by Neelima White LPN  Infection Prevention:   hand hygiene promoted   single patient room provided   rest/sleep promoted  Goal: Optimal Comfort and Wellbeing  Outcome: Progressing  Intervention: Provide Person-Centered Care  Recent Flowsheet Documentation  Taken 7/11/2025 0000 by Neelima White LPN  Trust Relationship/Rapport:   care explained   thoughts/feelings acknowledged   reassurance provided   questions encouraged   questions answered  Taken 7/10/2025 2000 by Neelima White LPN  Trust Relationship/Rapport:   care explained   thoughts/feelings acknowledged   reassurance provided   questions encouraged   questions answered  Goal: Readiness for Transition of Care  Outcome: Progressing   Goal Outcome Evaluation:

## 2025-07-12 ENCOUNTER — HOSPITAL ENCOUNTER (EMERGENCY)
Facility: HOSPITAL | Age: 75
Discharge: HOME | End: 2025-07-12
Attending: EMERGENCY MEDICINE
Payer: MEDICARE

## 2025-07-12 VITALS
BODY MASS INDEX: 31.8 KG/M2 | SYSTOLIC BLOOD PRESSURE: 113 MMHG | DIASTOLIC BLOOD PRESSURE: 76 MMHG | TEMPERATURE: 97.9 F | HEART RATE: 73 BPM | WEIGHT: 202.6 LBS | HEIGHT: 67 IN | OXYGEN SATURATION: 95 % | RESPIRATION RATE: 16 BRPM

## 2025-07-12 VITALS
HEIGHT: 67 IN | BODY MASS INDEX: 31.63 KG/M2 | HEART RATE: 86 BPM | DIASTOLIC BLOOD PRESSURE: 85 MMHG | SYSTOLIC BLOOD PRESSURE: 141 MMHG | OXYGEN SATURATION: 94 % | TEMPERATURE: 98.4 F | WEIGHT: 201.5 LBS | RESPIRATION RATE: 18 BRPM

## 2025-07-12 DIAGNOSIS — Z09 POSTOPERATIVE EXAMINATION: Primary | ICD-10-CM

## 2025-07-12 LAB
ALBUMIN SERPL-MCNC: 3.9 G/DL (ref 3.5–5.2)
ALBUMIN/GLOB SERPL: 1.4 G/DL
ALP SERPL-CCNC: 46 U/L (ref 39–117)
ALT SERPL W P-5'-P-CCNC: 83 U/L (ref 1–41)
ANION GAP SERPL CALCULATED.3IONS-SCNC: 25.6 MMOL/L (ref 5–15)
AST SERPL-CCNC: 69 U/L (ref 1–40)
BASOPHILS # BLD AUTO: 0.01 10*3/MM3 (ref 0–0.2)
BASOPHILS NFR BLD AUTO: 0.1 % (ref 0–1.5)
BILIRUB SERPL-MCNC: 0.9 MG/DL (ref 0–1.2)
BUN SERPL-MCNC: 11.7 MG/DL (ref 8–23)
BUN/CREAT SERPL: 14.1 (ref 7–25)
CALCIUM SPEC-SCNC: 8.8 MG/DL (ref 8.6–10.5)
CHLORIDE SERPL-SCNC: 98 MMOL/L (ref 98–107)
CO2 SERPL-SCNC: 22.4 MMOL/L (ref 22–29)
CREAT SERPL-MCNC: 0.83 MG/DL (ref 0.76–1.27)
DEPRECATED RDW RBC AUTO: 42.8 FL (ref 37–54)
EGFRCR SERPLBLD CKD-EPI 2021: 91.8 ML/MIN/1.73
EOSINOPHIL # BLD AUTO: 0 10*3/MM3 (ref 0–0.4)
EOSINOPHIL NFR BLD AUTO: 0 % (ref 0.3–6.2)
ERYTHROCYTE [DISTWIDTH] IN BLOOD BY AUTOMATED COUNT: 12.3 % (ref 12.3–15.4)
GLOBULIN UR ELPH-MCNC: 2.7 GM/DL
GLUCOSE SERPL-MCNC: 137 MG/DL (ref 65–99)
HCT VFR BLD AUTO: 41.7 % (ref 37.5–51)
HGB BLD-MCNC: 14.3 G/DL (ref 13–17.7)
IMM GRANULOCYTES # BLD AUTO: 0.03 10*3/MM3 (ref 0–0.05)
IMM GRANULOCYTES NFR BLD AUTO: 0.3 % (ref 0–0.5)
LYMPHOCYTES # BLD AUTO: 0.55 10*3/MM3 (ref 0.7–3.1)
LYMPHOCYTES NFR BLD AUTO: 6.3 % (ref 19.6–45.3)
MCH RBC QN AUTO: 32.4 PG (ref 26.6–33)
MCHC RBC AUTO-ENTMCNC: 34.3 G/DL (ref 31.5–35.7)
MCV RBC AUTO: 94.3 FL (ref 79–97)
MONOCYTES # BLD AUTO: 0.59 10*3/MM3 (ref 0.1–0.9)
MONOCYTES NFR BLD AUTO: 6.8 % (ref 5–12)
NEUTROPHILS NFR BLD AUTO: 7.56 10*3/MM3 (ref 1.7–7)
NEUTROPHILS NFR BLD AUTO: 86.5 % (ref 42.7–76)
NRBC BLD AUTO-RTO: 0 /100 WBC (ref 0–0.2)
PLATELET # BLD AUTO: 246 10*3/MM3 (ref 140–450)
PMV BLD AUTO: 9.4 FL (ref 6–12)
POTASSIUM SERPL-SCNC: 4.8 MMOL/L (ref 3.5–5.2)
PROT SERPL-MCNC: 6.6 G/DL (ref 6–8.5)
RBC # BLD AUTO: 4.42 10*6/MM3 (ref 4.14–5.8)
SODIUM SERPL-SCNC: 146 MMOL/L (ref 136–145)
WBC NRBC COR # BLD AUTO: 8.74 10*3/MM3 (ref 3.4–10.8)

## 2025-07-12 PROCEDURE — 80053 COMPREHEN METABOLIC PANEL: CPT | Performed by: STUDENT IN AN ORGANIZED HEALTH CARE EDUCATION/TRAINING PROGRAM

## 2025-07-12 PROCEDURE — 99024 POSTOP FOLLOW-UP VISIT: CPT

## 2025-07-12 PROCEDURE — 85025 COMPLETE CBC W/AUTO DIFF WBC: CPT | Performed by: STUDENT IN AN ORGANIZED HEALTH CARE EDUCATION/TRAINING PROGRAM

## 2025-07-12 PROCEDURE — 99282 EMERGENCY DEPT VISIT SF MDM: CPT

## 2025-07-12 RX ORDER — OXYCODONE HYDROCHLORIDE 5 MG/1
5 TABLET ORAL EVERY 8 HOURS PRN
Qty: 10 TABLET | Refills: 0 | Status: SHIPPED | OUTPATIENT
Start: 2025-07-12 | End: 2025-07-15

## 2025-07-12 RX ORDER — ACETAMINOPHEN 325 MG/1
650 TABLET ORAL EVERY 4 HOURS PRN
Qty: 30 TABLET | Refills: 0 | Status: SHIPPED | OUTPATIENT
Start: 2025-07-12

## 2025-07-12 RX ORDER — ONDANSETRON 4 MG/1
4 TABLET, FILM COATED ORAL DAILY PRN
Qty: 30 TABLET | Refills: 1 | Status: SHIPPED | OUTPATIENT
Start: 2025-07-12 | End: 2026-07-12

## 2025-07-12 RX ADMIN — LOSARTAN POTASSIUM 25 MG: 25 TABLET, FILM COATED ORAL at 10:22

## 2025-07-12 RX ADMIN — OXYBUTYNIN CHLORIDE 10 MG: 10 TABLET, EXTENDED RELEASE ORAL at 10:22

## 2025-07-12 RX ADMIN — OXYCODONE 10 MG: 5 TABLET ORAL at 03:43

## 2025-07-12 RX ADMIN — AMLODIPINE BESYLATE 2.5 MG: 2.5 TABLET ORAL at 10:22

## 2025-07-12 RX ADMIN — Medication 10 ML: at 10:51

## 2025-07-12 NOTE — PLAN OF CARE
Problem: Adult Inpatient Plan of Care  Goal: Plan of Care Review  Outcome: Progressing  Goal: Patient-Specific Goal (Individualized)  Outcome: Progressing  Goal: Absence of Hospital-Acquired Illness or Injury  Outcome: Progressing  Intervention: Identify and Manage Fall Risk  Recent Flowsheet Documentation  Taken 7/12/2025 0343 by Rosamaria May RN  Safety Promotion/Fall Prevention:   safety round/check completed   room organization consistent   nonskid shoes/slippers when out of bed   fall prevention program maintained   clutter free environment maintained   assistive device/personal items within reach   activity supervised  Taken 7/12/2025 0200 by Rosamaria May RN  Safety Promotion/Fall Prevention:   safety round/check completed   room organization consistent   nonskid shoes/slippers when out of bed   fall prevention program maintained   clutter free environment maintained   assistive device/personal items within reach  Taken 7/12/2025 0015 by Rosamaria May RN  Safety Promotion/Fall Prevention:   safety round/check completed   room organization consistent   nonskid shoes/slippers when out of bed   clutter free environment maintained   assistive device/personal items within reach  Taken 7/11/2025 2000 by Rosamaria May RN  Safety Promotion/Fall Prevention:   safety round/check completed   room organization consistent   nonskid shoes/slippers when out of bed   fall prevention program maintained   clutter free environment maintained   assistive device/personal items within reach   activity supervised  Intervention: Prevent Skin Injury  Recent Flowsheet Documentation  Taken 7/12/2025 0343 by Rosamaria May RN  Body Position: position changed independently  Skin Protection: transparent dressing maintained  Taken 7/12/2025 0200 by Rosamaria May RN  Body Position: position changed independently  Taken 7/12/2025 0015 by Rosamaria May RN  Body Position: position changed independently  Skin Protection:  transparent dressing maintained  Taken 7/11/2025 2000 by Rosamaria May RN  Body Position: position changed independently  Skin Protection: transparent dressing maintained  Intervention: Prevent and Manage VTE (Venous Thromboembolism) Risk  Recent Flowsheet Documentation  Taken 7/12/2025 0015 by Rosamaria May RN  VTE Prevention/Management: (pt is walking) SCDs (sequential compression devices) off  Taken 7/11/2025 2000 by Rosamaria May RN  VTE Prevention/Management: SCDs (sequential compression devices) on  Intervention: Prevent Infection  Recent Flowsheet Documentation  Taken 7/12/2025 0343 by Rosamaria May RN  Infection Prevention:   single patient room provided   rest/sleep promoted   personal protective equipment utilized   hand hygiene promoted   equipment surfaces disinfected   environmental surveillance performed  Taken 7/12/2025 0200 by Rosamaria May RN  Infection Prevention:   single patient room provided   rest/sleep promoted   personal protective equipment utilized   hand hygiene promoted   equipment surfaces disinfected  Taken 7/12/2025 0015 by Rosamaria May RN  Infection Prevention:   single patient room provided   rest/sleep promoted   personal protective equipment utilized   hand hygiene promoted   equipment surfaces disinfected   environmental surveillance performed  Taken 7/11/2025 2000 by Rosamaria May RN  Infection Prevention:   single patient room provided   rest/sleep promoted   personal protective equipment utilized   hand hygiene promoted   equipment surfaces disinfected   environmental surveillance performed  Goal: Optimal Comfort and Wellbeing  Outcome: Progressing  Intervention: Monitor Pain and Promote Comfort  Recent Flowsheet Documentation  Taken 7/11/2025 2000 by Rosamaria May RN  Pain Management Interventions: no interventions per patient request  Intervention: Provide Person-Centered Care  Recent Flowsheet Documentation  Taken 7/12/2025 0343 by Rosamaria May RN  Trust  Relationship/Rapport:   care explained   choices provided   questions answered  Taken 7/12/2025 0015 by Rosamaria May RN  Trust Relationship/Rapport:   care explained   choices provided   questions answered  Taken 7/11/2025 2000 by Rosamaria May RN  Trust Relationship/Rapport:   care explained   choices provided   questions answered  Goal: Readiness for Transition of Care  Outcome: Progressing     Problem: Fall Injury Risk  Goal: Absence of Fall and Fall-Related Injury  Outcome: Progressing  Intervention: Identify and Manage Contributors  Recent Flowsheet Documentation  Taken 7/12/2025 0343 by Rosamaria May RN  Medication Review/Management: medications reviewed  Self-Care Promotion: independence encouraged  Taken 7/12/2025 0200 by Rosamaria May RN  Medication Review/Management: medications reviewed  Taken 7/12/2025 0015 by Rosamaria May RN  Medication Review/Management: medications reviewed  Self-Care Promotion: independence encouraged  Taken 7/11/2025 2000 by Rosamaria May RN  Medication Review/Management: medications reviewed  Self-Care Promotion: independence encouraged  Intervention: Promote Injury-Free Environment  Recent Flowsheet Documentation  Taken 7/12/2025 0343 by Rosamaria May RN  Safety Promotion/Fall Prevention:   safety round/check completed   room organization consistent   nonskid shoes/slippers when out of bed   fall prevention program maintained   clutter free environment maintained   assistive device/personal items within reach   activity supervised  Taken 7/12/2025 0200 by Rosamaria May RN  Safety Promotion/Fall Prevention:   safety round/check completed   room organization consistent   nonskid shoes/slippers when out of bed   fall prevention program maintained   clutter free environment maintained   assistive device/personal items within reach  Taken 7/12/2025 0015 by Rosamaria May RN  Safety Promotion/Fall Prevention:   safety round/check completed   room organization  consistent   nonskid shoes/slippers when out of bed   clutter free environment maintained   assistive device/personal items within reach  Taken 7/11/2025 2000 by Rosamaria May RN  Safety Promotion/Fall Prevention:   safety round/check completed   room organization consistent   nonskid shoes/slippers when out of bed   fall prevention program maintained   clutter free environment maintained   assistive device/personal items within reach   activity supervised   Goal Outcome Evaluation:

## 2025-07-12 NOTE — DISCHARGE SUMMARY
"      Hospitalist Service   DISCHARGE SUMMARY    Patient Name: Giovani Moseley  : 1950  MRN: [id number]    Date of Admission: 2025  Date of Discharge:  25    Primary Care Physician: Yvonne Santa MD      Presenting Problem:   Unstable angina [I20.0]    Active and Resolved Hospital Problems:  Active Hospital Problems    Diagnosis POA    **Epigastric abdominal pain [R10.13] Unknown      Resolved Hospital Problems   No resolved problems to display.         Hospital Course     Hospital Course:  Giovani Moseley is a 75 y.o. male with a CMH of AAA without rupture, hypertension, hyperlipidemia, OAB, BPH, leg cramps who presented to [facility] on 2025 with chest pressure.  Patient reported he has had symptoms all day, describes as \" a lot of discomfort, feels like pressure up here\" pointing to epigastric region.  Patient reported for the past couple of weeks he has been having leg cramping at nighttime, so he cut back on his dose of losartan and nexlizet to half dose, and reported sometimes he skips doses.  Patient reported he vomited 3 times in the emergency department.  ACS ruled out, troponin negative.  Due to AAA history, CTA chest abdomen pelvis negative for any acute findings.  Cardiology consulted.  Stress test and echo showing stable findings.  Further evaluation by GI was ordered.  MRCP positive for cholecystitis, general surgery consulted.  Further details as noted below.  Cleared to discharge per general surgery.  Patient is medically stable to discharge home today.    Assessment/Plan:         Epigastric pain secondary to cholelithiasis with cholecystitis  -Right upper quadrant ultrasound shows gallbladder stones and sludge but no acute cholecystitis or obstruction  -MRCP consistent with acute cholecystitis and gallbladder sludge but no definite choledocholithiasis or bile duct dilation  -General Surgery on board, s/p lap katlyn and IOC in the OR, JEFERSON drain in place  -LFTs mildly " elevated but expected from surgery  -GI signed off  -Okay to discharge per general surgery if tolerating diet and follow-up outpatient for drain management     Substernal chest pain/pressure, improved  -ACS ruled out, troponin negative  -CTA chest abdomen pelvis negative for any acute findings  -Stress test negative, echo with preserved EF noted  -Continue aspirin, statin  -Cardiology following     Hypertension  Hyperlipidemia  BPH  -Continue home meds as tolerated, monitor    DISCHARGE Follow Up Recommendations for labs and diagnostics:   Follow-up with PCP and general surgery    Day of Discharge     Vital Signs:  Temp:  [97.4 °F (36.3 °C)-98.1 °F (36.7 °C)] 97.4 °F (36.3 °C)  Heart Rate:  [] 79  Resp:  [10-17] 16  BP: (118-141)/(48-85) 120/69  Flow (L/min) (Oxygen Therapy):  [3-6] 3    Physical Exam:  General: Awake, alert, NAD  Eyes: PERRL, EOMI, conjunctivae are clear  Cardiovascular: Regular rate and rhythm, no murmurs  Respiratory: Clear to auscultation bilaterally, no wheezing or rales, unlabored breathing  Abdomen: Soft, incisions intact, JEFERSON drain in place, positive bowel sounds, no guarding  Neurologic: A&O, CN grossly intact, moves all extremities spontaneously  Musculoskeletal: Normal range of motion, no other gross deformities  Skin: Warm, dry        Pertinent  and/or Most Recent Results     LAB RESULTS:      Lab 07/12/25  0353 07/11/25  0116 07/09/25  2326 07/09/25  0443 07/08/25  1413   WBC 8.74 5.47 9.90 13.63* 5.91   HEMOGLOBIN 14.3 13.5 15.1 15.0 14.7   HEMATOCRIT 41.7 40.0 43.5 43.4 42.9   PLATELETS 246 204 234 230 243   NEUTROS ABS 7.56* 2.99 6.90 11.11* 3.58   IMMATURE GRANS (ABS) 0.03 0.01 0.03 0.04 0.02   LYMPHS ABS 0.55* 1.57 1.58 1.19 1.61   MONOS ABS 0.59 0.67 1.27* 1.25* 0.58   EOS ABS 0.00 0.20 0.08 0.00 0.08   MCV 94.3 94.6 93.8 94.6 94.5   CRP  --   --  9.85*  --   --          Lab 07/12/25  0353 07/11/25  0116 07/09/25  2326 07/09/25  0443 07/08/25  1521 07/08/25  1413   SODIUM  146* 137 139 139  137  --  140   POTASSIUM 4.8 3.7 4.3 4.1  3.8  --  4.0   CHLORIDE 98 104 103 103  102  --  103   CO2 22.4 25.1 22.9 20.7*  22.9  --  26.5   ANION GAP 25.6* 7.9 13.1 15.3*  12.1  --  10.5   BUN 11.7 16.0 16.2 11.7  11.8  --  14.2   CREATININE 0.83 0.80 0.91 0.89  0.90  --  0.88   EGFR 91.8 92.9 88.4 89.9  89.6  --  90.2   GLUCOSE 137* 116* 109* 116*  118*  --  95   CALCIUM 8.8 8.7 9.2 9.4  9.3  --  9.5   MAGNESIUM  --  1.9 2.0 1.9  --   --    PHOSPHORUS  --  3.1 2.5 3.6  --   --    HEMOGLOBIN A1C  --   --   --   --   --  5.81*   TSH  --   --   --   --  1.920  --          Lab 07/12/25  0353 07/11/25  0116 07/09/25  0443   TOTAL PROTEIN 6.6 5.7* 6.4   ALBUMIN 3.9 3.6 4.4   GLOBULIN 2.7 2.1 2.0   ALT (SGPT) 83* 19 31   AST (SGOT) 69* 18 31   BILIRUBIN 0.9 1.1 1.7*   ALK PHOS 46 30* 34*   AMYLASE  --   --  302*   LIPASE  --  48 816*         Lab 07/08/25  1521 07/08/25  1413   HSTROP T 8 9         Lab 07/08/25  1521   CHOLESTEROL 134   LDL CHOL 63   HDL CHOL 47   TRIGLYCERIDES 141             Brief Urine Lab Results       None          Microbiology Results (last 10 days)       ** No results found for the last 240 hours. **            FL Cholangiogram Operative  Result Date: 7/11/2025  Impression: Impression: Intraoperative cholangiogram. Correlation with findings at time of study suggested. Electronically Signed: Kevon Bartholomew MD  7/11/2025 4:31 PM EDT  Workstation ID: [id number]    MRI Abdomen With & Without Contrast  Result Date: 7/10/2025  Impression: 1. Acute cholecystitis. Gallbladder sludge and gallstones are present. No definite choledocholithiasis or biliary dilation is appreciated. 2. Unremarkable MR appearance of the pancreas. No evidence of active pancreatic inflammation, mass, ductal dilation, or pseudocyst. 3. Low confluence of the common hepatic duct and cystic duct near the level of the pancreatic head, which should be recognized if surgical intervention is contemplated. 4.  Additional findings include: Mild posterior right basilar atelectasis, hepatic cysts, left renal cyst, descending colonic diverticulosis. Electronically Signed: Yoana Interiano MD  7/10/2025 2:32 PM EDT  Workstation ID: [id number]    US Abdomen Limited  Result Date: 7/9/2025  Impression: 1.Gallbladder is distended with stones and sludge. No gallbladder wall thickening. Negative sonographic Ames's sign. No biliary obstruction. 2.Trace free fluid adjacent to the liver. Electronically Signed: Giovani Abel MD  7/9/2025 11:35 PM EDT  Workstation ID: [id number]    CT Angiogram Chest  Result Date: 7/8/2025  Impression: Impression: CT scan of the chest with IV contrast demonstrating no findings of PE. Coronary artery calcifications. CT scan of the abdomen and pelvis demonstrating fatty liver. Enlarged prostate gland with metallic densities consistent with radiation therapy. Mild diverticulosis of the descending colon and sigmoid colon without diverticulitis. Electronically Signed: Se Bowles MD  7/8/2025 9:42 PM EDT  Workstation ID: [id number]    CT Abdomen Pelvis With Contrast  Result Date: 7/8/2025  Impression: Impression: CT scan of the chest with IV contrast demonstrating no findings of PE. Coronary artery calcifications. CT scan of the abdomen and pelvis demonstrating fatty liver. Enlarged prostate gland with metallic densities consistent with radiation therapy. Mild diverticulosis of the descending colon and sigmoid colon without diverticulitis. Electronically Signed: Se Bowles MD  7/8/2025 9:42 PM EDT  Workstation ID: [id number]    XR Chest 1 View  Result Date: 7/8/2025  Impression: Impression: No acute cardiopulmonary disease. Electronically Signed: Charbel Harvey MD  7/8/2025 2:28 PM EDT  Workstation ID: [id number]      Results for orders placed during the hospital encounter of 04/27/21    Duplex Carotid Ultrasound CAR 04/27/2021  1:01 PM    Interpretation Summary  · Right internal carotid artery is normal.  ·  Left internal carotid artery plaque without significant stenosis.      Results for orders placed during the hospital encounter of 04/27/21    Duplex Carotid Ultrasound CAR 04/27/2021  1:01 PM    Interpretation Summary  · Right internal carotid artery is normal.  · Left internal carotid artery plaque without significant stenosis.      Results for orders placed during the hospital encounter of 07/08/25    Adult Transthoracic Echo Complete w/ Color, Spectral and Contrast if necessary per protocol 07/09/2025  8:35 PM    Interpretation Summary    Left ventricular systolic function is normal. Left ventricular ejection fraction appears to be 61 - 65%.    Left ventricular diastolic function is consistent with (grade I) impaired relaxation.    There is moderate calcification of the aortic valve mainly affecting the right coronary cusp(s).    Estimated right ventricular systolic pressure from tricuspid regurgitation is normal (<35 mmHg).      Labs Pending at Discharge:  Pending Labs       Order Current Status    Tissue Pathology Exam In process            Procedures Performed  Procedure(s):  CHOLECYSTECTOMY LAPAROSCOPIC INTRAOPERATIVE CHOLANGIOGRAM WITH DAVINCI ROBOT         Consults:   Consults       Date and Time Order Name Status Description    7/10/2025  8:14 AM Inpatient General Surgery Consult Completed     7/9/2025  3:34 PM Inpatient Gastroenterology Consult Completed     7/8/2025  4:43 PM Inpatient Cardiology Consult Completed     7/8/2025  4:15 PM Hospitalist (on-call MD unless specified)                Discharge Details        Discharge Medications        New Medications        Instructions Start Date   acetaminophen 325 MG tablet  Commonly known as: Tylenol   650 mg, Oral, Every 4 Hours PRN      ondansetron 4 MG tablet  Commonly known as: Zofran   4 mg, Oral, Daily PRN      oxyCODONE 5 MG immediate release tablet  Commonly known as: Roxicodone   5 mg, Oral, Every 8 Hours PRN             Continue These Medications         Instructions Start Date   Bempedoic Acid-Ezetimibe 180-10 MG tablet   1 tablet, Oral, Daily      CoQ10 100 MG capsule   1 capsule, Daily      doxepin 10 MG capsule  Commonly known as: SINEquan   10 mg, Oral, As Needed, prn      Gemtesa 75 MG tablet  Generic drug: Vibegron   Take 1 tablet by mouth Daily.      losartan 25 MG tablet  Commonly known as: COZAAR   25 mg, Oral, Daily      silodosin 8 MG capsule capsule  Commonly known as: RAPAFLO   Take 1 capsule by mouth Daily.               No Known Allergies      Discharge Disposition:  Home or Self Care    Diet:  Hospital:  Diet Order   Procedures    Diet: Gastrointestinal; Fat-Restricted; Fluid Consistency: Thin (IDDSI 0)         Discharge Activity:         CODE STATUS:  Code Status and Medical Interventions: CPR (Attempt to Resuscitate); Full Support   Ordered at: 07/08/25 2023     Code Status (Patient has no pulse and is not breathing):    CPR (Attempt to Resuscitate)     Medical Interventions (Patient has pulse or is breathing):    Full Support     Level Of Support Discussed With:    Patient         Future Appointments   Date Time Provider Department Center   1/15/2026  9:00 AM Seipel, Joseph F, MD [facility] NEURO NA [facility]   1/20/2026  8:30 AM Yvonne Santa MD [facility] PC NGATE [facility]   2/27/2026  9:45 AM Brandon Barrios MD [facility] CAR NA [facility]           Time spent on Discharge including face to face service:  35 minutes    Signature:    Electronically signed by Salvador Pimentel DO, 07/12/25, 9:53 AM EDT.      Part of this note may be an electronic transcription/translation of spoken language to printed text using the Dragon Dictation System.

## 2025-07-12 NOTE — PROGRESS NOTES
General Surgery Progress Note    Name: Giovani Moseley ADMIT: 2025   : 1950  PCP: Yvonne Santa MD    MRN: [id number] LOS: 4 days   AGE/SEX: 75 y.o. male  ROOM:    [facility]    Chief Complaint   Patient presents with    Chest Pain     Subjective     Patient seen examined.  Vital signs stable, afebrile.  Overall doing well, does report some abdominal soreness.  Pain is controlled.  Denies nausea and vomiting.  JEFERSNO with 40 mL of output since placement.    Objective     Scheduled Medications:   amLODIPine, 2.5 mg, Oral, Q24H  cefTRIAXone, 2 g, Intravenous, Q24H  doxepin, 10 mg, Oral, Nightly  losartan, 25 mg, Oral, Q24H  metroNIDAZOLE, 500 mg, Intravenous, Q8H  oxybutynin XL, 10 mg, Oral, Daily  sodium chloride, 10 mL, Intravenous, Q12H  tamsulosin, 0.4 mg, Oral, Nightly        Active Infusions:       As Needed Medications:    acetaminophen **OR** acetaminophen **OR** acetaminophen    senna-docusate sodium **AND** polyethylene glycol **AND** bisacodyl **AND** bisacodyl    Calcium Replacement - Follow Nurse / BPA Driven Protocol    HYDROmorphone    Magnesium Standard Dose Replacement - Follow Nurse / BPA Driven Protocol    nitroglycerin    ondansetron    oxyCODONE    oxyCODONE    Phosphorus Replacement - Follow Nurse / BPA Driven Protocol    Potassium Replacement - Follow Nurse / BPA Driven Protocol    [COMPLETED] Insert Peripheral IV **AND** sodium chloride    sodium chloride    Vital Signs  Vital Signs Patient Vitals for the past 24 hrs:   BP Temp Temp src Pulse Resp SpO2   25 0730 120/69 97.4 °F (36.3 °C) Oral 79 16 --   25 0336 134/85 97.5 °F (36.4 °C) Oral 100 17 93 %   25 2343 126/80 98.1 °F (36.7 °C) Oral 107 14 91 %   25 1957 129/77 97.5 °F (36.4 °C) Oral 88 15 93 %   25 1818 133/75 97.8 °F (36.6 °C) Oral 79 13 96 %   25 1801 137/74 -- -- 74 17 99 %   25 1746 136/75 -- -- 72 12 99 %   25 1731 141/73 -- -- 73 17 100 %   25 1726 135/77 -- -- 73  16 99 %   07/11/25 1721 135/76 -- -- 73 15 100 %   07/11/25 1716 129/72 97.9 °F (36.6 °C) Oral 75 14 99 %   07/11/25 1211 138/48 -- -- 67 12 96 %   07/11/25 1100 118/82 97.7 °F (36.5 °C) Oral 56 10 95 %     I/O:  I/O last 3 completed shifts:  In: 2776 [P.O.:476; I.V.:2300]  Out: 590 [Urine:550; Drains:40]    Physical Exam:  Physical Exam  Constitutional:       General: He is not in acute distress.  Cardiovascular:      Rate and Rhythm: Normal rate.   Pulmonary:      Effort: Pulmonary effort is normal. No respiratory distress.   Abdominal:      General: There is no distension.      Palpations: Abdomen is soft.      Tenderness: There is abdominal tenderness. There is no guarding.      Comments: Incisions clean dry and intact with skin glue.  JEFERSON x 1 with serosanguineous output.   Neurological:      Mental Status: He is alert. Mental status is at baseline.   Psychiatric:         Mood and Affect: Mood normal.         Behavior: Behavior normal.         Results Review:     CBC    Results from last 7 days   Lab Units 07/12/25  0353 07/11/25  0116 07/09/25 2326 07/09/25 0443 07/08/25  1413   WBC 10*3/mm3 8.74 5.47 9.90 13.63* 5.91   HEMOGLOBIN g/dL 14.3 13.5 15.1 15.0 14.7   PLATELETS 10*3/mm3 246 204 234 230 243     BMP   Results from last 7 days   Lab Units 07/12/25  0353 07/11/25  0116 07/09/25 2326 07/09/25  0443 07/08/25  1413   SODIUM mmol/L 146* 137 139 139  137 140   POTASSIUM mmol/L 4.8 3.7 4.3 4.1  3.8 4.0   CHLORIDE mmol/L 98 104 103 103  102 103   CO2 mmol/L 22.4 25.1 22.9 20.7*  22.9 26.5   BUN mg/dL 11.7 16.0 16.2 11.7  11.8 14.2   CREATININE mg/dL 0.83 0.80 0.91 0.89  0.90 0.88   GLUCOSE mg/dL 137* 116* 109* 116*  118* 95   MAGNESIUM mg/dL  --  1.9 2.0 1.9  --    PHOSPHORUS mg/dL  --  3.1 2.5 3.6  --      Radiology(recent) FL Cholangiogram Operative  Result Date: 7/11/2025  Impression: Intraoperative cholangiogram. Correlation with findings at time of study suggested. Electronically Signed: Kevon  MD [place]  7/11/2025 4:31 PM EDT  Workstation ID: [id number]    MRI Abdomen With & Without Contrast  Result Date: 7/10/2025  1. Acute cholecystitis. Gallbladder sludge and gallstones are present. No definite choledocholithiasis or biliary dilation is appreciated. 2. Unremarkable MR appearance of the pancreas. No evidence of active pancreatic inflammation, mass, ductal dilation, or pseudocyst. 3. Low confluence of the common hepatic duct and cystic duct near the level of the pancreatic head, which should be recognized if surgical intervention is contemplated. 4. Additional findings include: Mild posterior right basilar atelectasis, hepatic cysts, left renal cyst, descending colonic diverticulosis. Electronically Signed: Yoana Interiano MD  7/10/2025 2:32 PM EDT  Workstation ID: [id number]     I reviewed the patient's new clinical results.    Assessment & Plan       Unstable angina    Epigastric abdominal pain      75 y.o. male POD 1 status post robotic cholecystectomy with IOC    -Advance diet to low-fat  -Antiemetics and pain medication as needed  -Needs to ambulate as much as possible, out of bed to chair  -Use incentive spirometer bedside 10 times hourly awake  -Overall, appears to be doing well from general surgery perspective.  Okay to discharge once medically cleared.  Will go home with drain in place and call office for drain removal once output is serous.  Follow-up in office with Dr. Delcid in 2 weeks.              This note was created using Dragon Voice Recognition software.    TAN Ha  07/12/25  08:21 EDT

## 2025-07-13 NOTE — ED PROVIDER NOTES
Subjective   History of Present Illness  Patient is a 75-year-old male who had a cholecystectomy within the past 1 week.  Complaining of some amount of blood at the JEFERSON drain site.  He denies abdominal pain fever Bham diarrhea or other complaint      Review of Systems    Past Medical History:   Diagnosis Date    AAA (abdominal aortic aneurysm) without rupture 2021    Abnormal ECG 2019    I believe past submitted records reflect such    Benign prostate hyperplasia     Cataract     Congenital heart disease 2015    S/A    Coronary artery disease 2015    S/A    Hyperlipidemia     Hypertension 01/10/2024    Dr. 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 Stephens    COLONOSCOPY      EYE SURGERY Bilateral 10/3 and 10/15    PROSTATE SURGERY  Dr. Payne ([place])    2019    TONSILLECTOMY         Family History   Problem Relation Age of Onset    Cancer Mother         Breast/ Pancreatic/    Pancreatic cancer Mother     Vision loss Mother         Mascular    Cancer Father         Prostrate/    Alzheimer's disease Father     Cancer Brother         lung cancer (alive)    Kidney disease Brother        Social History     Socioeconomic History    Marital status:    Tobacco Use    Smoking status: Former     Current packs/day: 0.00     Average packs/day: 1 pack/day for 35.0 years (35.0 ttl pk-yrs)     Types: Cigarettes     Start date: 1965     Quit date: 2000     Years since quittin.5     Passive exposure: Past    Smokeless tobacco: Never    Tobacco comments:     Stopped roughly in beginning of . light to moderate smoker throughout my history   Vaping Use    Vaping status: Never Used   Substance and Sexual Activity     Alcohol use: Yes     Alcohol/week: 1.0 standard drink of alcohol     Types: 1 Cans of beer per week     Comment: light drinker when socializing    Drug use: No    Sexual activity: Not Currently           Objective   Physical Exam  Abdomen soft nontender.  He does have a JEFERSON drain site at the right upper quadrant.  There is a small amount of dried blood at that site.  The stitch is intact and the JEFERSON drain is in proper position.  Exam is unremarkable  Procedures           ED Course                                                       Medical Decision Making  Patient is a benign physical exam.  Will be discharged.  Will follow with his MD for recheck for any problems.        Final diagnoses:   Postoperative examination       ED Disposition  ED Disposition       ED Disposition   Discharge    Condition   Stable    Comment   --               No follow-up provider specified.       Medication List      No changes were made to your prescriptions during this visit.            Yvan Triana MD  07/12/25 2010

## 2025-07-14 ENCOUNTER — TELEPHONE (OUTPATIENT)
Dept: SURGERY | Facility: CLINIC | Age: 75
End: 2025-07-14
Payer: MEDICARE

## 2025-07-14 ENCOUNTER — READMISSION MANAGEMENT (OUTPATIENT)
Dept: CALL CENTER | Facility: HOSPITAL | Age: 75
End: 2025-07-14
Payer: MEDICARE

## 2025-07-14 ENCOUNTER — TRANSITIONAL CARE MANAGEMENT TELEPHONE ENCOUNTER (OUTPATIENT)
Dept: CALL CENTER | Facility: HOSPITAL | Age: 75
End: 2025-07-14
Payer: MEDICARE

## 2025-07-14 NOTE — CASE MANAGEMENT/SOCIAL WORK
Case Management Discharge Note      Final Note: Routine home         Selected Continued Care - Discharged on 7/12/2025 Admission date: 7/8/2025 - Discharge disposition: Home or Self Care         Transportation Services  Transportation: Private Transportation  Private: Car    Final Discharge Disposition Code: 01 - home or self-care

## 2025-07-14 NOTE — TELEPHONE ENCOUNTER
PO [facility]LEY Call- spoke with pt. Shawanda po appt. Will riley then. Knows to call with any questions or concerns.      States doing well.

## 2025-07-14 NOTE — OUTREACH NOTE
Call Center TCM Note      Flowsheet Row Responses   [facility] patient discharged from? [facility]   Does the patient have one of the following disease processes/diagnoses(primary or secondary)? General Surgery   TCM attempt successful? Yes   Call start time 1524   Call end time 1537   Discharge diagnosis Epigastric abdominal pain, Lap katlyn   Person spoke with today (if not patient) and relationship pt   Meds reviewed with patient/caregiver? Yes   Is the patient having any side effects they believe may be caused by any medication additions or changes? No   Does the patient have all medications related to this admission filled (includes all antibiotics, pain medications, etc.) Yes   Is the patient taking all medications as directed (includes completed medication regime)? Yes   Comments Post-op 7/18/2025 9:00 AM   Does the patient have an appointment with their PCP within 7-14 days of discharge? No   Nursing Interventions Patient desires to follow up with specialty only   Psychosocial issues? No   Did the patient receive a copy of their discharge instructions? Yes   Nursing interventions Reviewed instructions with patient   What is the patient's perception of their health status since discharge? Improving   Nursing interventions Nurse provided patient education   Is the patient /caregiver able to teach back basic post-op care? Take showers only when approved by MD-sponge bathe until then, No tub bath, swimming, or hot tub until instructed by MD, Keep incision areas clean,dry and protected, Lifting as instructed by MD in discharge instructions   Is the patient/caregiver able to teach back signs and symptoms of incisional infection? Increased redness, swelling or pain at the incisonal site, Increased drainage or bleeding, Incisional warmth, Pus or odor from incision, Fever   Is the patient/caregiver able to teach back steps to recovery at home? Rest and rebuild strength, gradually increase activity, Eat a well-balance  diet   If the patient is a current smoker, are they able to teach back resources for cessation? Not a smoker   Is the patient/caregiver able to teach back the hierarchy of who to call/visit for symptoms/problems? PCP, Specialist, Home health nurse, Urgent Care, ED, 911 Yes   TCM call completed? Yes   Wrap up additional comments Pt denies any fever/chills, or increased redness, swelling, excessive drainage at wound vac. Reviewed AVS/meds/instructions with pt. Pt verified Post-op appt   Call end time 1537   Would this patient benefit from a Referral to [facility] Social Work? No   Is the patient interested in additional calls from an ambulatory ? No            Cierra RAYMUNDO - Registered Nurse    7/14/2025, 15:38 EDT

## 2025-07-15 LAB
LAB AP CASE REPORT: NORMAL
PATH REPORT.FINAL DX SPEC: NORMAL
PATH REPORT.GROSS SPEC: NORMAL

## 2025-07-18 ENCOUNTER — OFFICE VISIT (OUTPATIENT)
Dept: SURGERY | Facility: CLINIC | Age: 75
End: 2025-07-18
Payer: MEDICARE

## 2025-07-18 VITALS
RESPIRATION RATE: 16 BRPM | BODY MASS INDEX: 31.44 KG/M2 | DIASTOLIC BLOOD PRESSURE: 78 MMHG | WEIGHT: 200.3 LBS | HEART RATE: 52 BPM | SYSTOLIC BLOOD PRESSURE: 130 MMHG | TEMPERATURE: 98.4 F | HEIGHT: 67 IN | OXYGEN SATURATION: 96 %

## 2025-07-18 DIAGNOSIS — K81.0 GANGRENOUS CHOLECYSTITIS: ICD-10-CM

## 2025-07-18 DIAGNOSIS — Z90.49 S/P LAPAROSCOPIC CHOLECYSTECTOMY: Primary | ICD-10-CM

## 2025-07-18 PROCEDURE — 99024 POSTOP FOLLOW-UP VISIT: CPT | Performed by: STUDENT IN AN ORGANIZED HEALTH CARE EDUCATION/TRAINING PROGRAM

## 2025-07-18 NOTE — PROGRESS NOTES
General Surgery Post-Operative Clinic Note    Subjective:  Giovani Moseley is a 75 y.o. male who presents today for post-op visit. S/p robotic cholecystectomy with IOC performed on 7/11/2025 for gangrenous cholecystitis and hyperbilirubinemia. Required ligation of Duct of Luschka for which drain was placed. Discharged home on POD#1.     Doing well today, no complaints. Denies any bilious output from pain. Denies abdominal pain, N/V, diarrhea, jaundice, or dark urine.     Past Surgical History:   Procedure Laterality Date    CARPAL TUNNEL RELEASE Right 2024    Dr. Stephens    CHOLECYSTECTOMY WITH INTRAOPERATIVE CHOLANGIOGRAM N/A 7/11/2025    Procedure: CHOLECYSTECTOMY LAPAROSCOPIC INTRAOPERATIVE CHOLANGIOGRAM WITH [employer] ROBOT;  Surgeon: Anton Delcid MD;  Location: [facility];  Service: Robotics - DaVinci;  Laterality: N/A;    COLONOSCOPY      EYE SURGERY Bilateral 10/3 and 10/15    PROSTATE SURGERY  Dr. Payne ([place])    01-    TONSILLECTOMY         Patient Active Problem List   Diagnosis    Hyperlipidemia    Benign localized hyperplasia of prostate    Obstructive sleep apnea syndrome    Metabolic syndrome    AAA (abdominal aortic aneurysm) without rupture    Medicare annual wellness visit, subsequent    Screening for prostate cancer    Weakness of right hand    DDD (degenerative disc disease), cervical    Primary insomnia    Muscle cramp    Former smoker    Hyperglycemia    Primary hypertension    Unstable angina    Epigastric abdominal pain     Past Medical History:   Diagnosis Date    AAA (abdominal aortic aneurysm) without rupture 03/31/2021    Abnormal ECG 01/18/2019    I believe past submitted records reflect such    Benign prostate hyperplasia     Cataract     Cholelithiasis 07/08/2025    Surgery to remove - 07/11/2025    Congenital heart 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    S/A    Coronary artery 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    S/A    Hyperlipidemia     Hypertension 01/10/2024    Dr. 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 Stephens     Outpatient Encounter Medications as of 7/18/2025   Medication Sig Dispense Refill    acetaminophen (Tylenol) 325 MG tablet Take 2 tablets by mouth Every 4 (Four) Hours As Needed for Moderate Pain for up to 15 doses. 30 tablet 0    Bempedoic Acid-Ezetimibe 180-10 MG tablet Take 1 tablet by mouth Daily. 90 tablet 3    Coenzyme Q10 (CoQ10) 100 MG capsule Take 1 capsule by mouth Daily.      doxepin (SINEquan) 10 MG capsule Take 1 capsule by mouth As Needed for Sleep. prn 90 capsule 3    losartan (COZAAR) 25 MG tablet Take 1 tablet by mouth Daily. 90 tablet 3    ondansetron (Zofran) 4 MG tablet Take 1 tablet by mouth Daily As Needed for Nausea or Vomiting. 30 tablet 1    silodosin (RAPAFLO) 8 MG capsule capsule Take 1 capsule by mouth Daily.      Vibegron (Gemtesa) 75 MG tablet Take 1 tablet by mouth Daily.       No facility-administered encounter medications on file as of 7/18/2025.     No Known Allergies    Objective:  Vitals:    07/18/25 0849   BP: 130/78   BP Location: Right arm   Patient Position: Sitting   Cuff Size: Adult   Pulse: 52   Resp: 16   Temp: 98.4 °F (36.9 °C)   TempSrc: Infrared   SpO2: 96%   Weight: 90.9 kg (200 lb 4.8 oz)   Height: 170.2 cm (67\")     Body mass index is 31.37 kg/m².   Physical Exam   General: NAD, AAOx3  HEENT: no scleral icterus   Respiratory: no respiratory distress  Abdomen: soft, NT, ND, dull to percussion; drain - s/s (removed); incisions - CDI, lakesha-incisional ecchymosis in later stages of healing, palpable LUQ incisional hematoma - no pulsation, no necrosis, no palpable fascial defect   MSK: no jaundice "     Pathology (7/11/25):  Gallbladder, cholecystectomy:    Acute on chronic cholecystitis with increased eosinophils and focal necrosis    Transmural congestion    Cholelithiasis    No evidence of malignancy    Assessment:  Diagnoses and all orders for this visit:    1. S/P laparoscopic cholecystectomy (Primary)    2. Gangrenous cholecystitis       75 y.o. male, h/o recent admission for gangrenous cholecystitis and hyperbilirubinemia, s/p robotic katlyn + IOC. Required ligation of Duct of Luschka. Doing well, no issues since surgery. Drain has been s/s - no evidence of bile leak or injury to major bile duct.     Plan:  RTC PRN    Anton Delcid MD  General Surgery  07/18/25   09:06 EDT    Much of this encounter note is an electronic transcription/translation of spoken language to printed text.  The electronic translation of spoken language may permit erroneous, or at times, nonsensical words or phrases to be inadvertently transcribed.  Although I have reviewed the note for such errors, some may still exist.

## (undated) DEVICE — DRSNG WND BORDR/ADHS NONADHR/GZ LF 4X4IN STRL

## (undated) DEVICE — BLADELESS OBTURATOR: Brand: WECK VISTA

## (undated) DEVICE — SUT PROLN 4/0 BB D/A 36IN 8581H

## (undated) DEVICE — SYR LUERLOK 30CC

## (undated) DEVICE — SEAL

## (undated) DEVICE — SOL IRR H2O BO 1000ML STRL

## (undated) DEVICE — KT SURG TURNOVER 050

## (undated) DEVICE — COLUMN DRAPE

## (undated) DEVICE — THE STERILE CAMERA HANDLE COVER IS FOR USE WITH THE STERIS SURGICAL LIGHTING AND VISUALIZATION SYSTEMS.

## (undated) DEVICE — BG RETRV TISS SUPERBAG INTRO RIP/STOP NLY 10MM 240ML MD

## (undated) DEVICE — REDUCER: Brand: ENDOWRIST

## (undated) DEVICE — SUT ETHLN 2/0 PS 18IN 585H

## (undated) DEVICE — SUT VIC 0 UR6 27IN VCP603H

## (undated) DEVICE — THE STERILE LIGHT HANDLE COVER IS USED WITH STERIS SURGICAL LIGHTING AND VISUALIZATION SYSTEMS.

## (undated) DEVICE — ARM DRAPE

## (undated) DEVICE — SUCTION IRRIGATOR: Brand: ENDOWRIST

## (undated) DEVICE — PASS SUT PRO BARIATRIC XL W/TROC SWABS

## (undated) DEVICE — SUT MNCRYL PLS ANTIB UD 4/0 PS2 18IN

## (undated) DEVICE — LAPAROVUE VISIBILITY SYSTEM LAPAROSCOPIC SOLUTIONS: Brand: LAPAROVUE

## (undated) DEVICE — CFF SCD HEMFRCE SEQ CLF STD XL

## (undated) DEVICE — TROC BLADLES AIRSEAL/OPTI THRD 8X120MM 1P/U

## (undated) DEVICE — GENERAL LAPAROSCOPY CDS: Brand: MEDLINE INDUSTRIES, INC.

## (undated) DEVICE — 40580 - THE PINK PAD - ADVANCED TRENDELENBURG POSITIONING KIT: Brand: 40580 - THE PINK PAD - ADVANCED TRENDELENBURG POSITIONING KIT

## (undated) DEVICE — BLANKT WARM UPPR/BDY ARM/OUT 57X196CM

## (undated) DEVICE — ANESTH CIRCUIT 60IN 3LTR-LF: Brand: MEDLINE INDUSTRIES, INC.

## (undated) DEVICE — ANTIBACTERIAL UNDYED BRAIDED (POLYGLACTIN 910), SYNTHETIC ABSORBABLE SUTURE: Brand: COATED VICRYL

## (undated) DEVICE — ENDOPATH XCEL BLADELESS TROCARS WITH STABILITY SLEEVES: Brand: ENDOPATH XCEL

## (undated) DEVICE — GLOVE,SURG,SENSICARE SLT,LF,PF,7.5: Brand: MEDLINE

## (undated) DEVICE — SOL IRR NACL 0.9PCT BO 1000ML

## (undated) DEVICE — SUT MNCRYL 4/0 PS2 27IN UD MCP426H

## (undated) DEVICE — 3M™ STERI-STRIP™ REINFORCED ADHESIVE SKIN CLOSURES, R1547, 1/2 IN X 4 IN (12 MM X 100 MM), 6 STRIPS/ENVELOPE: Brand: 3M™ STERI-STRIP™

## (undated) DEVICE — RESERVOIR,SUCTION,100CC,SILICONE: Brand: MEDLINE

## (undated) DEVICE — ST TBG AIRSEAL BIF FLTR W/ACT/CHARCOAL/FLTR

## (undated) DEVICE — 3M™ IOBAN™ 2 ANTIMICROBIAL INCISE DRAPE 6650EZ: Brand: IOBAN™ 2